# Patient Record
Sex: MALE | Race: WHITE | NOT HISPANIC OR LATINO | Employment: OTHER | URBAN - METROPOLITAN AREA
[De-identification: names, ages, dates, MRNs, and addresses within clinical notes are randomized per-mention and may not be internally consistent; named-entity substitution may affect disease eponyms.]

---

## 2018-04-18 ENCOUNTER — OFFICE VISIT CONVERTED (OUTPATIENT)
Dept: CARDIOLOGY | Facility: CLINIC | Age: 58
End: 2018-04-18
Attending: INTERNAL MEDICINE

## 2019-01-03 ENCOUNTER — HOSPITAL ENCOUNTER (OUTPATIENT)
Dept: URGENT CARE | Facility: CLINIC | Age: 59
Discharge: HOME | End: 2019-01-03
Attending: FAMILY MEDICINE

## 2019-01-11 ENCOUNTER — OFFICE VISIT CONVERTED (OUTPATIENT)
Dept: ORTHOPEDIC SURGERY | Facility: CLINIC | Age: 59
End: 2019-01-11
Attending: ORTHOPAEDIC SURGERY

## 2019-01-31 ENCOUNTER — CONVERSION ENCOUNTER (OUTPATIENT)
Dept: GASTROENTEROLOGY | Facility: CLINIC | Age: 59
End: 2019-01-31

## 2019-01-31 ENCOUNTER — OFFICE VISIT CONVERTED (OUTPATIENT)
Dept: GASTROENTEROLOGY | Facility: CLINIC | Age: 59
End: 2019-01-31
Attending: NURSE PRACTITIONER

## 2019-04-28 ENCOUNTER — HOSPITAL ENCOUNTER (OUTPATIENT)
Dept: URGENT CARE | Facility: CLINIC | Age: 59
Discharge: HOME | End: 2019-04-28

## 2019-04-28 LAB
ALBUMIN SERPL-MCNC: 3.8 G/DL (ref 3.5–5)
ALBUMIN/GLOB SERPL: 1.2 {RATIO} (ref 1.4–2.6)
ALP SERPL-CCNC: 66 U/L (ref 56–119)
ALT SERPL-CCNC: 19 U/L (ref 10–40)
AMYLASE SERPL-CCNC: 54 U/L (ref 30–110)
ANION GAP SERPL CALC-SCNC: 14 MMOL/L (ref 8–19)
AST SERPL-CCNC: 17 U/L (ref 15–50)
BILIRUB SERPL-MCNC: 0.87 MG/DL (ref 0.2–1.3)
BUN SERPL-MCNC: 9 MG/DL (ref 5–25)
BUN/CREAT SERPL: 7 {RATIO} (ref 6–20)
CALCIUM SERPL-MCNC: 9.3 MG/DL (ref 8.7–10.4)
CHLORIDE SERPL-SCNC: 100 MMOL/L (ref 99–111)
CONV CO2: 29 MMOL/L (ref 22–32)
CONV TOTAL PROTEIN: 7.1 G/DL (ref 6.3–8.2)
CREAT UR-MCNC: 1.3 MG/DL (ref 0.7–1.2)
GFR SERPLBLD BASED ON 1.73 SQ M-ARVRAT: 60 ML/MIN/{1.73_M2}
GLOBULIN UR ELPH-MCNC: 3.3 G/DL (ref 2–3.5)
GLUCOSE SERPL-MCNC: 98 MG/DL (ref 70–99)
LIPASE SERPL-CCNC: 22 U/L (ref 5–51)
OSMOLALITY SERPL CALC.SUM OF ELEC: 287 MOSM/KG (ref 273–304)
POTASSIUM SERPL-SCNC: 4.4 MMOL/L (ref 3.5–5.3)
SODIUM SERPL-SCNC: 139 MMOL/L (ref 135–147)

## 2019-04-29 LAB
ALBUMIN SERPL-MCNC: 3.8 G/DL (ref 3.5–5)
ALBUMIN/GLOB SERPL: 1.1 {RATIO} (ref 1.4–2.6)
ALP SERPL-CCNC: 151 U/L (ref 56–119)
ALT SERPL-CCNC: 154 U/L (ref 10–40)
ANION GAP SERPL CALC-SCNC: 14 MMOL/L (ref 8–19)
APPEARANCE UR: CLEAR
AST SERPL-CCNC: 220 U/L (ref 15–50)
BASOPHILS # BLD AUTO: 0.04 10*3/UL (ref 0–0.2)
BASOPHILS NFR BLD AUTO: 0.3 % (ref 0–3)
BILIRUB SERPL-MCNC: 2.86 MG/DL (ref 0.2–1.3)
BILIRUB UR QL: ABNORMAL
BUN SERPL-MCNC: 13 MG/DL (ref 5–25)
BUN/CREAT SERPL: 10 {RATIO} (ref 6–20)
CALCIUM SERPL-MCNC: 9.3 MG/DL (ref 8.7–10.4)
CHLORIDE SERPL-SCNC: 102 MMOL/L (ref 99–111)
COLOR UR: ABNORMAL
CONV ABS IMM GRAN: 0.04 10*3/UL (ref 0–0.2)
CONV BACTERIA: NEGATIVE
CONV CO2: 26 MMOL/L (ref 22–32)
CONV COLLECTION SOURCE (UA): ABNORMAL
CONV IMMATURE GRAN: 0.3 % (ref 0–1.8)
CONV TOTAL PROTEIN: 7.2 G/DL (ref 6.3–8.2)
CONV UROBILINOGEN IN URINE BY AUTOMATED TEST STRIP: >=8 {EHRLICHU}/DL (ref 0.1–1)
CREAT UR-MCNC: 1.25 MG/DL (ref 0.7–1.2)
DEPRECATED RDW RBC AUTO: 41.1 FL (ref 35.1–43.9)
EOSINOPHIL # BLD AUTO: 0.04 10*3/UL (ref 0–0.7)
EOSINOPHIL # BLD AUTO: 0.3 % (ref 0–7)
ERYTHROCYTE [DISTWIDTH] IN BLOOD BY AUTOMATED COUNT: 11.9 % (ref 11.6–14.4)
GFR SERPLBLD BASED ON 1.73 SQ M-ARVRAT: >60 ML/MIN/{1.73_M2}
GLOBULIN UR ELPH-MCNC: 3.4 G/DL (ref 2–3.5)
GLUCOSE SERPL-MCNC: 126 MG/DL (ref 70–99)
GLUCOSE UR QL: NEGATIVE MG/DL
HBA1C MFR BLD: 16.6 G/DL (ref 14–18)
HCT VFR BLD AUTO: 48.9 % (ref 42–52)
HGB UR QL STRIP: NEGATIVE
KETONES UR QL STRIP: ABNORMAL MG/DL
LEUKOCYTE ESTERASE UR QL STRIP: ABNORMAL
LIPASE SERPL-CCNC: 32 U/L (ref 5–51)
LYMPHOCYTES # BLD AUTO: 0.64 10*3/UL (ref 1–5)
MCH RBC QN AUTO: 32 PG (ref 27–31)
MCHC RBC AUTO-ENTMCNC: 33.9 G/DL (ref 33–37)
MCV RBC AUTO: 94.2 FL (ref 80–96)
MONOCYTES # BLD AUTO: 1.05 10*3/UL (ref 0.2–1.2)
MONOCYTES NFR BLD AUTO: 7.6 % (ref 3–10)
NEUTROPHILS # BLD AUTO: 12.04 10*3/UL (ref 2–8)
NEUTROPHILS NFR BLD AUTO: 86.9 % (ref 30–85)
NITRITE UR QL STRIP: NEGATIVE
NRBC CBCN: 0 % (ref 0–0.7)
OSMOLALITY SERPL CALC.SUM OF ELEC: 288 MOSM/KG (ref 273–304)
PH UR STRIP.AUTO: 6 [PH] (ref 5–8)
PLATELET # BLD AUTO: 176 10*3/UL (ref 130–400)
PMV BLD AUTO: 11.7 FL (ref 9.4–12.4)
POTASSIUM SERPL-SCNC: 3.8 MMOL/L (ref 3.5–5.3)
PROT UR QL: 30 MG/DL
RBC # BLD AUTO: 5.19 10*6/UL (ref 4.7–6.1)
RBC #/AREA URNS HPF: ABNORMAL /[HPF]
SODIUM SERPL-SCNC: 138 MMOL/L (ref 135–147)
SP GR UR: 1.02 (ref 1–1.03)
VARIANT LYMPHS NFR BLD MANUAL: 4.6 % (ref 20–45)
WBC # BLD AUTO: 13.85 10*3/UL (ref 4.8–10.8)
WBC #/AREA URNS HPF: ABNORMAL /[HPF]

## 2019-04-30 ENCOUNTER — HOSPITAL ENCOUNTER (OUTPATIENT)
Dept: PERIOP | Facility: HOSPITAL | Age: 59
Setting detail: HOSPITAL OUTPATIENT SURGERY
Discharge: HOME | End: 2019-04-30
Attending: SURGERY

## 2019-04-30 LAB — BACTERIA UR CULT: NORMAL

## 2019-05-02 ENCOUNTER — HOSPITAL ENCOUNTER (OUTPATIENT)
Dept: SURGERY | Facility: CLINIC | Age: 59
Discharge: HOME | End: 2019-05-02
Attending: PHYSICIAN ASSISTANT

## 2019-05-02 ENCOUNTER — OFFICE VISIT CONVERTED (OUTPATIENT)
Dept: SURGERY | Facility: CLINIC | Age: 59
End: 2019-05-02
Attending: PHYSICIAN ASSISTANT

## 2019-05-04 LAB — BACTERIA UR CULT: NORMAL

## 2019-05-14 ENCOUNTER — OFFICE VISIT CONVERTED (OUTPATIENT)
Dept: SURGERY | Facility: CLINIC | Age: 59
End: 2019-05-14
Attending: SURGERY

## 2019-05-14 ENCOUNTER — CONVERSION ENCOUNTER (OUTPATIENT)
Dept: SURGERY | Facility: CLINIC | Age: 59
End: 2019-05-14

## 2019-05-14 ENCOUNTER — HOSPITAL ENCOUNTER (OUTPATIENT)
Dept: LAB | Facility: HOSPITAL | Age: 59
Discharge: HOME | End: 2019-05-14
Attending: SURGERY

## 2019-05-14 LAB
BASOPHILS # BLD AUTO: 0.09 10*3/UL (ref 0–0.2)
BASOPHILS NFR BLD AUTO: 0.5 % (ref 0–3)
CONV ABS IMM GRAN: 0.29 10*3/UL (ref 0–0.2)
CONV IMMATURE GRAN: 1.5 % (ref 0–1.8)
DEPRECATED RDW RBC AUTO: 48.5 FL (ref 35.1–43.9)
EOSINOPHIL # BLD AUTO: 0.13 10*3/UL (ref 0–0.7)
EOSINOPHIL # BLD AUTO: 0.7 % (ref 0–7)
ERYTHROCYTE [DISTWIDTH] IN BLOOD BY AUTOMATED COUNT: 13 % (ref 11.6–14.4)
HBA1C MFR BLD: 14.2 G/DL (ref 14–18)
HCT VFR BLD AUTO: 44.5 % (ref 42–52)
LYMPHOCYTES # BLD AUTO: 1.04 10*3/UL (ref 1–5)
MCH RBC QN AUTO: 32.2 PG (ref 27–31)
MCHC RBC AUTO-ENTMCNC: 31.9 G/DL (ref 33–37)
MCV RBC AUTO: 100.9 FL (ref 80–96)
MONOCYTES # BLD AUTO: 1.77 10*3/UL (ref 0.2–1.2)
MONOCYTES NFR BLD AUTO: 9.2 % (ref 3–10)
NEUTROPHILS # BLD AUTO: 15.87 10*3/UL (ref 2–8)
NEUTROPHILS NFR BLD AUTO: 82.7 % (ref 30–85)
NRBC CBCN: 0 % (ref 0–0.7)
PLATELET # BLD AUTO: 681 10*3/UL (ref 130–400)
PMV BLD AUTO: 10 FL (ref 9.4–12.4)
RBC # BLD AUTO: 4.41 10*6/UL (ref 4.7–6.1)
VARIANT LYMPHS NFR BLD MANUAL: 5.4 % (ref 20–45)
WBC # BLD AUTO: 19.19 10*3/UL (ref 4.8–10.8)

## 2019-05-17 ENCOUNTER — HOSPITAL ENCOUNTER (OUTPATIENT)
Dept: CT IMAGING | Facility: HOSPITAL | Age: 59
Discharge: HOME | End: 2019-05-17
Attending: SURGERY

## 2019-05-19 LAB — CONV ABSCESS CULTURE: NORMAL

## 2019-05-20 ENCOUNTER — CONVERSION ENCOUNTER (OUTPATIENT)
Dept: SURGERY | Facility: CLINIC | Age: 59
End: 2019-05-20

## 2019-05-20 ENCOUNTER — OFFICE VISIT CONVERTED (OUTPATIENT)
Dept: SURGERY | Facility: CLINIC | Age: 59
End: 2019-05-20
Attending: SURGERY

## 2019-05-21 ENCOUNTER — HOSPITAL ENCOUNTER (OUTPATIENT)
Dept: LAB | Facility: HOSPITAL | Age: 59
Discharge: HOME | End: 2019-05-21
Attending: SURGERY

## 2019-05-21 LAB
BASOPHILS # BLD AUTO: 0.08 10*3/UL (ref 0–0.2)
BASOPHILS NFR BLD AUTO: 0.6 % (ref 0–3)
CONV ABS IMM GRAN: 0.16 10*3/UL (ref 0–0.2)
CONV IMMATURE GRAN: 1.3 % (ref 0–1.8)
DEPRECATED RDW RBC AUTO: 44.6 FL (ref 35.1–43.9)
EOSINOPHIL # BLD AUTO: 0.37 10*3/UL (ref 0–0.7)
EOSINOPHIL # BLD AUTO: 3 % (ref 0–7)
ERYTHROCYTE [DISTWIDTH] IN BLOOD BY AUTOMATED COUNT: 12.4 % (ref 11.6–14.4)
HBA1C MFR BLD: 13.5 G/DL (ref 14–18)
HCT VFR BLD AUTO: 41.9 % (ref 42–52)
LYMPHOCYTES # BLD AUTO: 1.45 10*3/UL (ref 1–5)
MCH RBC QN AUTO: 31.4 PG (ref 27–31)
MCHC RBC AUTO-ENTMCNC: 32.2 G/DL (ref 33–37)
MCV RBC AUTO: 97.4 FL (ref 80–96)
MONOCYTES # BLD AUTO: 0.61 10*3/UL (ref 0.2–1.2)
MONOCYTES NFR BLD AUTO: 4.9 % (ref 3–10)
NEUTROPHILS # BLD AUTO: 9.81 10*3/UL (ref 2–8)
NEUTROPHILS NFR BLD AUTO: 78.6 % (ref 30–85)
NRBC CBCN: 0 % (ref 0–0.7)
PLATELET # BLD AUTO: 399 10*3/UL (ref 130–400)
PMV BLD AUTO: 10.2 FL (ref 9.4–12.4)
RBC # BLD AUTO: 4.3 10*6/UL (ref 4.7–6.1)
VARIANT LYMPHS NFR BLD MANUAL: 11.6 % (ref 20–45)
WBC # BLD AUTO: 12.48 10*3/UL (ref 4.8–10.8)

## 2019-05-28 ENCOUNTER — CONVERSION ENCOUNTER (OUTPATIENT)
Dept: SURGERY | Facility: CLINIC | Age: 59
End: 2019-05-28

## 2019-05-28 ENCOUNTER — OFFICE VISIT CONVERTED (OUTPATIENT)
Dept: SURGERY | Facility: CLINIC | Age: 59
End: 2019-05-28
Attending: SURGERY

## 2019-05-31 ENCOUNTER — HOSPITAL ENCOUNTER (OUTPATIENT)
Dept: INFUSION THERAPY | Facility: HOSPITAL | Age: 59
Discharge: HOME | End: 2019-05-31
Attending: INTERNAL MEDICINE

## 2019-05-31 ENCOUNTER — OFFICE VISIT CONVERTED (OUTPATIENT)
Dept: PULMONOLOGY | Facility: CLINIC | Age: 59
End: 2019-05-31
Attending: INTERNAL MEDICINE

## 2019-05-31 LAB
AMYLASE FLD-CCNC: 44 U/L (ref 30–150)
APPEARANCE FLD: ABNORMAL
COLOR AMN: ABNORMAL
CRYSTALS FLD MICRO: ABNORMAL
GLUCOSE FLD-MCNC: 107 MG/DL
LDH FLD-CCNC: 216 U/L
LYMPHOCYTES NFR FLD MANUAL: 84 %
MACROPHAGE FLUID: 12 /100{WBCS}
MONOCYTES NFR FLD: 6 %
NEUTROPHILS NFR FLD MANUAL: 10 %
PH FLD: 7.5 [PH]
PROT FLD-MCNC: 5.2 G/DL (ref 6.3–8.2)
RBC # FLD AUTO: ABNORMAL /UL
SPECIMEN SOURCE: ABNORMAL
WBC # FLD AUTO: 1750 /UL

## 2019-06-03 LAB — BACTERIA FLD CULT: NORMAL

## 2019-06-13 ENCOUNTER — HOSPITAL ENCOUNTER (OUTPATIENT)
Dept: GENERAL RADIOLOGY | Facility: HOSPITAL | Age: 59
Discharge: HOME | End: 2019-06-13
Attending: INTERNAL MEDICINE

## 2019-06-21 ENCOUNTER — HOSPITAL ENCOUNTER (OUTPATIENT)
Dept: GASTROENTEROLOGY | Facility: HOSPITAL | Age: 59
Setting detail: HOSPITAL OUTPATIENT SURGERY
Discharge: HOME | End: 2019-06-21
Attending: INTERNAL MEDICINE

## 2019-08-02 ENCOUNTER — HOSPITAL ENCOUNTER (OUTPATIENT)
Dept: OTHER | Facility: HOSPITAL | Age: 59
Discharge: HOME | End: 2019-08-02
Attending: INTERNAL MEDICINE

## 2019-08-02 ENCOUNTER — HOSPITAL ENCOUNTER (OUTPATIENT)
Dept: OTHER | Facility: HOSPITAL | Age: 59
Discharge: HOME | End: 2019-08-02

## 2019-08-02 LAB
ALBUMIN SERPL-MCNC: 4 G/DL (ref 3.5–5)
ALBUMIN/GLOB SERPL: 1.1 {RATIO} (ref 1.4–2.6)
ALP SERPL-CCNC: 68 U/L (ref 56–119)
ALT SERPL-CCNC: 33 U/L (ref 10–40)
ANION GAP SERPL CALC-SCNC: 13 MMOL/L (ref 8–19)
AST SERPL-CCNC: 22 U/L (ref 15–50)
BASOPHILS # BLD AUTO: 0.06 10*3/UL (ref 0–0.2)
BASOPHILS NFR BLD AUTO: 0.8 % (ref 0–3)
BILIRUB SERPL-MCNC: 0.64 MG/DL (ref 0.2–1.3)
BUN SERPL-MCNC: 18 MG/DL (ref 5–25)
BUN/CREAT SERPL: 15 {RATIO} (ref 6–20)
CALCIUM SERPL-MCNC: 9.4 MG/DL (ref 8.7–10.4)
CHLORIDE SERPL-SCNC: 106 MMOL/L (ref 99–111)
CHOLEST SERPL-MCNC: 125 MG/DL (ref 107–200)
CHOLEST/HDLC SERPL: 3.9 {RATIO} (ref 3–6)
CONV ABS IMM GRAN: 0.04 10*3/UL (ref 0–0.2)
CONV CO2: 26 MMOL/L (ref 22–32)
CONV IMMATURE GRAN: 0.5 % (ref 0–1.8)
CONV TOTAL PROTEIN: 7.6 G/DL (ref 6.3–8.2)
CREAT UR-MCNC: 1.2 MG/DL (ref 0.7–1.2)
DEPRECATED RDW RBC AUTO: 44.6 FL (ref 35.1–43.9)
EOSINOPHIL # BLD AUTO: 0.45 10*3/UL (ref 0–0.7)
EOSINOPHIL # BLD AUTO: 6 % (ref 0–7)
ERYTHROCYTE [DISTWIDTH] IN BLOOD BY AUTOMATED COUNT: 13.2 % (ref 11.6–14.4)
GFR SERPLBLD BASED ON 1.73 SQ M-ARVRAT: >60 ML/MIN/{1.73_M2}
GLOBULIN UR ELPH-MCNC: 3.6 G/DL (ref 2–3.5)
GLUCOSE SERPL-MCNC: 98 MG/DL (ref 70–99)
HBA1C MFR BLD: 16.5 G/DL (ref 14–18)
HCT VFR BLD AUTO: 48.4 % (ref 42–52)
HDLC SERPL-MCNC: 32 MG/DL (ref 40–60)
LDLC SERPL CALC-MCNC: 74 MG/DL (ref 70–100)
LYMPHOCYTES # BLD AUTO: 1.79 10*3/UL (ref 1–5)
MCH RBC QN AUTO: 31.3 PG (ref 27–31)
MCHC RBC AUTO-ENTMCNC: 34.1 G/DL (ref 33–37)
MCV RBC AUTO: 91.7 FL (ref 80–96)
MONOCYTES # BLD AUTO: 0.58 10*3/UL (ref 0.2–1.2)
MONOCYTES NFR BLD AUTO: 7.7 % (ref 3–10)
NEUTROPHILS # BLD AUTO: 4.62 10*3/UL (ref 2–8)
NEUTROPHILS NFR BLD AUTO: 61.3 % (ref 30–85)
NRBC CBCN: 0 % (ref 0–0.7)
OSMOLALITY SERPL CALC.SUM OF ELEC: 294 MOSM/KG (ref 273–304)
PLATELET # BLD AUTO: 210 10*3/UL (ref 130–400)
PMV BLD AUTO: 10.8 FL (ref 9.4–12.4)
POTASSIUM SERPL-SCNC: 4.4 MMOL/L (ref 3.5–5.3)
PSA SERPL-MCNC: 0.85 NG/ML (ref 0–4)
RBC # BLD AUTO: 5.28 10*6/UL (ref 4.7–6.1)
SODIUM SERPL-SCNC: 141 MMOL/L (ref 135–147)
TRIGL SERPL-MCNC: 93 MG/DL (ref 40–150)
TSH SERPL-ACNC: 2.07 M[IU]/L (ref 0.27–4.2)
VARIANT LYMPHS NFR BLD MANUAL: 23.7 % (ref 20–45)
VLDLC SERPL-MCNC: 19 MG/DL (ref 5–37)
WBC # BLD AUTO: 7.54 10*3/UL (ref 4.8–10.8)

## 2019-08-06 LAB — CONV NASH FIBROSURE: NORMAL

## 2019-08-14 ENCOUNTER — OFFICE VISIT CONVERTED (OUTPATIENT)
Dept: CARDIOLOGY | Facility: CLINIC | Age: 59
End: 2019-08-14
Attending: INTERNAL MEDICINE

## 2019-09-23 ENCOUNTER — CONVERSION ENCOUNTER (OUTPATIENT)
Dept: CARDIOLOGY | Facility: CLINIC | Age: 59
End: 2019-09-23
Attending: INTERNAL MEDICINE

## 2020-02-07 ENCOUNTER — HOSPITAL ENCOUNTER (OUTPATIENT)
Dept: LAB | Facility: HOSPITAL | Age: 60
Discharge: HOME | End: 2020-02-07
Attending: INTERNAL MEDICINE

## 2020-02-07 LAB
ALBUMIN SERPL-MCNC: 3.8 G/DL (ref 3.5–5)
ALBUMIN/GLOB SERPL: 1.2 {RATIO} (ref 1.4–2.6)
ALP SERPL-CCNC: 59 U/L (ref 56–119)
ALT SERPL-CCNC: 28 U/L (ref 10–40)
ANION GAP SERPL CALC-SCNC: 19 MMOL/L (ref 8–19)
AST SERPL-CCNC: 22 U/L (ref 15–50)
BILIRUB SERPL-MCNC: 0.46 MG/DL (ref 0.2–1.3)
BUN SERPL-MCNC: 13 MG/DL (ref 5–25)
BUN/CREAT SERPL: 10 {RATIO} (ref 6–20)
CALCIUM SERPL-MCNC: 9.5 MG/DL (ref 8.7–10.4)
CHLORIDE SERPL-SCNC: 105 MMOL/L (ref 99–111)
CHOLEST SERPL-MCNC: 132 MG/DL (ref 107–200)
CHOLEST/HDLC SERPL: 4.6 {RATIO} (ref 3–6)
CONV CO2: 24 MMOL/L (ref 22–32)
CONV TOTAL PROTEIN: 7.1 G/DL (ref 6.3–8.2)
CREAT UR-MCNC: 1.28 MG/DL (ref 0.7–1.2)
GFR SERPLBLD BASED ON 1.73 SQ M-ARVRAT: >60 ML/MIN/{1.73_M2}
GLOBULIN UR ELPH-MCNC: 3.3 G/DL (ref 2–3.5)
GLUCOSE SERPL-MCNC: 97 MG/DL (ref 70–99)
HDLC SERPL-MCNC: 29 MG/DL (ref 40–60)
LDLC SERPL CALC-MCNC: 86 MG/DL (ref 70–100)
OSMOLALITY SERPL CALC.SUM OF ELEC: 296 MOSM/KG (ref 273–304)
POTASSIUM SERPL-SCNC: 4.5 MMOL/L (ref 3.5–5.3)
SODIUM SERPL-SCNC: 143 MMOL/L (ref 135–147)
TRIGL SERPL-MCNC: 84 MG/DL (ref 40–150)
VLDLC SERPL-MCNC: 17 MG/DL (ref 5–37)

## 2020-02-19 ENCOUNTER — OFFICE VISIT CONVERTED (OUTPATIENT)
Dept: CARDIOLOGY | Facility: CLINIC | Age: 60
End: 2020-02-19
Attending: INTERNAL MEDICINE

## 2020-02-19 ENCOUNTER — CONVERSION ENCOUNTER (OUTPATIENT)
Dept: CARDIOLOGY | Facility: CLINIC | Age: 60
End: 2020-02-19

## 2020-04-17 ENCOUNTER — TELEMEDICINE CONVERTED (OUTPATIENT)
Dept: UROLOGY | Facility: CLINIC | Age: 60
End: 2020-04-17
Attending: UROLOGY

## 2020-04-20 ENCOUNTER — HOSPITAL ENCOUNTER (OUTPATIENT)
Dept: LAB | Facility: HOSPITAL | Age: 60
Discharge: HOME | End: 2020-04-20
Attending: UROLOGY

## 2020-04-20 LAB
APPEARANCE UR: CLEAR
BILIRUB UR QL: NEGATIVE
COLOR UR: YELLOW
CONV COLLECTION SOURCE (UA): NORMAL
CONV UROBILINOGEN IN URINE BY AUTOMATED TEST STRIP: 0.2 {EHRLICHU}/DL (ref 0.1–1)
GLUCOSE UR QL: NEGATIVE MG/DL
HGB UR QL STRIP: NEGATIVE
KETONES UR QL STRIP: NEGATIVE MG/DL
LEUKOCYTE ESTERASE UR QL STRIP: NEGATIVE
NITRITE UR QL STRIP: NEGATIVE
PH UR STRIP.AUTO: 5 [PH] (ref 5–8)
PROT UR QL: NEGATIVE MG/DL
PSA SERPL-MCNC: 1.34 NG/ML (ref 0–4)
SP GR UR: 1.02 (ref 1–1.03)

## 2020-04-22 LAB — BACTERIA UR CULT: NORMAL

## 2020-05-21 ENCOUNTER — OFFICE VISIT CONVERTED (OUTPATIENT)
Dept: UROLOGY | Facility: CLINIC | Age: 60
End: 2020-05-21
Attending: UROLOGY

## 2020-08-20 ENCOUNTER — HOSPITAL ENCOUNTER (OUTPATIENT)
Dept: LAB | Facility: HOSPITAL | Age: 60
Discharge: HOME | End: 2020-08-20
Attending: NURSE PRACTITIONER

## 2020-08-20 LAB
ALBUMIN SERPL-MCNC: 4 G/DL (ref 3.5–5)
ALBUMIN/GLOB SERPL: 1.4 {RATIO} (ref 1.4–2.6)
ALP SERPL-CCNC: 63 U/L (ref 56–119)
ALT SERPL-CCNC: 31 U/L (ref 10–40)
ANION GAP SERPL CALC-SCNC: 19 MMOL/L (ref 8–19)
AST SERPL-CCNC: 27 U/L (ref 15–50)
BILIRUB SERPL-MCNC: 0.65 MG/DL (ref 0.2–1.3)
BUN SERPL-MCNC: 16 MG/DL (ref 5–25)
BUN/CREAT SERPL: 11 {RATIO} (ref 6–20)
CALCIUM SERPL-MCNC: 9.7 MG/DL (ref 8.7–10.4)
CHLORIDE SERPL-SCNC: 105 MMOL/L (ref 99–111)
CHOLEST SERPL-MCNC: 146 MG/DL (ref 107–200)
CHOLEST/HDLC SERPL: 4.6 {RATIO} (ref 3–6)
CONV CO2: 22 MMOL/L (ref 22–32)
CONV TOTAL PROTEIN: 6.9 G/DL (ref 6.3–8.2)
CREAT UR-MCNC: 1.46 MG/DL (ref 0.7–1.2)
GFR SERPLBLD BASED ON 1.73 SQ M-ARVRAT: 52 ML/MIN/{1.73_M2}
GLOBULIN UR ELPH-MCNC: 2.9 G/DL (ref 2–3.5)
GLUCOSE SERPL-MCNC: 97 MG/DL (ref 70–99)
HDLC SERPL-MCNC: 32 MG/DL (ref 40–60)
LDLC SERPL CALC-MCNC: 95 MG/DL (ref 70–100)
OSMOLALITY SERPL CALC.SUM OF ELEC: 293 MOSM/KG (ref 273–304)
POTASSIUM SERPL-SCNC: 4.6 MMOL/L (ref 3.5–5.3)
SODIUM SERPL-SCNC: 141 MMOL/L (ref 135–147)
TRIGL SERPL-MCNC: 96 MG/DL (ref 40–150)
VLDLC SERPL-MCNC: 19 MG/DL (ref 5–37)

## 2020-09-02 ENCOUNTER — OFFICE VISIT CONVERTED (OUTPATIENT)
Dept: CARDIOLOGY | Facility: CLINIC | Age: 60
End: 2020-09-02
Attending: INTERNAL MEDICINE

## 2020-09-02 ENCOUNTER — CONVERSION ENCOUNTER (OUTPATIENT)
Dept: CARDIOLOGY | Facility: CLINIC | Age: 60
End: 2020-09-02

## 2020-09-18 ENCOUNTER — HOSPITAL ENCOUNTER (OUTPATIENT)
Dept: INFUSION THERAPY | Facility: HOSPITAL | Age: 60
Discharge: HOME | End: 2020-09-18
Attending: INTERNAL MEDICINE

## 2020-09-18 LAB
HCT VFR BLD AUTO: 47.5 % (ref 42–52)
HGB BLD-MCNC: 16.3 G/DL (ref 14–18)

## 2020-11-20 ENCOUNTER — HOSPITAL ENCOUNTER (OUTPATIENT)
Dept: INFUSION THERAPY | Facility: HOSPITAL | Age: 60
Discharge: HOME | End: 2020-11-20
Attending: INTERNAL MEDICINE

## 2020-11-20 LAB
HCT VFR BLD AUTO: 46.4 % (ref 42–52)
HGB BLD-MCNC: 16.3 G/DL (ref 14–18)

## 2021-01-04 ENCOUNTER — HOSPITAL ENCOUNTER (OUTPATIENT)
Dept: OTHER | Facility: HOSPITAL | Age: 61
Discharge: HOME | End: 2021-01-04
Attending: INTERNAL MEDICINE

## 2021-01-15 ENCOUNTER — HOSPITAL ENCOUNTER (OUTPATIENT)
Dept: INFUSION THERAPY | Facility: HOSPITAL | Age: 61
Discharge: HOME | End: 2021-01-15
Attending: INTERNAL MEDICINE

## 2021-01-15 LAB
HCT VFR BLD AUTO: 49.8 % (ref 42–52)
HGB BLD-MCNC: 16.9 G/DL (ref 14–18)

## 2021-01-28 ENCOUNTER — HOSPITAL ENCOUNTER (OUTPATIENT)
Dept: OTHER | Facility: HOSPITAL | Age: 61
Discharge: HOME | End: 2021-01-28
Attending: INTERNAL MEDICINE

## 2021-02-10 ENCOUNTER — HOSPITAL ENCOUNTER (OUTPATIENT)
Dept: GENERAL RADIOLOGY | Facility: HOSPITAL | Age: 61
Discharge: HOME | End: 2021-02-10

## 2021-03-12 ENCOUNTER — HOSPITAL ENCOUNTER (OUTPATIENT)
Dept: LAB | Facility: HOSPITAL | Age: 61
Discharge: HOME | End: 2021-03-12
Attending: INTERNAL MEDICINE

## 2021-03-12 LAB
ALBUMIN SERPL-MCNC: 4.2 G/DL (ref 3.5–5)
ALBUMIN/GLOB SERPL: 1.2 {RATIO} (ref 1.4–2.6)
ALP SERPL-CCNC: 69 U/L (ref 56–119)
ALT SERPL-CCNC: 29 U/L (ref 10–40)
ANION GAP SERPL CALC-SCNC: 16 MMOL/L (ref 8–19)
AST SERPL-CCNC: 22 U/L (ref 15–50)
BASOPHILS # BLD AUTO: 0.05 10*3/UL (ref 0–0.2)
BASOPHILS NFR BLD AUTO: 0.8 % (ref 0–3)
BILIRUB SERPL-MCNC: 0.53 MG/DL (ref 0.2–1.3)
BUN SERPL-MCNC: 19 MG/DL (ref 5–25)
BUN/CREAT SERPL: 16 {RATIO} (ref 6–20)
CALCIUM SERPL-MCNC: 9.2 MG/DL (ref 8.7–10.4)
CHLORIDE SERPL-SCNC: 103 MMOL/L (ref 99–111)
CHOLEST SERPL-MCNC: 122 MG/DL (ref 107–200)
CHOLEST/HDLC SERPL: 3.4 {RATIO} (ref 3–6)
CONV ABS IMM GRAN: 0.02 10*3/UL (ref 0–0.2)
CONV CO2: 23 MMOL/L (ref 22–32)
CONV IMMATURE GRAN: 0.3 % (ref 0–1.8)
CONV TOTAL PROTEIN: 7.6 G/DL (ref 6.3–8.2)
CREAT UR-MCNC: 1.19 MG/DL (ref 0.7–1.2)
DEPRECATED RDW RBC AUTO: 42.9 FL (ref 35.1–43.9)
EOSINOPHIL # BLD AUTO: 0.27 10*3/UL (ref 0–0.7)
EOSINOPHIL # BLD AUTO: 4.1 % (ref 0–7)
ERYTHROCYTE [DISTWIDTH] IN BLOOD BY AUTOMATED COUNT: 12.6 % (ref 11.6–14.4)
GFR SERPLBLD BASED ON 1.73 SQ M-ARVRAT: >60 ML/MIN/{1.73_M2}
GLOBULIN UR ELPH-MCNC: 3.4 G/DL (ref 2–3.5)
GLUCOSE SERPL-MCNC: 92 MG/DL (ref 70–99)
HCT VFR BLD AUTO: 52.1 % (ref 42–52)
HDLC SERPL-MCNC: 36 MG/DL (ref 40–60)
HGB BLD-MCNC: 17.3 G/DL (ref 14–18)
LDLC SERPL CALC-MCNC: 72 MG/DL (ref 70–100)
LYMPHOCYTES # BLD AUTO: 1.36 10*3/UL (ref 1–5)
LYMPHOCYTES NFR BLD AUTO: 20.6 % (ref 20–45)
MCH RBC QN AUTO: 30.8 PG (ref 27–31)
MCHC RBC AUTO-ENTMCNC: 33.2 G/DL (ref 33–37)
MCV RBC AUTO: 92.9 FL (ref 80–96)
MONOCYTES # BLD AUTO: 0.55 10*3/UL (ref 0.2–1.2)
MONOCYTES NFR BLD AUTO: 8.3 % (ref 3–10)
NEUTROPHILS # BLD AUTO: 4.36 10*3/UL (ref 2–8)
NEUTROPHILS NFR BLD AUTO: 65.9 % (ref 30–85)
NRBC CBCN: 0 % (ref 0–0.7)
OSMOLALITY SERPL CALC.SUM OF ELEC: 288 MOSM/KG (ref 273–304)
PLATELET # BLD AUTO: 196 10*3/UL (ref 130–400)
PMV BLD AUTO: 11.1 FL (ref 9.4–12.4)
POTASSIUM SERPL-SCNC: 4.3 MMOL/L (ref 3.5–5.3)
RBC # BLD AUTO: 5.61 10*6/UL (ref 4.7–6.1)
SODIUM SERPL-SCNC: 138 MMOL/L (ref 135–147)
TRIGL SERPL-MCNC: 70 MG/DL (ref 40–150)
VLDLC SERPL-MCNC: 14 MG/DL (ref 5–37)
WBC # BLD AUTO: 6.61 10*3/UL (ref 4.8–10.8)

## 2021-04-07 ENCOUNTER — OFFICE VISIT CONVERTED (OUTPATIENT)
Dept: CARDIOLOGY | Facility: CLINIC | Age: 61
End: 2021-04-07
Attending: NURSE PRACTITIONER

## 2021-04-09 ENCOUNTER — HOSPITAL ENCOUNTER (OUTPATIENT)
Dept: CARDIOLOGY | Facility: HOSPITAL | Age: 61
Discharge: HOME | End: 2021-04-09
Attending: INTERNAL MEDICINE

## 2021-05-06 ENCOUNTER — HOSPITAL ENCOUNTER (OUTPATIENT)
Dept: INFUSION THERAPY | Facility: HOSPITAL | Age: 61
Discharge: HOME | End: 2021-05-06
Attending: INTERNAL MEDICINE

## 2021-05-06 LAB
HCT VFR BLD AUTO: 51.1 % (ref 42–52)
HGB BLD-MCNC: 17.7 G/DL (ref 14–18)

## 2021-05-12 NOTE — PROGRESS NOTES
Progress Note      Patient Name: Paolo Goldstein   Patient ID: [id number]   Sex: Male   YOB: 1960    Primary Care Provider: Stanford Howe MD   Referring Provider: Stanford Howe MD    Visit Date: April 17, 2020    Provider: Elsy Broderick MD   Location: [facility]   Location Address: [address] [place] [address]  [id number]   Location Phone: [phone]          History Of Present Illness  Video Conferencing Visit  Paolo Goldstein is a 59 year old /White male who is presenting for evaluation via video conferencing. Verbal consent obtained before beginning visit.   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  It is present most times he checks for it.      He denies hematuria, frequency, urgency, testicular or bladder pain, prostate pain, and pain with ejaculation.      He has no recent infections that he is aware of.      PSA has not been done recently.     Urine culture has not been done.     Erections are not a problem.       Past Medical History  Afib; Arthritis; Colon Polyps; Essential hypertension; Family history of colon cancer; Fatty liver; GERD (gastroesophageal reflux disease); Heart Disease; High blood pressure; Hip pain; Hyperlipemia; MGUS (monoclonal gammopathy of unknown significance); Pes cavus of left foot; Primary osteoarthritis of foot, left; Reflux; RUQ pain; Sciatic nerve pain         Past Surgical History  Ankle surgery; Artificial Joints/Limbs; Cardioversion; Cholecystectomies, laparoscopic; Cholecystectomy; Colonoscopy; EGD; Hip relacement, left; Hip Replacement; Joint Surgery         Medication List  Aspir-81 81 mg oral tablet,delayed release (DR/EC); Atacand 16 mg oral tablet; Crestor 20 mg oral tablet; hydrochlorothiazide 12.5 mg oral capsule; Toprol XL 50 mg oral tablet extended release 24 hr; turmeric 400  mg oral capsule         Allergy List  NO KNOWN DRUG ALLERGIES         Family Medical History  Heart Disease; Prostate cancer; Family history of colon cancer; *No Known Family History; Family history of certain chronic disabling diseases; arthritis; Family history of heart disease         Social History  Alcohol Use (Current some day); lives with spouse; .; Recreational Drug Use (Never); Tobacco (Never); Working         Review of Systems  · Constitutional  o Denies  o : fatigue, fever  · Cardiovascular  o Denies  o : chest pain, heart disease, heart attack  · Respiratory  o Denies  o : lung disease, shortness of breath, asthma  · Gastrointestinal  o Denies  o : stomach or bowel disease, blood in stools, ulcers  · Genitourinary  o Denies  o : frequent urination , kidney or bladder infections, urgency or urination, difficulty voiding, interrupted stream, urgency incontinence, urinary leakage, voiding at night, painful intercourse, painful urination, straining to urinate, sexual dysfunction, blood in urine, slow stream, kidney stone  · Neurologic  o Denies  o : neurologic disease  · Musculoskeletal  o Denies  o : swelling or pain in your lower extremities      Physical Examination  · Constitutional  o Appearance  o : well-nourished, well developed, alert, in no acute distress  · Head and Face  o Head  o :   § Inspection  § : atraumatic, normocephalic  o Face  o :   § Inspection  § : no facial lesions  · Ears, Nose, Mouth and Throat  o Ears  o :   § External Ears  § : appearance within normal limits, no lesions present  o Nose  o :   § External Nose  § : appearance normal  · Neck  o Inspection/Palpation  o : normal appearance, trachea midline  · Respiratory  o Respiratory Effort  o : breathing unlabored  · Neurologic  o Mental Status Examination  o :   § Orientation  § : grossly oriented to person, place and time  § Speech/Language  § : communication ability within normal limits  o Gait and Station  o : normal  gait, able to stand without difficulty  · Psychiatric  o Judgement and Insight  o : judgment and insight intact, judgement for everyday activities and social situations within normal limits, insight intact  o Mood and Affect  o : mood normal, affect appropriate              Assessment  · Hematospermia     608.82/R36.1  · Cystitis     595.9/N30.90    Problems Reconciled  Plan  · Orders  o PSA ultrasensitive DIAGNOSTIC [facility] (00203) - 608.82/R36.1 - 04/17/2020  o Urinalysis dipstick auto w micro (98512) - 608.82/R36.1, 595.9/N30.90 - 04/17/2020  o Urine Culture (Clean Catch) [facility] ([id number]) - 608.82/R36.1, 595.9/N30.90 - 04/17/2020  · Medications  o Medications have been Reconciled  o Transition of Care or Provider Policy  · Instructions  o He will have a urinalysis and urine culture as well as a PSA. I will call him with those results.             Electronically Signed by: Elsy Broderick MD -Author on May 5, 2020 08:39:35 AM

## 2021-05-13 NOTE — PROGRESS NOTES
Progress Note      Patient Name: Paolo Goldstein   Patient ID: [id number]   Sex: Male   YOB: 1960    Primary Care Provider: Stanford Howe MD   Referring Provider: Stanford Howe MD    Visit Date: May 21, 2020    Provider: Elsy Broderick MD   Location: [facility]   Location Address: [address] [place] [address]  [id number]   Location Phone: [phone]          Chief Complaint  · blood in semen      History Of Present Illness  Paolo Goldstein is a 59 year old /White male who is presenting for evaluation for hematospermia.      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  It is present most times he checks for it.  It has gotten more frequent and more bloody the past few days.     He denies hematuria, frequency, urgency, testicular or bladder pain, prostate pain, and pain with ejaculation.      He has no recent infections that he is aware of.      PSA recently was  1.34.      Urine culture and urinalysis were negative.     Erections are not a problem.       Past Medical History  Afib; Arthritis; Colon Polyps; Essential hypertension; Family history of colon cancer; Fatty liver; GERD (gastroesophageal reflux disease); Heart Disease; High blood pressure; Hip pain; Hyperlipemia; MGUS (monoclonal gammopathy of unknown significance); Pes cavus of left foot; Primary osteoarthritis of foot, left; Reflux; RUQ pain; Sciatic nerve pain         Past Surgical History  Ankle surgery; Artificial Joints/Limbs; Cardioversion; Cholecystectomies, laparoscopic; Cholecystectomy; Colonoscopy; EGD; Hip relacement, left; Hip Replacement; Joint Surgery         Medication List  Aspir-81 81 mg oral tablet,delayed release (/EC); Atacand 16 mg oral tablet; Crestor 20 mg oral tablet; hydrochlorothiazide 12.5 mg oral capsule; Toprol XL 50 mg oral tablet extended release 24 hr; turmeric 400 mg oral capsule         Allergy List  NO KNOWN DRUG ALLERGIES  "      Allergies Reconciled  Family Medical History  Heart Disease; Prostate cancer; Family history of colon cancer; *No Known Family History; Family history of certain chronic disabling diseases; arthritis; Family history of heart disease         Social History  Alcohol Use (Current some day); lives with spouse; .; Recreational Drug Use (Never); Tobacco (Never); Working         Review of Systems  · Constitutional  o Denies  o : chills, fever  · Gastrointestinal  o Denies  o : nausea, vomiting      Vitals  Date Time BP Position Site L\R Cuff Size HR RR TEMP (F) WT  HT  BMI kg/m2 BSA m2 O2 Sat HC       05/21/2020 12:44 /55 Sitting       238lbs 0oz 5'  7\" 37.28 2.26           Physical Examination  · Constitutional  o Appearance  o : well-nourished, well developed, alert, in no acute distress  · Head and Face  o Head  o :   § Inspection  § : atraumatic, normocephalic  o Face  o :   § Inspection  § : no facial lesions  · Ears, Nose, Mouth and Throat  o Ears  o :   § External Ears  § : appearance within normal limits, no lesions present  o Nose  o :   § External Nose  § : appearance normal  · Neck  o Inspection/Palpation  o : normal appearance, trachea midline  · Respiratory  o Respiratory Effort  o : breathing unlabored              Assessment  · Hematospermia     608.82/R36.1  · Cystitis     595.9/N30.90      Plan  · Orders  o CT Abdomen and Pelvis without and with Contrast UROLOGY PROTOCOL (includes delayed imaging) [facility] ([id number]) - 608.82/R36.1, 595.9/N30.90 - 05/21/2020  · Medications  o Medications have been Reconciled  o Transition of Care or Provider Policy  · Instructions  o I will get a CT scan and call him with those results.   o Electronically Identified Patient Education Materials Provided Electronically            Electronically Signed by: Elsy Broderick MD -Author on May 21, 2020 01:38:27 PM  "

## 2021-05-13 NOTE — PROGRESS NOTES
"   Progress Note      Patient Name: Paolo Goldstein   Patient ID: [id number]   Sex: Male   YOB: 1960    Primary Care Provider: Stanford Howe MD   Referring Provider: Stanford Howe MD    Visit Date: September 2, 2020    Provider: Jameson La MD   Location: [facility]   Location Address: [address] [place] [address], [facility]   [place], [place]  [id number]   Location Phone: [phone]          Chief Complaint  · Hypertension   · Hyperlipidemia       History Of Present Illness  REFERRING PROVIDER: Stanford Howe MD   Paolo Goldstein is a 60-year-old gentleman with hypertension, hyperlipidemia, obesity, who comes for a follow-up today. He is doing well in terms of his cardiovascular status. He denies chest pain, shortness of breath, PND, orthopnea, palpitations, dizziness or syncope. He has gained another 6 pounds.   PAST MEDICAL HISTORY: Chronic kidney disease, Stage 3; hyperlipidemia; hypertension; obesity.   FAMILY HISTORY: Negative for diabetes, hypertension and heart disease.   PSYCHOSOCIAL HISTORY: No history of mood changes or depression. He rarely drinks alcohol and never used tobacco.   CURRENT MEDICATIONS: include Atacand 16 mg daily; Toprol 50 mg alternating with 75 mg every other day; ASA 81 mg daily; turmeric 1000 mg daily; HCTZ 12.5 mg every other day; Crestor 20 mg every other day; Lansoprazole 15 mg p.r.n. The dosage and frequency of the medications were reviewed with the patient.       Review of Systems  · Cardiovascular  o Denies  o : palpitations (fast, fluttering, or skipping beats), swelling (feet, ankles, hands), shortness of breath while walking or lying flat, chest pain or angina pectoris   · Respiratory  o Denies  o : chronic or frequent cough, asthma or wheezing      Vitals  Date Time BP Position Site L\R Cuff Size HR RR TEMP (F) WT  HT  BMI kg/m2 BSA m2 O2 Sat HC       09/02/2020 02:19 /72 Sitting    68 - R   239lbs 0oz 5'  7\" 37.43 2.26   "         Physical Examination  · Constitutional  o Appearance  o : Awake, alert, in no acute distress.  · Eyes  o Conjunctivae  o : Conjunctivae normal.  · Ears, Nose, Mouth and Throat  o Oral Cavity  o :   § Oral Mucosa  § : Normal.  · Neck  o Inspection/Palpation/Auscultation  o : No lymphadenopathy. No JVD. No bruit. Good carotid upstroke.  · Respiratory  o Respiratory  o : Clear to percussion and auscultation. Good respiratory effort.  · Cardiovascular  o Heart  o : PMI is normal. S1, S2 normal. No S3. S4+. Negative systolic/diastolic murmur.   o Peripheral Vascular System  o :   § Extremities  § : Good femoral and pedal pulses. No pedal edema.  · Gastrointestinal  o Abdominal Examination  o : Soft. No masses or tenderness felt. No hepatosplenomegaly. Abdominal aorta is not palpable.     Chemistry panel:  BUN 16, creatinine 1.46, GFR 52.  Normal electrolytes.  HDL 32, LDL 95, TRG 96.  The LDL has gone up further.           Assessment     1.  Hypertension controlled.  2.  Paroxysmal atrial fibrillation, postoperative, resolved.  No recurrence.  Negative 30-day monitor for atrial       fibrillation in 2019.  3.  Hyperlipidemia, LDL has gone up further, needs tighter control.  4.  Morbid obesity.       Plan     1.  Increase Crestor to 20 mg 6 days a week.  2.  Obtain risk/CMP in 3 months and office visit in 6 months.  3.  Strongly urged him to lose weight.  He states that he has been exercising regularly.    Jameson La M.D., [facility]  pmm/dmd           This note was transcribed by Jaqueline Jacome.  dmd/pmm  The above service was transcribed by Jaqueline Jacome, and I attest to the accuracy of the note.  PMM               Electronically Signed by: Jaqueline Jacome-, -Author on September 8, 2020 05:39:01 AM  Electronically Co-signed by: Jameson La MD -Reviewer on September 13, 2020 06:34:03 PM

## 2021-05-14 VITALS
WEIGHT: 226 LBS | BODY MASS INDEX: 35.47 KG/M2 | HEART RATE: 62 BPM | HEIGHT: 67 IN | SYSTOLIC BLOOD PRESSURE: 113 MMHG | DIASTOLIC BLOOD PRESSURE: 70 MMHG

## 2021-05-14 VITALS
WEIGHT: 239 LBS | HEART RATE: 68 BPM | HEIGHT: 67 IN | DIASTOLIC BLOOD PRESSURE: 72 MMHG | BODY MASS INDEX: 37.51 KG/M2 | SYSTOLIC BLOOD PRESSURE: 118 MMHG

## 2021-05-14 NOTE — PROGRESS NOTES
Progress Note      Patient Name: Paolo Goldstein   Patient ID: [id number]   Sex: Male   YOB: 1960    Primary Care Provider: Stanford Howe MD   Referring Provider: Stanford Howe MD    Visit Date: April 7, 2021    Provider: LON Gramajo   Location: [facility]   Location Address: [address] [place] [address], [facility] [place]   [place], [place]  [id number]   Location Phone: [phone]          Chief Complaint     Evaluate hyperlipidemia and hypertension.       History Of Present Illness  REFERRING PROVIDER: Stanford Howe MD   Paolo Goldstein is a 60 year old /White male with ho has been on the keto diet so has lost 13 pounds since his last visit. He comes in and denies any chest pain or pressure, no palpitations, shortness of breath, swelling, dizziness, syncope, PND, or orthopnea. Cardiac wise he is feeling very well. Apparently, he has been taking his Crestor 20 mg every day rather than 25 a day as was previously directed but he also admits that because of his diet his labs are better. He has monoclonal gammopathy (MGUS)of unknown significance. He is followed with body scans. They noted some plaque build-up in the carotids so he is having a carotid Doppler study ordered by his primary care provider and will have it forwarded to us.   PAST MEDICAL HISTORY: Chronic kidney disease, Stage 3; hyperlipidemia; hypertension; obesity.   PSYCHOSOCIAL HISTORY: Moderate use of alcohol. Denies tobacco use.   CURRENT MEDICATIONS: Aspirin 81 mg daily; Crestor 10 mg every day; Toprol 75 mg one day alternating with 50 mg the next; HCTZ 12.5 mg every other day; Atacand 16 mg daily.      ALLERGIES: No known drug allergies.       Review of Systems  · Cardiovascular  o Denies  o : palpitations (fast, fluttering, or skipping beats), swelling (feet, ankles, hands), shortness of breath while walking or lying flat, chest pain or angina pectoris   · Respiratory  o Denies  o : chronic or  "frequent cough      Vitals  Date Time BP Position Site L\R Cuff Size HR RR TEMP (F) WT  HT  BMI kg/m2 BSA m2 O2 Sat FR L/min FiO2 HC       04/07/2021 02:17 /70 Sitting    62 - R   226lbs 0oz 5'  7\" 35.4 2.2             Physical Examination  · Constitutional  o Appearance  o : Awake, alert, in no acute distress.  · Eyes  o Conjunctivae  o : Conjunctivae normal.  · Ears, Nose, Mouth and Throat  o Oral Cavity  o :   § Oral Mucosa  § : Normal.  · Neck  o Jugular Veins  o : No JVD or lymphadenopathy. Good carotid upstroke. No bruits noted.  · Respiratory  o Respiratory  o : Good respiratory effort. Clear to percussion and auscultation.  · Cardiovascular  o Heart  o : PMI is normal. S1, S2 normal. No S3. No S4. Negative systolic/diastolic murmur.  o Peripheral Vascular System  o :   § Extremities  § : Good femoral and pedal pulses. No pedal edema.  · Gastrointestinal  o Abdominal Examination  o : Soft. No tenderness or masses felt. No hepatosplenomegaly. Abdominal aorta is not palpable.  · Labs  o Labs  o : Taken recently glucose 92, total cholesterol is 122, triglycerides 70, HDL 36, LDL 72 and much better for him.           Assessment     1.  Hyperlipidemia. Improving with weight loss.  2.  Hypertension, controlled.  3.  Paroxysmal atrial fibrillation, currently in sinus, no recent episodes.   4.  Obesity. Improving with weight loss.          Plan     1.  Continue his Crestor in view of his hyperlipidemia.   2.  Continue his Toprol hydrochlorothiazide and Atacand in view of his hypertension.  3.  Continue the aspirin for risk reduction.     FOLLOW-UP: 8-9 months with labs or earlier if needed.        LON Valdes  JF:vh                Electronically Signed by: Melody Juarez-, OT -Author on April 17, 2021 05:14:29 AM  Electronically Co-signed by: LON Gramajo -Reviewer on April 19, 2021 07:22:30 AM  "

## 2021-05-15 VITALS
SYSTOLIC BLOOD PRESSURE: 114 MMHG | HEIGHT: 67 IN | DIASTOLIC BLOOD PRESSURE: 80 MMHG | HEART RATE: 70 BPM | BODY MASS INDEX: 36.57 KG/M2 | WEIGHT: 233 LBS

## 2021-05-15 VITALS — BODY MASS INDEX: 37.67 KG/M2 | HEIGHT: 67 IN | WEIGHT: 240 LBS | RESPIRATION RATE: 14 BRPM

## 2021-05-15 VITALS — BODY MASS INDEX: 37.76 KG/M2 | HEIGHT: 67 IN | RESPIRATION RATE: 16 BRPM | WEIGHT: 240.56 LBS

## 2021-05-15 VITALS — RESPIRATION RATE: 14 BRPM | WEIGHT: 238.12 LBS | TEMPERATURE: 98.1 F | HEIGHT: 67 IN | BODY MASS INDEX: 37.37 KG/M2

## 2021-05-15 VITALS
BODY MASS INDEX: 37.35 KG/M2 | DIASTOLIC BLOOD PRESSURE: 55 MMHG | WEIGHT: 238 LBS | SYSTOLIC BLOOD PRESSURE: 116 MMHG | HEIGHT: 67 IN

## 2021-05-15 VITALS
DIASTOLIC BLOOD PRESSURE: 76 MMHG | SYSTOLIC BLOOD PRESSURE: 122 MMHG | HEART RATE: 66 BPM | BODY MASS INDEX: 35.31 KG/M2 | HEIGHT: 67 IN | WEIGHT: 225 LBS

## 2021-05-15 VITALS — WEIGHT: 240 LBS | BODY MASS INDEX: 37.67 KG/M2 | RESPIRATION RATE: 18 BRPM | HEIGHT: 67 IN

## 2021-05-16 VITALS
DIASTOLIC BLOOD PRESSURE: 68 MMHG | BODY MASS INDEX: 34.25 KG/M2 | WEIGHT: 226 LBS | HEART RATE: 68 BPM | SYSTOLIC BLOOD PRESSURE: 108 MMHG | HEIGHT: 68 IN

## 2021-05-16 VITALS — HEIGHT: 68 IN | BODY MASS INDEX: 34.25 KG/M2 | WEIGHT: 226 LBS

## 2021-05-16 VITALS
HEIGHT: 68 IN | BODY MASS INDEX: 36.07 KG/M2 | SYSTOLIC BLOOD PRESSURE: 119 MMHG | DIASTOLIC BLOOD PRESSURE: 73 MMHG | HEART RATE: 83 BPM | WEIGHT: 238 LBS

## 2021-05-28 VITALS
HEIGHT: 67 IN | HEART RATE: 74 BPM | RESPIRATION RATE: 14 BRPM | BODY MASS INDEX: 35.34 KG/M2 | SYSTOLIC BLOOD PRESSURE: 146 MMHG | WEIGHT: 225.19 LBS | DIASTOLIC BLOOD PRESSURE: 87 MMHG | TEMPERATURE: 98.3 F | OXYGEN SATURATION: 94 %

## 2021-05-28 NOTE — PROGRESS NOTES
Patient: ZARA NERI     Acct: [id number]     Report: #[id number]  UNIT #: [id number]     : 1960    Encounter Date:2019  PRIMARY CARE: Mark Howe  ***Signed***  --------------------------------------------------------------------------------------------------------------------  Chief Complaint      Encounter Date      May 31, 2019            Primary Care Provider      Mark Howe            Referring Provider      Chang Lowe            Patient Complaint      Patient is complaining of      New pt here for [facility] f/u            VITALS      Height 5 ft 7 in / 170.18 cm      Weight 225 lbs 3 oz / 102.440945 kg      BSA 2.13 m2      BMI 35.3 kg/m2      Temperature 98.3 F / 36.83 C - Oral      Pulse 74      Respirations 14      Blood Pressure 146/87 Sitting, Right Arm      Pulse Oximetry 94%, Room air            HPI      The patient is a very pleasant 58 year old male here today for follow up.             The patient had a secondary parapneumonic effusion from intraabdominal abscess     that was drained by Dr. Montana in the hospital. The patient was sent home but     prior to being discharged home, he had a small effusion. He was scheduled to     have a drain placed for some abdominal fluid. At the time the drain was placed     and has subsequently been removed. The patient had a chest x-ray on 19     that showed mild to moderate left sided effusion still present. The patient     denies having any fever or chills at this time. He denies having any shortness     of breath now. He previously had some shortness of breath prior to being     discharged but it is improved and he is back to work. 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 He will occasionally     have some cough. He has no other associated factors at this time. He does have     some mild left sided lateral pain, sharp in nature and hard to ascertain if it     is from his drain site  that he had previous or if it is from the pleural space.     The patient denies having any pleuritic pain or pain when he takes a deep breath    .            ROS      Constitutional:  Denies: Fatigue, Fever, Weight gain, Weight loss, Chills,     Insomnia, Other      Respiratory/Breathing:  Complains of: Cough; Denies: Shortness of air, Wheezing,    Hemoptysis, Pleuritic pain, Other      Endocrine:  Denies: Polydipsia, Polyuria, Heat/cold intolerance, Diabetes, Other      Eyes:  Denies: Blurred vision, Vision Changes, Other      Ears, nose, mouth, throat:  Denies: Mouth lesions, Thrush, Throat pain,     Hoarseness, Allergies/Hay Fever, Post Nasal Drip, Headaches, Recent Head Injury,    Nose Bleeding, Neck Stiffness, Thyroid Mass, Hearing Loss, Ear Fullness, Dry     Mouth, Nasal or Sinus Pain, Dry Lips, Nasal discharge, Nasal congestion, Other      Cardiovascular:  Denies: Palpitations, Syncope, Claudication, Chest Pain, Wake     up Gasping for air, Leg Swelling, Irregular Heart Rate, Cyanosis, Dyspnea on     Exertion, Other      Gastrointestinal:  Denies: Nausea, Constipation, Diarrhea, Abdominal pain,     Vomiting, Difficulty Swallowing, Reflux/Heartburn, Dysphagia, Jaundice,     Bloating, Melena, Bloody stools, Other      Genitourinary:  Denies: Urinary frequency, Incontinence, Hematuria, Urgency,     Nocturia, Dysuria, Testicular problems, Other      Musculoskeletal:  Denies: Joint Pain, Joint Stiffness, Joint Swelling, Myalgias,    Other      Hematologic/lymphatic:  DENIES: Lymphadenopathy, Bruising, Bleeding tendencies,     Other      Neurological:  Denies: Headache, Numbness, Weakness, Seizures, Other      Psychiatric:  Denies: Anxiety, Appropriate Effect, Depression, Other      Sleep:  No: Excessive daytime sleep, Morning Headache?, Snoring, Insomnia?, Stop    breathing at sleep?, Other      Integumentary:  Denies: Rash, Dry skin, Skin Warm to Touch, Other      Immunologic/Allergic:  Denies: Latex allergy,  Seasonal allergies, Asthma,     Urticaria, Eczema, Other      Immunization status:  No: Up to date            FAMILY/SOCIAL/MEDICAL HX      Surgical History:  Yes: Bowel Surgery (COLONOSCOPY), Cholecystectomy (2019),     Orthopedic Surgery (RIGHT ANKLE CLOSED REDUCTION 1977/ LEFT HIP REPLACEMENT),     Other Surgeries (MARIAN drains); No: Abdominal Surgery, Appendectomy, CABG, Head     Surgery, Oral Surgery, Vascular Surgery      Stroke - Family Hx:  Mother      Cancer/Type - Family Hx:  Father      Other Family Medical History:  Father      Is Father Still Living?:  No      Is Mother Still Living?:  Yes      Smoking status:  Never smoker      Anticoagulation Therapy:  Yes      Antibiotic Prophylaxis:  No      Medical History:  Yes: Arthritis (OSTEOARTHRITIS LEFT HIP), High Blood Pressure     (CONTROLLED BY MED); No: Blood Disease, Chemotherapy/Cancer, Congestive Heart     Failu, Deafness or Ringing Ears, Diabetes, Seizures, Heart Attack,     Hemorrhoids/Rectal Prob, Shortness Of Breath, Miscellaneous Medical/oth            PREVENTION      Hx Influenza Vaccination:  Yes      Date Influenza Vaccine Given:  Nov 1, 2018      Influenza Vaccine Declined:  No      2 or More Falls Past Year?:  No      Fall Past Year with Injury?:  No      Hx Pneumococcal Vaccination:  No      Encouraged to follow-up with:  PCP regarding preventative exams.      Chart initiated by      Yani Vasquez Ma            ALLERGIES/MEDICATIONS      Allergies:        Coded Allergies:             NO KNOWN ALLERGIES (Unverified , 5/31/19)      Medications    Last Reconciled on 5/31/19 08:11 by AUSTIN BRISCOE MD      Apixaban (Eliquis) 2.5 Mg Tablet      2.5 MG PO BID for 30 Days, #60 TAB         Reported         5/31/19       Metoprolol Succinate (Metoprolol Succinate*) 50 Mg Tab.er.24h      50 MG PO BID, #60 TAB.ER 3 Refills         Prov: MARIO KIRKLAND         5/9/19       Aspirin Chew (Aspirin Baby) 81 Mg Tab.chew      81 MG PO Q2D, #30 TAB.CHEW 0  Refills         Prov: MARIO KIRKLAND         5/9/19       Cholecalciferol (Vitamin D3*) 1,000 Unit Tab      1000 UNITS PO QDAY, #30 TAB 0 Refills         Reported         4/29/19       Hydrochlorothiazide (Hydrochlorothiazide*) 12.5 Mg Capsule      12.5 MG PO Q2D, #30 CAP 0 Refills         Reported         4/19/17       Rosuvastatin Calcium (Crestor*) 20 Mg Tablet      20 MG PO Q2D, TAB         Reported         8/9/13      Current Medications      Current Medications Reviewed 5/31/19            EXAM      Vital Signs Reviewed      Gen: WDWN, Alert, NAD.        HEENT:  PERRL, EOMI.  OP, nares clear, no sinus tenderness.      Neck:  Supple, no JVD, no thyromegaly.      Lymph: No axillary, cervical, supraclavicular lymphadenopathy noted bilaterally.      Chest:  Auscultation reveals slightly diminished breath sounds on the right     side, there are faint left crackles present on inhalation, no wheezes present.     Chest is mildly dull to percussion on the left side, normal to percussion on the    right side. The patient is speaking full sentences without any difficulty.      CV:  RRR, no MGR, pulses 2+, equal.      Abd:  Soft, NT, ND, + BS, no HSM.      EXT:  No clubbing, no cyanosis, no edema, no joint tenderness.       Neuro:  A  Skin: No rashes or lesions.      Vtials      Vitals:             Height 5 ft 7 in / 170.18 cm           Weight 225 lbs 3 oz / 102.695948 kg           BSA 2.13 m2           BMI 35.3 kg/m2           Temperature 98.3 F / 36.83 C - Oral           Pulse 74           Respirations 14           Blood Pressure 146/87 Sitting, Right Arm           Pulse Oximetry 94%, Room air            REVIEW      Results Reviewed      PCCS Results Reviewed?:  Yes Prev Lab Results, Yes Prev Radiology Results, Yes     Previous [facility] Records            Assessment      Notes      New Medications      * Apixaban (Eliquis) 2.5 MG TABLET: 2.5 MG PO BID 30 Days #60      New Diagnostics      * Chest 2 View, 2 Week         Dx:  Cough - R05      ASSESSMENT:      1. Left sided pleural effusion.             PLAN:      1. We will send the patient to the hospital today to have an ultrasound by Dr. Montana and if there is any fluid present we will plan on draining the fluid.       2. If there is indeed fluid we will plan on repeating chest x-ray in 2 weeks.       3. Continue current antibiotics he was on for his intraabdominal infection.       4. The patient has been off eliquis for more than 4 days and he should be okay     perform thoracentesis.       5.  I have personally reviewed laboratory data, imaging and previous medical     records.            Patient Education      Education resources provided:  Yes (pleural effusion)                 Disclaimer: Converted document may not contain table formatting or lab diagrams. Please see [employer] [facility] for the authenticated document.

## 2021-07-27 ENCOUNTER — TELEPHONE (OUTPATIENT)
Dept: ORTHOPEDIC SURGERY | Facility: CLINIC | Age: 61
End: 2021-07-27

## 2021-07-28 PROCEDURE — 84550 ASSAY OF BLOOD/URIC ACID: CPT | Performed by: NURSE PRACTITIONER

## 2021-07-29 ENCOUNTER — TELEPHONE (OUTPATIENT)
Dept: URGENT CARE | Facility: CLINIC | Age: 61
End: 2021-07-29

## 2021-07-29 NOTE — TELEPHONE ENCOUNTER
----- Message from LON Ybarra sent at 7/28/2021  9:17 PM EDT -----  Please call the patient regarding his negative result.

## 2021-08-06 ENCOUNTER — OFFICE VISIT (OUTPATIENT)
Dept: ORTHOPEDIC SURGERY | Facility: CLINIC | Age: 61
End: 2021-08-06

## 2021-08-06 VITALS — OXYGEN SATURATION: 96 % | BODY MASS INDEX: 34.06 KG/M2 | HEART RATE: 82 BPM | HEIGHT: 67 IN | WEIGHT: 217 LBS

## 2021-08-06 DIAGNOSIS — Z96.642 HISTORY OF TOTAL LEFT HIP REPLACEMENT: Primary | ICD-10-CM

## 2021-08-06 DIAGNOSIS — T56.0X1D: ICD-10-CM

## 2021-08-06 DIAGNOSIS — M10.171: ICD-10-CM

## 2021-08-06 PROCEDURE — 99203 OFFICE O/P NEW LOW 30 MIN: CPT | Performed by: ORTHOPAEDIC SURGERY

## 2021-08-06 RX ORDER — METHYLPREDNISOLONE 4 MG/1
1 TABLET ORAL DAILY
Qty: 21 TABLET | Refills: 0 | Status: SHIPPED | OUTPATIENT
Start: 2021-08-06 | End: 2021-08-12

## 2021-08-06 NOTE — PROGRESS NOTES
"Chief Complaint  Initial Evaluation and Pain of the Left Hip and Initial Evaluation and Pain of the Right Ankle     Subjective      Paolo Goldstein presents to [facility] for an evaluation of left hip and right ankle. Patient states he has a bad case of gout. He was given a steroid injection and was placed on some medication that has given relief of his gout symptoms. He states the gout caused increasing left hip pain. He is using a cane for ambulation assistance and patient reports still having a significant limp.     No Known Allergies     Social History     Socioeconomic History   • Marital status:      Spouse name: Not on file   • Number of children: Not on file   • Years of education: Not on file   • Highest education level: Not on file   Tobacco Use   • Smoking status: Never Smoker   • Smokeless tobacco: Never Used   Substance and Sexual Activity   • Alcohol use: Yes     Comment: occasionally   • Drug use: Never        Review of Systems     Objective   Vital Signs:   Pulse 82   Ht 170.2 cm (67\")   Wt 98.4 kg (217 lb)   SpO2 96%   BMI 33.99 kg/m²       Physical Exam  Constitutional:       Appearance: Normal appearance. He is well-developed and normal weight.   HENT:      Head: Normocephalic.      Right Ear: Hearing and external ear normal.      Left Ear: Hearing and external ear normal.      Nose: Nose normal.   Eyes:      Conjunctiva/sclera: Conjunctivae normal.   Cardiovascular:      Rate and Rhythm: Normal rate.   Pulmonary:      Effort: Pulmonary effort is normal.      Breath sounds: No wheezing or rales.   Abdominal:      Palpations: Abdomen is soft.      Tenderness: There is no abdominal tenderness.   Musculoskeletal:      Cervical back: Normal range of motion.   Skin:     Findings: No rash.   Neurological:      Mental Status: He is alert and oriented to person, place, and time.   Psychiatric:         Mood and Affect: Mood and affect normal.         Judgment: " Judgment normal.       Ortho Exam      LEFT HIP: Good tone of hip flexors, hip extensors, hip adductor, hip abductors. Good strength to hamstrings, quadriceps, dorsiflexors and plantar flexors. Dorsal Pedal Pulse 2+, posterior tibialis pulse 2+. Calf supple, non-tender. Full passive hip range of motion. Scars well healed. Non-tender hip and pelvic muscles. Full leg raise. Skin intact.     RIGHT ANKLE: Achilles intact. Dorsal Pedal Pulse 2+, posterior tibialis pulse 2+. Calf supple, non-tender. Limping gait. Good strength to hamstrings, quadriceps, dorsiflexors and plantar flexors. Sensation grossly intact. Neurovascular intact. Redness about the ankle. Swelling. Gout is evident. Weight bearing. Ambulation with crutches.       Procedures      Imaging Results (Most Recent)     Procedure Component Value Units Date/Time    XR Hip With or Without Pelvis 2 - 3 View Left [[id number]] Resulted: 08/06/21 0825     Updated: 08/06/21 0827           Result Review :       XR Ankle 3+ View Right    Result Date: 7/26/2021  Narrative: PROCEDURE: XR ANKLE 3+ VW RIGHT  COMPARISON: [facility], , BONE SURVEY, 2/10/2021, 13:33.  INDICATIONS: right ankle pain  FINDINGS:  There are small osseous densities distal to the medial malleolus on the first image.  No additional findings concerning for fracture are identified.  Tail ting of the talus appears stable.  There is arthritic change of the dorsal foot, as well as a small plantar calcaneal spur.  There is a stable 1-2 mm radiodensity in the lateral soft tissues of the lower leg lateral to the distal fibula.  CONCLUSION:  1. Small osseous densities distal to the medial malleolus, which may be due to age-indeterminate fracture fragments.  Recommend correlation with physical exam. 2. Arthritic changes of the dorsal foot.  Small plantar calcaneal spur. 3. Stable possible 1-2 mm radiopaque foreign body lateral to the distal fibula.      FLASH RODRIGUEZ MD        Electronically Signed and Approved By: FLASH RODRIGUEZ MD on 7/26/2021 at 17:57                   X-Ray Report:  Left hip(s) X-Ray  Indication: Evaluation of left hip  AP and Lateral view(s)  Findings: Intact left total hip arthroplasty. No signs of hardware complications.   Prior studies available for comparison: no     Assessment and Plan     DX: History of left total hip arthroplasty   Gout, right ankle    Discussed treatment plans and diagnosis with the patient. Patient was placed onto a Medrol dose pack. He will follow-up with his PCP on his night sweats.     Call or return if worsening symptoms.    Follow Up     PRN.      Patient was given instructions and counseling regarding his condition or for health maintenance advice. Please see specific information pulled into the AVS if appropriate.     Scribed for Cassius Reilly MD by Elaine June.  08/06/21   08:14 EDT    I have personally performed the services described in this document as scribed by the above individual and it is both accurate and complete. Cassius Reilly MD 08/06/21

## 2021-08-12 ENCOUNTER — LAB (OUTPATIENT)
Dept: LAB | Facility: HOSPITAL | Age: 61
End: 2021-08-12

## 2021-08-12 ENCOUNTER — TRANSCRIBE ORDERS (OUTPATIENT)
Dept: LAB | Facility: HOSPITAL | Age: 61
End: 2021-08-12

## 2021-08-12 DIAGNOSIS — M10.9 GOUT, UNSPECIFIED CAUSE, UNSPECIFIED CHRONICITY, UNSPECIFIED SITE: Primary | ICD-10-CM

## 2021-08-12 DIAGNOSIS — M10.9 GOUT, UNSPECIFIED CAUSE, UNSPECIFIED CHRONICITY, UNSPECIFIED SITE: ICD-10-CM

## 2021-08-12 LAB — URATE SERPL-MCNC: 7.3 MG/DL (ref 3.4–7)

## 2021-08-12 PROCEDURE — 36415 COLL VENOUS BLD VENIPUNCTURE: CPT

## 2021-08-12 PROCEDURE — 84550 ASSAY OF BLOOD/URIC ACID: CPT

## 2021-08-13 ENCOUNTER — TELEPHONE (OUTPATIENT)
Dept: INTERNAL MEDICINE | Facility: CLINIC | Age: 61
End: 2021-08-13

## 2021-08-13 NOTE — TELEPHONE ENCOUNTER
Pt states that for 5 weeks, Dr. Aguilera has been treating him for Gout, with Medrol. Colchicine, along with injections.   They repeated uric acid and it is now a 7. Dr. Aguilera wanted him to call and see if you had any other ideas. He hasn't been able to walk for 4 weeks now.

## 2021-08-14 PROBLEM — K59.1 FUNCTIONAL DIARRHEA: Status: ACTIVE | Noted: 2021-08-14

## 2021-08-14 PROBLEM — N18.31 STAGE 3A CHRONIC KIDNEY DISEASE (HCC): Status: ACTIVE | Noted: 2021-08-14

## 2021-08-14 PROBLEM — D47.2 MONOCLONAL GAMMOPATHY OF UNDETERMINED SIGNIFICANCE: Status: ACTIVE | Noted: 2021-08-14

## 2021-08-14 PROBLEM — D75.1 POLYCYTHEMIA: Status: ACTIVE | Noted: 2021-08-14

## 2021-08-14 PROBLEM — I50.9 CONGESTIVE HEART FAILURE: Status: ACTIVE | Noted: 2021-08-14

## 2021-08-14 PROBLEM — R74.8 ELEVATED LIVER ENZYMES: Status: ACTIVE | Noted: 2021-08-14

## 2021-08-14 PROBLEM — E78.2 MIXED HYPERLIPIDEMIA: Status: ACTIVE | Noted: 2021-08-14

## 2021-08-14 PROBLEM — I10 HYPERTENSION, ESSENTIAL: Status: ACTIVE | Noted: 2021-08-14

## 2021-08-14 PROBLEM — I48.0 PAROXYSMAL ATRIAL FIBRILLATION: Status: ACTIVE | Noted: 2021-08-14

## 2021-08-14 NOTE — PROGRESS NOTES
Chief Complaint  Gout,  R ANKLE PAIN, BEEN TO CARE FIRST, TOOK MEDROL DOSE PACK , AND NSAIDS BEEN TO ACUTE CARE TWICE, THINKS IT IS GOUT? DID LABS ---URIC ACID ---6.7 ---COLCHICINE THEN SWELLED ALL OVER , THEN SAW PODIATRY, AND DR RENAE IBARRA --MEDROL DOSE PACK AND THEN JOHNNY INJECTED OTHER SIDE OF FOOT , URIC ACID GOING UP , BEEN DRINKING AND DOING CHERRY JUICE --  SWELLING WORSE--    Subjective          Paolo Goldstein presents to [facility]      Objective   Vital Signs  Vitals:    08/18/21 1616   BP: 113/78   BP Location: Right arm   Patient Position: Sitting   Pulse: 109      Review of Systems   Physical Exam patient with swelling warmth tenderness to both medial and lateral aspects of the ankles just anterior and below the medial malleolus lateral malleolus, patient has swelling of the entire ankle and distal lower PreTAB region, there is a 2+ DP PT pulse, there is some tenderness and swelling of the dorsum of the foot and the plantar aspect of the foot and ankle area, there is no open areas no rash, patient has limited range of motion with the right ankle,  Result Review :   No results found for: PROBNP, BNP  CMP    CMP 3/12/21   Glucose 92   BUN 19   Creatinine 1.19   Sodium 138   Potassium 4.3   Chloride 103   Calcium 9.2   Albumin 4.2   Total Bilirubin 0.53   Alkaline Phosphatase 69   AST (SGOT) 22   ALT (SGPT) 29           CBC w/diff    CBC w/Diff 1/15/21 3/12/21 5/6/21   WBC  6.61    RBC  5.61    Hemoglobin 16.9 17.3 17.7   Hematocrit 49.8 52.1 (A) 51.1   MCV  92.9    MCH  30.8    MCHC  33.2    RDW  12.6    Platelets  196    Neutrophil Rel %  65.9    Lymphocyte Rel %  20.6    Monocyte Rel %  8.3    Eosinophil Rel %  4.1    Basophil Rel %  0.8    (A) Abnormal value             Lipid Panel    Lipid Panel 3/12/21   Total Cholesterol 122   Triglycerides 70   HDL Cholesterol 36 (A)   VLDL Cholesterol 14   LDL Cholesterol  72   (A) Abnormal value       Comments are available for  some flowsheets but are not being displayed.            Lab Results   Component Value Date    TSH 2.070 08/02/2019    TSH 0.788 05/04/2019      Lab Results   Component Value Date    FREET4 0.9 05/04/2019                          Assessment and Plan    Right ankle pain swelling ongoing for 5 weeks, issue is not resolving with several different rounds of medications including 2 rounds of steroids anti-inflammatories,  Rule out complex regional pain syndrome, versus gout versus pseudogout versus inflammatory arthritides of unknown etiology at this point, will start with colchicine 0.6 mg twice daily to 3 times daily, side effects of diarrhea, discussed, will also start with allopurinol 300 mg daily, and MRI of the right ankle, patient is to try to avoid a lot of walking or strenuous activity on it, August 18, 2021,    LAP CHOLEY, AND POST OP BILE LEAK/ CBD OBSTRUCTION, READMITTED,AND HAD ERCP WITH DR PICKARD--- DEVELOPED AFIB WITH RVR, SEPTIC , ELEVATED WBC, APRIL 2019, CARDIOVERTED, WAS RX WITH ABX, HAD MARIAN DRAIN PLACED BY RADIOLOGY-- AND THEN ANOTHER AMRIAN DRAIN PLACED, --THORACENTISIS ALSO DONE TIMES 2, --HAD STENT REMOVED JUNE 2019, HAD STRESS TEST WITH KIRKLAND,, May 2019, no evidence of ischemia and normal myocardial SPECT perfusion study,-------CARDIAC EVENT MONITOR 8/2019, --NO AFIB ------- ON ELIQUIS -- AUG 2019, , Stopped eliquis October 2019    VASC CALCIFICATIONS SEEN ON CAROTIDS ---ON PLAIN XRAY PER ISAAC FOR BONE SURVERY ---WILL DO CAROTID U/S SOON , MARCH 2021    --DUMPING SYNDROME , RX COLESTID ---SEPT 2020, ---resolved with keto diet and weight loss, currently not taking Colestid, March 2021,    POLYCYTHEMIA, ----- recommend phlebotomy 500 cc of blood due to hematocrit , Discussed March 2021, patient will consider and we can set up for phlebotomy hematocrit 52, March 13, 2021,    Elevated creatinine, baseline 1.2 previous 3 years, currently 1.3 April 2018, Slightly elevated again 1.4 patient will drink more  fluids, September 2020---marked improvement with weight loss, keto diet, creatinine 1. 1 March 2021,    HTN--ATACAND 16 MG QD, METOPROLOL 50 QOD AND 75 QOD , HCTZ 12.5 MG QOD    ELEVATED CHOL.--CRESTOR 10 QHS--Marked improvement with diet and weight loss March 2021    ELEVATED LFTS, KEENAN--NORMAL NOW DEC 2016, , April 2018, ultrasounds liver reviewed, October 2017 and March 2017, shows improvement in liver size,---Has had ultrasounds and fibrosis scan October 2019, slightly elevated score of 0.39 stage F1 to F2, moderate steatosis, has been followed by GI service, current liver enzymes are normal, September 2020---current ultrasound March 5, 2021 shows normal echogenicity of the liver texture, no liver lesions, no biliary ductal dilation, otherwise unremarkable    MONOCLONAL PARAPROTEINURIA, DX IN 2015-16, NOW GOING TO [place] FOR F/U Total proteins have been stable over the last several years through [facility], in follow-up, no treatment at this time,===    OA, OF L HIP AND NICOLAS 12/13, KAREN  OVERWGT--GOOD  LVH ON EKG AND ECHO 2016  LAST CSCOPE 9/13 AND EGD , AND AUG 2017, NEERAJ   PSA =1.1 , APRIL 2018,, PSA 1.34 September 2020      Follow Up {Instructions Charge Capture  Follow-up Communications :23}  No follow-ups on file.  Patient was given instructions and counseling regarding his condition or for health maintenance advice. Please see specific information pulled into the AVS if appropriate.

## 2021-08-18 ENCOUNTER — OFFICE VISIT (OUTPATIENT)
Dept: INTERNAL MEDICINE | Facility: CLINIC | Age: 61
End: 2021-08-18

## 2021-08-18 VITALS — SYSTOLIC BLOOD PRESSURE: 113 MMHG | DIASTOLIC BLOOD PRESSURE: 78 MMHG | HEART RATE: 109 BPM

## 2021-08-18 DIAGNOSIS — D47.2 MONOCLONAL GAMMOPATHY OF UNDETERMINED SIGNIFICANCE: Primary | ICD-10-CM

## 2021-08-18 DIAGNOSIS — N18.31 STAGE 3A CHRONIC KIDNEY DISEASE (HCC): ICD-10-CM

## 2021-08-18 DIAGNOSIS — I48.0 PAROXYSMAL ATRIAL FIBRILLATION (HCC): ICD-10-CM

## 2021-08-18 DIAGNOSIS — Z12.5 SCREENING PSA (PROSTATE SPECIFIC ANTIGEN): ICD-10-CM

## 2021-08-18 DIAGNOSIS — R74.8 ELEVATED LIVER ENZYMES: ICD-10-CM

## 2021-08-18 DIAGNOSIS — I50.9 CONGESTIVE HEART FAILURE, UNSPECIFIED HF CHRONICITY, UNSPECIFIED HEART FAILURE TYPE (HCC): ICD-10-CM

## 2021-08-18 DIAGNOSIS — E78.2 MIXED HYPERLIPIDEMIA: ICD-10-CM

## 2021-08-18 DIAGNOSIS — D75.1 POLYCYTHEMIA: ICD-10-CM

## 2021-08-18 DIAGNOSIS — I10 HYPERTENSION, ESSENTIAL: ICD-10-CM

## 2021-08-18 DIAGNOSIS — K59.1 FUNCTIONAL DIARRHEA: ICD-10-CM

## 2021-08-18 PROCEDURE — 99213 OFFICE O/P EST LOW 20 MIN: CPT | Performed by: INTERNAL MEDICINE

## 2021-08-18 RX ORDER — GABAPENTIN 100 MG/1
100 CAPSULE ORAL 3 TIMES DAILY
Qty: 90 CAPSULE | Refills: 3 | Status: SHIPPED | OUTPATIENT
Start: 2021-08-18 | End: 2021-12-13

## 2021-08-18 RX ORDER — COLCHICINE 0.6 MG/1
0.6 TABLET ORAL 2 TIMES DAILY
Qty: 60 TABLET | Refills: 5 | Status: SHIPPED | OUTPATIENT
Start: 2021-08-18 | End: 2021-12-13

## 2021-08-18 RX ORDER — INDOMETHACIN 50 MG/1
CAPSULE ORAL
COMMUNITY
Start: 2021-08-17 | End: 2021-12-13

## 2021-08-18 RX ORDER — COLCHICINE 0.6 MG/1
TABLET ORAL
COMMUNITY
Start: 2021-07-29 | End: 2021-12-13 | Stop reason: SDUPTHER

## 2021-08-18 RX ORDER — PANTOPRAZOLE SODIUM 40 MG/1
40 TABLET, DELAYED RELEASE ORAL DAILY
Qty: 30 TABLET | Refills: 5 | Status: SHIPPED | OUTPATIENT
Start: 2021-08-18 | End: 2022-05-03

## 2021-08-18 RX ORDER — METOPROLOL SUCCINATE 25 MG/1
75 TABLET, EXTENDED RELEASE ORAL EVERY OTHER DAY
COMMUNITY
Start: 2021-08-06 | End: 2021-12-13

## 2021-08-18 RX ORDER — ALLOPURINOL 300 MG/1
300 TABLET ORAL DAILY
Qty: 30 TABLET | Refills: 5 | Status: SHIPPED | OUTPATIENT
Start: 2021-08-18 | End: 2022-04-12

## 2021-08-19 ENCOUNTER — TELEPHONE (OUTPATIENT)
Dept: CARDIOLOGY | Facility: CLINIC | Age: 61
End: 2021-08-19

## 2021-08-19 NOTE — TELEPHONE ENCOUNTER
Received VM from patient stating that PCP started on colchicine for gout and he is currently on crestor and is concerned with interactions.    Please advise.

## 2021-08-19 NOTE — TELEPHONE ENCOUNTER
There is a warning between colchicine and rosuvastatin for possible muscle aches and myopathy.  How long is he going to be taking colchicine?  If taking short-term we can either monitor closely or stop the Crestor while he is on the colchicine.

## 2021-08-20 ENCOUNTER — TELEPHONE (OUTPATIENT)
Dept: INTERNAL MEDICINE | Facility: CLINIC | Age: 61
End: 2021-08-20

## 2021-08-20 NOTE — TELEPHONE ENCOUNTER
Okay to stop for 1 month while on colchicine.  Make sure he is back on rosuvastatin for at least 2 months before he gets labs done for office visit.

## 2021-08-20 NOTE — TELEPHONE ENCOUNTER
Patient says the scan showed he may have an infection and if he needs antibotics he need them to the pharmacy he is going out of town.  [facility] on [address] [facility] [address] or any other [facility] that [facility] later

## 2021-08-20 NOTE — TELEPHONE ENCOUNTER
S/W patient regarding Ana Maria's recommendations. Patient stated that he will be on colchicine for 1 month and would prefer to stope rosuvastatin while on colchicine. Advised patient I would make Ana Maria aware and for him to restart rosuvastatin once he finishes colchicine.

## 2021-08-23 DIAGNOSIS — M25.571 ACUTE RIGHT ANKLE PAIN: Primary | ICD-10-CM

## 2021-08-26 ENCOUNTER — TELEPHONE (OUTPATIENT)
Dept: INTERNAL MEDICINE | Facility: CLINIC | Age: 61
End: 2021-08-26

## 2021-08-26 DIAGNOSIS — M25.571 ACUTE RIGHT ANKLE PAIN: Primary | ICD-10-CM

## 2021-08-26 NOTE — TELEPHONE ENCOUNTER
Okay to refer to Enma  , that is not a problem at all, he can call or we can call if he wants us to try to do that let me know

## 2021-08-26 NOTE — TELEPHONE ENCOUNTER
Patient called and stated he could not get a hold of the foot & ankle specialist who you had referred him to and wanted to see if it was okay to be seen with Benjamin Harris? Thanks

## 2021-09-07 ENCOUNTER — LAB REQUISITION (OUTPATIENT)
Dept: LAB | Facility: HOSPITAL | Age: 61
End: 2021-09-07

## 2021-09-07 DIAGNOSIS — Z79.899 OTHER LONG TERM (CURRENT) DRUG THERAPY: ICD-10-CM

## 2021-09-07 DIAGNOSIS — M86.171 OTHER ACUTE OSTEOMYELITIS, RIGHT ANKLE AND FOOT (HCC): ICD-10-CM

## 2021-09-07 LAB
ALBUMIN SERPL-MCNC: 3.5 G/DL (ref 3.5–5.2)
ALBUMIN/GLOB SERPL: 0.9 G/DL
ALP SERPL-CCNC: 69 U/L (ref 39–117)
ALT SERPL W P-5'-P-CCNC: 21 U/L (ref 1–41)
ANION GAP SERPL CALCULATED.3IONS-SCNC: 10.4 MMOL/L (ref 5–15)
AST SERPL-CCNC: 21 U/L (ref 1–40)
BASOPHILS # BLD AUTO: 0.07 10*3/MM3 (ref 0–0.2)
BASOPHILS NFR BLD AUTO: 0.8 % (ref 0–1.5)
BILIRUB SERPL-MCNC: 0.3 MG/DL (ref 0–1.2)
BUN SERPL-MCNC: 15 MG/DL (ref 8–23)
BUN/CREAT SERPL: 14 (ref 7–25)
CALCIUM SPEC-SCNC: 10.1 MG/DL (ref 8.6–10.5)
CHLORIDE SERPL-SCNC: 102 MMOL/L (ref 98–107)
CO2 SERPL-SCNC: 24.6 MMOL/L (ref 22–29)
CREAT SERPL-MCNC: 1.07 MG/DL (ref 0.76–1.27)
CRP SERPL-MCNC: 2.28 MG/DL (ref 0–0.5)
DEPRECATED RDW RBC AUTO: 40.1 FL (ref 37–54)
EOSINOPHIL # BLD AUTO: 0.37 10*3/MM3 (ref 0–0.4)
EOSINOPHIL NFR BLD AUTO: 4.5 % (ref 0.3–6.2)
ERYTHROCYTE [DISTWIDTH] IN BLOOD BY AUTOMATED COUNT: 12.1 % (ref 12.3–15.4)
ERYTHROCYTE [SEDIMENTATION RATE] IN BLOOD: 73 MM/HR (ref 0–20)
GFR SERPL CREATININE-BSD FRML MDRD: 70 ML/MIN/1.73
GLOBULIN UR ELPH-MCNC: 3.7 GM/DL
GLUCOSE SERPL-MCNC: 100 MG/DL (ref 65–99)
HCT VFR BLD AUTO: 44 % (ref 37.5–51)
HGB BLD-MCNC: 14.8 G/DL (ref 13–17.7)
IMM GRANULOCYTES # BLD AUTO: 0.09 10*3/MM3 (ref 0–0.05)
IMM GRANULOCYTES NFR BLD AUTO: 1.1 % (ref 0–0.5)
LYMPHOCYTES # BLD AUTO: 1.42 10*3/MM3 (ref 0.7–3.1)
LYMPHOCYTES NFR BLD AUTO: 17.2 % (ref 19.6–45.3)
MCH RBC QN AUTO: 30.7 PG (ref 26.6–33)
MCHC RBC AUTO-ENTMCNC: 33.6 G/DL (ref 31.5–35.7)
MCV RBC AUTO: 91.3 FL (ref 79–97)
MONOCYTES # BLD AUTO: 0.71 10*3/MM3 (ref 0.1–0.9)
MONOCYTES NFR BLD AUTO: 8.6 % (ref 5–12)
NEUTROPHILS NFR BLD AUTO: 5.59 10*3/MM3 (ref 1.7–7)
NEUTROPHILS NFR BLD AUTO: 67.8 % (ref 42.7–76)
NRBC BLD AUTO-RTO: 0 /100 WBC (ref 0–0.2)
PLATELET # BLD AUTO: 312 10*3/MM3 (ref 140–450)
PMV BLD AUTO: 10.6 FL (ref 6–12)
POTASSIUM SERPL-SCNC: 4.2 MMOL/L (ref 3.5–5.2)
PROT SERPL-MCNC: 7.2 G/DL (ref 6–8.5)
RBC # BLD AUTO: 4.82 10*6/MM3 (ref 4.14–5.8)
SODIUM SERPL-SCNC: 137 MMOL/L (ref 136–145)
VANCOMYCIN TROUGH SERPL-MCNC: 18.8 MCG/ML (ref 5–20)
WBC # BLD AUTO: 8.25 10*3/MM3 (ref 3.4–10.8)

## 2021-09-07 PROCEDURE — 80053 COMPREHEN METABOLIC PANEL: CPT | Performed by: INTERNAL MEDICINE

## 2021-09-07 PROCEDURE — 85025 COMPLETE CBC W/AUTO DIFF WBC: CPT | Performed by: INTERNAL MEDICINE

## 2021-09-07 PROCEDURE — 86140 C-REACTIVE PROTEIN: CPT | Performed by: INTERNAL MEDICINE

## 2021-09-07 PROCEDURE — 85652 RBC SED RATE AUTOMATED: CPT | Performed by: INTERNAL MEDICINE

## 2021-09-07 PROCEDURE — 80202 ASSAY OF VANCOMYCIN: CPT | Performed by: INTERNAL MEDICINE

## 2021-09-13 ENCOUNTER — LAB REQUISITION (OUTPATIENT)
Dept: LAB | Facility: HOSPITAL | Age: 61
End: 2021-09-13

## 2021-09-13 DIAGNOSIS — R94.5 ABNORMAL RESULTS OF LIVER FUNCTION STUDIES: ICD-10-CM

## 2021-09-13 DIAGNOSIS — R73.01 IMPAIRED FASTING GLUCOSE: ICD-10-CM

## 2021-09-13 DIAGNOSIS — I10 ESSENTIAL (PRIMARY) HYPERTENSION: ICD-10-CM

## 2021-09-13 DIAGNOSIS — R78.5 FINDING OF OTHER PSYCHOTROPIC DRUG IN BLOOD: ICD-10-CM

## 2021-09-13 DIAGNOSIS — E78.5 HYPERLIPIDEMIA, UNSPECIFIED: ICD-10-CM

## 2021-09-13 LAB
25(OH)D3 SERPL-MCNC: 33 NG/ML
ALBUMIN SERPL-MCNC: 3.5 G/DL (ref 3.5–5.2)
ALBUMIN/GLOB SERPL: 1 G/DL
ALP SERPL-CCNC: 64 U/L (ref 39–117)
ALT SERPL W P-5'-P-CCNC: 43 U/L (ref 1–41)
ANION GAP SERPL CALCULATED.3IONS-SCNC: 10.8 MMOL/L (ref 5–15)
AST SERPL-CCNC: 27 U/L (ref 1–40)
BASOPHILS # BLD AUTO: 0.07 10*3/MM3 (ref 0–0.2)
BASOPHILS NFR BLD AUTO: 0.9 % (ref 0–1.5)
BILIRUB SERPL-MCNC: 0.3 MG/DL (ref 0–1.2)
BUN SERPL-MCNC: 14 MG/DL (ref 8–23)
BUN/CREAT SERPL: 13 (ref 7–25)
CALCIUM SPEC-SCNC: 9.5 MG/DL (ref 8.6–10.5)
CHLORIDE SERPL-SCNC: 105 MMOL/L (ref 98–107)
CHOLEST SERPL-MCNC: 156 MG/DL (ref 0–200)
CO2 SERPL-SCNC: 24.2 MMOL/L (ref 22–29)
CREAT SERPL-MCNC: 1.08 MG/DL (ref 0.76–1.27)
CRP SERPL-MCNC: 0.52 MG/DL (ref 0–0.5)
DEPRECATED RDW RBC AUTO: 41.8 FL (ref 37–54)
EOSINOPHIL # BLD AUTO: 0.45 10*3/MM3 (ref 0–0.4)
EOSINOPHIL NFR BLD AUTO: 5.7 % (ref 0.3–6.2)
ERYTHROCYTE [DISTWIDTH] IN BLOOD BY AUTOMATED COUNT: 12.5 % (ref 12.3–15.4)
ERYTHROCYTE [SEDIMENTATION RATE] IN BLOOD: 43 MM/HR (ref 0–20)
FOLATE SERPL-MCNC: 4.71 NG/ML (ref 4.78–24.2)
GFR SERPL CREATININE-BSD FRML MDRD: 70 ML/MIN/1.73
GLOBULIN UR ELPH-MCNC: 3.5 GM/DL
GLUCOSE SERPL-MCNC: 94 MG/DL (ref 65–99)
HCT VFR BLD AUTO: 44.7 % (ref 37.5–51)
HDLC SERPL-MCNC: 33 MG/DL (ref 40–60)
HGB BLD-MCNC: 14.9 G/DL (ref 13–17.7)
IMM GRANULOCYTES # BLD AUTO: 0.09 10*3/MM3 (ref 0–0.05)
IMM GRANULOCYTES NFR BLD AUTO: 1.1 % (ref 0–0.5)
LDLC SERPL CALC-MCNC: 107 MG/DL (ref 0–100)
LDLC/HDLC SERPL: 3.24 {RATIO}
LYMPHOCYTES # BLD AUTO: 1.57 10*3/MM3 (ref 0.7–3.1)
LYMPHOCYTES NFR BLD AUTO: 20 % (ref 19.6–45.3)
MCH RBC QN AUTO: 30.8 PG (ref 26.6–33)
MCHC RBC AUTO-ENTMCNC: 33.3 G/DL (ref 31.5–35.7)
MCV RBC AUTO: 92.5 FL (ref 79–97)
MONOCYTES # BLD AUTO: 0.58 10*3/MM3 (ref 0.1–0.9)
MONOCYTES NFR BLD AUTO: 7.4 % (ref 5–12)
NEUTROPHILS NFR BLD AUTO: 5.1 10*3/MM3 (ref 1.7–7)
NEUTROPHILS NFR BLD AUTO: 64.9 % (ref 42.7–76)
NRBC BLD AUTO-RTO: 0 /100 WBC (ref 0–0.2)
PLATELET # BLD AUTO: 281 10*3/MM3 (ref 140–450)
PMV BLD AUTO: 10.7 FL (ref 6–12)
POTASSIUM SERPL-SCNC: 4 MMOL/L (ref 3.5–5.2)
PROT SERPL-MCNC: 7 G/DL (ref 6–8.5)
PSA SERPL-MCNC: 1.3 NG/ML (ref 0–4)
RBC # BLD AUTO: 4.83 10*6/MM3 (ref 4.14–5.8)
SODIUM SERPL-SCNC: 140 MMOL/L (ref 136–145)
TRIGL SERPL-MCNC: 81 MG/DL (ref 0–150)
TSH SERPL DL<=0.05 MIU/L-ACNC: 2.78 UIU/ML (ref 0.27–4.2)
VANCOMYCIN TROUGH SERPL-MCNC: 18.3 MCG/ML (ref 5–20)
VIT B12 BLD-MCNC: 387 PG/ML (ref 211–946)
VLDLC SERPL-MCNC: 16 MG/DL (ref 5–40)
WBC # BLD AUTO: 7.86 10*3/MM3 (ref 3.4–10.8)

## 2021-09-13 PROCEDURE — 82607 VITAMIN B-12: CPT | Performed by: INTERNAL MEDICINE

## 2021-09-13 PROCEDURE — 84443 ASSAY THYROID STIM HORMONE: CPT | Performed by: INTERNAL MEDICINE

## 2021-09-13 PROCEDURE — 85025 COMPLETE CBC W/AUTO DIFF WBC: CPT | Performed by: INTERNAL MEDICINE

## 2021-09-13 PROCEDURE — 80061 LIPID PANEL: CPT | Performed by: INTERNAL MEDICINE

## 2021-09-13 PROCEDURE — 82746 ASSAY OF FOLIC ACID SERUM: CPT | Performed by: INTERNAL MEDICINE

## 2021-09-13 PROCEDURE — 82306 VITAMIN D 25 HYDROXY: CPT | Performed by: INTERNAL MEDICINE

## 2021-09-13 PROCEDURE — 86140 C-REACTIVE PROTEIN: CPT | Performed by: INTERNAL MEDICINE

## 2021-09-13 PROCEDURE — 80053 COMPREHEN METABOLIC PANEL: CPT | Performed by: INTERNAL MEDICINE

## 2021-09-13 PROCEDURE — 85652 RBC SED RATE AUTOMATED: CPT | Performed by: INTERNAL MEDICINE

## 2021-09-13 PROCEDURE — G0103 PSA SCREENING: HCPCS | Performed by: INTERNAL MEDICINE

## 2021-09-13 PROCEDURE — 80202 ASSAY OF VANCOMYCIN: CPT | Performed by: INTERNAL MEDICINE

## 2021-09-16 ENCOUNTER — TELEPHONE (OUTPATIENT)
Dept: INTERNAL MEDICINE | Facility: CLINIC | Age: 61
End: 2021-09-16

## 2021-09-16 NOTE — TELEPHONE ENCOUNTER
FYI:  Patient just wanted you to know that is just had surgery on his leg and is on 24 hour Vancomycin.  Doing well

## 2021-09-19 NOTE — PROGRESS NOTES
Chief Complaint  No chief complaint on file.,  R ANKLE PAIN, BEEN TO CARE FIRST, TOOK MEDROL DOSE PACK , AND NSAIDS BEEN TO ACUTE CARE TWICE, THINKS IT IS GOUT? DID LABS ---URIC ACID ---6.7 ---COLCHICINE THEN SWELLED ALL OVER , THEN SAW PODIATRY, AND DR RENAE IBARRA --MEDROL DOSE PACK AND THEN JOHNNY INJECTED OTHER SIDE OF FOOT , URIC ACID GOING UP , BEEN DRINKING AND DOING CHERRY JUICE --  SWELLING WORSE--    Subjective  --Updated in follow, September 23, 2021---- patient had surgery for I&D and bone biopsy sept 1, 2021--- showing MRSA and gout into the right ankle and foot on September 1, 2021 by Dr. Turner at [facility] [address] --- through [facility], had a PICC line placed, was put on IV antibiotics by infectious disease doctor and is seen, has been on vancomycin every 12 hours at home, for total of 6 weeks----        Paolo Goldstein presents to [facility]      Objective   Vital Signs  There were no vitals filed for this visit.   Review of Systems     Physical Exam  Result Review :    Exam, patient's incision sites are healing well on the right ankle, medially and laterally, there is some swelling of the right foot and ankle 1-2+ with a 2+ PT pulse, cardiac exam shows regular rhythm patient's alert pleasant, lungs are clear anteriorly, PICC line sites intact, right medial upper arm, no redness    No results found for: PROBNP, BNP  CMP    CMP 9/1/21 9/7/21 9/13/21   Glucose  100 (A) 94   BUN  15 14   Creatinine  1.07 1.08   eGFR Non African Am  70 70   Sodium  137 140   Potassium 3.9 4.2 4.0   Chloride  102 105   Calcium  10.1 9.5   Albumin  3.50 3.50   Total Bilirubin  0.3 0.3   Alkaline Phosphatase  69 64   AST (SGOT)  21 27   ALT (SGPT)  21 43 (A)   (A) Abnormal value            CBC w/diff    CBC w/Diff 1/15/21 3/12/21 5/6/21   WBC  6.61    RBC  5.61    Hemoglobin 16.9 17.3 17.7   Hematocrit 49.8 52.1 (A) 51.1   MCV  92.9    MCH  30.8    MCHC  33.2    RDW  12.6    Platelets   196    Neutrophil Rel %  65.9    Lymphocyte Rel %  20.6    Monocyte Rel %  8.3    Eosinophil Rel %  4.1    Basophil Rel %  0.8    (A) Abnormal value             Lipid Panel    Lipid Panel 3/12/21 9/13/21   Total Cholesterol  156   Total Cholesterol 122    Triglycerides 70 81   HDL Cholesterol 36 (A) 33 (A)   VLDL Cholesterol 14 16   LDL Cholesterol  72 107 (A)   LDL/HDL Ratio  3.24   (A) Abnormal value       Comments are available for some flowsheets but are not being displayed.            Lab Results   Component Value Date    TSH 2.780 09/13/2021    TSH 2.070 08/02/2019    TSH 0.788 05/04/2019      Lab Results   Component Value Date    FREET4 0.9 05/04/2019         PSA    PSA 9/13/21   PSA 1.300                            Assessment and Plan    Right ankle pain swelling ongoing for 5 weeks, issue is not resolving with several different rounds of medications including 2 rounds of steroids anti-inflammatories, as of August 2021, patient was and made appointment with orthopedic in [place] as above, had surgery September 1 biopsy is growing out MRSA and osteomyelitis now has PICC line with IV vancomycin for 6 weeks, discussed potential treatment options depending on progress over the next several weeks to include oral doxycycline if necessary for continued treatment, discussed with patient September 23, 2021    Rule out complex regional pain syndrome, versus gout versus pseudogout versus inflammatory arthritides of unknown etiology at this point, will start with colchicine 0.6 mg twice daily to 3 times daily, ---also start with allopurinol 300 mg daily, and MRI of the right ankle, August 18, 2021        LAP CHOLEY, AND POST OP BILE LEAK/ CBD OBSTRUCTION, READMITTED,AND HAD ERCP WITH DR PICKARD--- DEVELOPED AFIB WITH RVR, SEPTIC , ELEVATED WBC, APRIL 2019, CARDIOVERTED, WAS RX WITH ABX, HAD MARIAN DRAIN PLACED BY RADIOLOGY-- AND THEN ANOTHER MARIAN DRAIN PLACED, --THORACENTISIS ALSO DONE TIMES 2, --HAD STENT REMOVED JUNE  2019, HAD STRESS TEST WITH KIRKLAND,, May 2019, no evidence of ischemia and normal myocardial SPECT perfusion study,-------CARDIAC EVENT MONITOR 8/2019, --NO AFIB ------- ON ELIQUIS -- AUG 2019, , Stopped eliquis October 2019    VASC CALCIFICATIONS SEEN ON CAROTIDS ---ON PLAIN XRAY PER ISAAC FOR BONE SURVERY ---WILL DO CAROTID U/S SOON , MARCH 2021    --DUMPING SYNDROME , RX COLESTID ---SEPT 2020, ---resolved with keto diet and weight loss, currently not taking Colestid, March 2021,    POLYCYTHEMIA, ----- recommend phlebotomy 500 cc of blood due to hematocrit , Discussed March 2021, patient will consider and we can set up for phlebotomy hematocrit 52, March 13, 2021,    Elevated creatinine, baseline 1.2 previous 3 years, currently 1.3 April 2018, Slightly elevated again 1.4 patient will drink more fluids, September 2020---marked improvement with weight loss, keto diet, creatinine 1. 1 March 2021,    HTN--ATACAND 16 MG QD, METOPROLOL 50 QOD AND 75 QOD , HCTZ 12.5 MG QOD    ELEVATED CHOL.--CRESTOR 10 QHS--Marked improvement with diet and weight loss March 2021    ELEVATED LFTS, KEENAN--NORMAL NOW DEC 2016, , April 2018, ultrasounds liver reviewed, October 2017 and March 2017, shows improvement in liver size,---Has had ultrasounds and fibrosis scan October 2019, slightly elevated score of 0.39 stage F1 to F2, moderate steatosis, has been followed by GI service, current liver enzymes are normal, September 2020---current ultrasound March 5, 2021 shows normal echogenicity of the liver texture, no liver lesions, no biliary ductal dilation, otherwise unremarkable    MONOCLONAL PARAPROTEINURIA, DX IN 2015-16, NOW GOING TO [place] FOR F/U Total proteins have been stable over the last several years through [facility], in follow-up, no treatment at this time,===    OA, OF L HIP AND NICOLAS 12/13, KAREN  OVERWGT--GOOD  LVH ON EKG AND ECHO 2016  LAST CSCOPE 9/13 AND EGD , AND AUG 2017, NEERAJ   PSA =1.1 , APRIL 2018,, PSA 1.34  September 2020      Follow Up   No follow-ups on file.  Patient was given instructions and counseling regarding his condition or for health maintenance advice. Please see specific information pulled into the AVS if appropriate.

## 2021-09-20 ENCOUNTER — LAB REQUISITION (OUTPATIENT)
Dept: LAB | Facility: HOSPITAL | Age: 61
End: 2021-09-20

## 2021-09-20 DIAGNOSIS — M86.671 OTHER CHRONIC OSTEOMYELITIS, RIGHT ANKLE AND FOOT (HCC): ICD-10-CM

## 2021-09-20 DIAGNOSIS — Z79.899 OTHER LONG TERM (CURRENT) DRUG THERAPY: ICD-10-CM

## 2021-09-20 LAB
ALBUMIN SERPL-MCNC: 3.7 G/DL (ref 3.5–5.2)
ALBUMIN/GLOB SERPL: 1.1 G/DL
ALP SERPL-CCNC: 65 U/L (ref 39–117)
ALT SERPL W P-5'-P-CCNC: 42 U/L (ref 1–41)
ANION GAP SERPL CALCULATED.3IONS-SCNC: 11.4 MMOL/L (ref 5–15)
AST SERPL-CCNC: 24 U/L (ref 1–40)
BASOPHILS # BLD AUTO: 0.08 10*3/MM3 (ref 0–0.2)
BASOPHILS NFR BLD AUTO: 1 % (ref 0–1.5)
BILIRUB SERPL-MCNC: 0.5 MG/DL (ref 0–1.2)
BUN SERPL-MCNC: 17 MG/DL (ref 8–23)
BUN/CREAT SERPL: 16.3 (ref 7–25)
CALCIUM SPEC-SCNC: 9.6 MG/DL (ref 8.6–10.5)
CHLORIDE SERPL-SCNC: 105 MMOL/L (ref 98–107)
CO2 SERPL-SCNC: 23.6 MMOL/L (ref 22–29)
CREAT SERPL-MCNC: 1.04 MG/DL (ref 0.76–1.27)
CRP SERPL-MCNC: 0.46 MG/DL (ref 0–0.5)
DEPRECATED RDW RBC AUTO: 43.7 FL (ref 37–54)
EOSINOPHIL # BLD AUTO: 0.53 10*3/MM3 (ref 0–0.4)
EOSINOPHIL NFR BLD AUTO: 6.9 % (ref 0.3–6.2)
ERYTHROCYTE [DISTWIDTH] IN BLOOD BY AUTOMATED COUNT: 12.8 % (ref 12.3–15.4)
ERYTHROCYTE [SEDIMENTATION RATE] IN BLOOD: 42 MM/HR (ref 0–20)
GFR SERPL CREATININE-BSD FRML MDRD: 73 ML/MIN/1.73
GLOBULIN UR ELPH-MCNC: 3.5 GM/DL
GLUCOSE SERPL-MCNC: 125 MG/DL (ref 65–99)
HCT VFR BLD AUTO: 46.1 % (ref 37.5–51)
HGB BLD-MCNC: 15.4 G/DL (ref 13–17.7)
IMM GRANULOCYTES # BLD AUTO: 0.05 10*3/MM3 (ref 0–0.05)
IMM GRANULOCYTES NFR BLD AUTO: 0.7 % (ref 0–0.5)
LYMPHOCYTES # BLD AUTO: 1.62 10*3/MM3 (ref 0.7–3.1)
LYMPHOCYTES NFR BLD AUTO: 21.1 % (ref 19.6–45.3)
MCH RBC QN AUTO: 31 PG (ref 26.6–33)
MCHC RBC AUTO-ENTMCNC: 33.4 G/DL (ref 31.5–35.7)
MCV RBC AUTO: 92.9 FL (ref 79–97)
MONOCYTES # BLD AUTO: 0.52 10*3/MM3 (ref 0.1–0.9)
MONOCYTES NFR BLD AUTO: 6.8 % (ref 5–12)
NEUTROPHILS NFR BLD AUTO: 4.88 10*3/MM3 (ref 1.7–7)
NEUTROPHILS NFR BLD AUTO: 63.5 % (ref 42.7–76)
NRBC BLD AUTO-RTO: 0 /100 WBC (ref 0–0.2)
PLATELET # BLD AUTO: 272 10*3/MM3 (ref 140–450)
PMV BLD AUTO: 11 FL (ref 6–12)
POTASSIUM SERPL-SCNC: 4.1 MMOL/L (ref 3.5–5.2)
PROT SERPL-MCNC: 7.2 G/DL (ref 6–8.5)
RBC # BLD AUTO: 4.96 10*6/MM3 (ref 4.14–5.8)
SODIUM SERPL-SCNC: 140 MMOL/L (ref 136–145)
VANCOMYCIN TROUGH SERPL-MCNC: 19.1 MCG/ML (ref 5–20)
WBC # BLD AUTO: 7.68 10*3/MM3 (ref 3.4–10.8)

## 2021-09-20 PROCEDURE — 80202 ASSAY OF VANCOMYCIN: CPT | Performed by: INTERNAL MEDICINE

## 2021-09-20 PROCEDURE — 85652 RBC SED RATE AUTOMATED: CPT | Performed by: INTERNAL MEDICINE

## 2021-09-20 PROCEDURE — 85025 COMPLETE CBC W/AUTO DIFF WBC: CPT | Performed by: INTERNAL MEDICINE

## 2021-09-20 PROCEDURE — 80053 COMPREHEN METABOLIC PANEL: CPT | Performed by: INTERNAL MEDICINE

## 2021-09-20 PROCEDURE — 86140 C-REACTIVE PROTEIN: CPT | Performed by: INTERNAL MEDICINE

## 2021-09-23 ENCOUNTER — OFFICE VISIT (OUTPATIENT)
Dept: INTERNAL MEDICINE | Facility: CLINIC | Age: 61
End: 2021-09-23

## 2021-09-23 VITALS
DIASTOLIC BLOOD PRESSURE: 77 MMHG | RESPIRATION RATE: 18 BRPM | SYSTOLIC BLOOD PRESSURE: 124 MMHG | HEART RATE: 76 BPM | WEIGHT: 215 LBS | TEMPERATURE: 97.5 F | OXYGEN SATURATION: 95 % | HEIGHT: 67 IN | BODY MASS INDEX: 33.74 KG/M2

## 2021-09-23 DIAGNOSIS — I10 HYPERTENSION, ESSENTIAL: ICD-10-CM

## 2021-09-23 DIAGNOSIS — M86.071 ACUTE HEMATOGENOUS OSTEOMYELITIS OF RIGHT FOOT (HCC): Primary | ICD-10-CM

## 2021-09-23 DIAGNOSIS — N18.31 STAGE 3A CHRONIC KIDNEY DISEASE (HCC): ICD-10-CM

## 2021-09-23 PROCEDURE — 99213 OFFICE O/P EST LOW 20 MIN: CPT | Performed by: INTERNAL MEDICINE

## 2021-09-23 RX ORDER — ASPIRIN 81 MG/1
81 TABLET ORAL DAILY
COMMUNITY
Start: 2021-09-03 | End: 2021-10-03

## 2021-09-23 RX ORDER — HYDROCODONE BITARTRATE AND ACETAMINOPHEN 10; 325 MG/1; MG/1
1 TABLET ORAL
COMMUNITY
Start: 2021-09-03 | End: 2021-12-13 | Stop reason: SDUPTHER

## 2021-09-23 RX ORDER — HYDROCODONE BITARTRATE AND ACETAMINOPHEN 10; 325 MG/1; MG/1
TABLET ORAL
COMMUNITY
Start: 2021-09-03 | End: 2021-12-13

## 2021-09-27 ENCOUNTER — LAB REQUISITION (OUTPATIENT)
Dept: LAB | Facility: HOSPITAL | Age: 61
End: 2021-09-27

## 2021-09-27 DIAGNOSIS — Z79.899 OTHER LONG TERM (CURRENT) DRUG THERAPY: ICD-10-CM

## 2021-09-27 DIAGNOSIS — M86.671 OTHER CHRONIC OSTEOMYELITIS, RIGHT ANKLE AND FOOT (HCC): ICD-10-CM

## 2021-09-27 LAB
ALBUMIN SERPL-MCNC: 4.1 G/DL (ref 3.5–5.2)
ALBUMIN/GLOB SERPL: 1.1 G/DL
ALP SERPL-CCNC: 64 U/L (ref 39–117)
ALT SERPL W P-5'-P-CCNC: 43 U/L (ref 1–41)
ANION GAP SERPL CALCULATED.3IONS-SCNC: 13.7 MMOL/L (ref 5–15)
AST SERPL-CCNC: 25 U/L (ref 1–40)
BASOPHILS # BLD AUTO: 0.09 10*3/MM3 (ref 0–0.2)
BASOPHILS NFR BLD AUTO: 1.1 % (ref 0–1.5)
BILIRUB SERPL-MCNC: 0.3 MG/DL (ref 0–1.2)
BUN SERPL-MCNC: 26 MG/DL (ref 8–23)
BUN/CREAT SERPL: 21.3 (ref 7–25)
CALCIUM SPEC-SCNC: 10.2 MG/DL (ref 8.6–10.5)
CHLORIDE SERPL-SCNC: 104 MMOL/L (ref 98–107)
CO2 SERPL-SCNC: 21.3 MMOL/L (ref 22–29)
CREAT SERPL-MCNC: 1.22 MG/DL (ref 0.76–1.27)
CRP SERPL-MCNC: <0.3 MG/DL (ref 0–0.5)
DEPRECATED RDW RBC AUTO: 45.7 FL (ref 37–54)
EOSINOPHIL # BLD AUTO: 0.59 10*3/MM3 (ref 0–0.4)
EOSINOPHIL NFR BLD AUTO: 7.4 % (ref 0.3–6.2)
ERYTHROCYTE [DISTWIDTH] IN BLOOD BY AUTOMATED COUNT: 13.5 % (ref 12.3–15.4)
ERYTHROCYTE [SEDIMENTATION RATE] IN BLOOD: 36 MM/HR (ref 0–20)
GFR SERPL CREATININE-BSD FRML MDRD: 60 ML/MIN/1.73
GLOBULIN UR ELPH-MCNC: 3.7 GM/DL
GLUCOSE SERPL-MCNC: 111 MG/DL (ref 65–99)
HCT VFR BLD AUTO: 50.4 % (ref 37.5–51)
HGB BLD-MCNC: 16.7 G/DL (ref 13–17.7)
IMM GRANULOCYTES # BLD AUTO: 0.05 10*3/MM3 (ref 0–0.05)
IMM GRANULOCYTES NFR BLD AUTO: 0.6 % (ref 0–0.5)
LYMPHOCYTES # BLD AUTO: 1.87 10*3/MM3 (ref 0.7–3.1)
LYMPHOCYTES NFR BLD AUTO: 23.6 % (ref 19.6–45.3)
MCH RBC QN AUTO: 31.2 PG (ref 26.6–33)
MCHC RBC AUTO-ENTMCNC: 33.1 G/DL (ref 31.5–35.7)
MCV RBC AUTO: 94 FL (ref 79–97)
MONOCYTES # BLD AUTO: 0.63 10*3/MM3 (ref 0.1–0.9)
MONOCYTES NFR BLD AUTO: 7.9 % (ref 5–12)
NEUTROPHILS NFR BLD AUTO: 4.71 10*3/MM3 (ref 1.7–7)
NEUTROPHILS NFR BLD AUTO: 59.4 % (ref 42.7–76)
NRBC BLD AUTO-RTO: 0 /100 WBC (ref 0–0.2)
PLATELET # BLD AUTO: 240 10*3/MM3 (ref 140–450)
PMV BLD AUTO: 11.3 FL (ref 6–12)
POTASSIUM SERPL-SCNC: 4.2 MMOL/L (ref 3.5–5.2)
PROT SERPL-MCNC: 7.8 G/DL (ref 6–8.5)
RBC # BLD AUTO: 5.36 10*6/MM3 (ref 4.14–5.8)
SODIUM SERPL-SCNC: 139 MMOL/L (ref 136–145)
URATE SERPL-MCNC: 4.6 MG/DL (ref 3.4–7)
VANCOMYCIN TROUGH SERPL-MCNC: 17.1 MCG/ML (ref 5–20)
WBC # BLD AUTO: 7.94 10*3/MM3 (ref 3.4–10.8)

## 2021-09-27 PROCEDURE — 85652 RBC SED RATE AUTOMATED: CPT | Performed by: INTERNAL MEDICINE

## 2021-09-27 PROCEDURE — 86140 C-REACTIVE PROTEIN: CPT | Performed by: INTERNAL MEDICINE

## 2021-09-27 PROCEDURE — 80202 ASSAY OF VANCOMYCIN: CPT | Performed by: INTERNAL MEDICINE

## 2021-09-27 PROCEDURE — 85025 COMPLETE CBC W/AUTO DIFF WBC: CPT | Performed by: INTERNAL MEDICINE

## 2021-09-27 PROCEDURE — 80053 COMPREHEN METABOLIC PANEL: CPT | Performed by: INTERNAL MEDICINE

## 2021-09-27 PROCEDURE — 84550 ASSAY OF BLOOD/URIC ACID: CPT | Performed by: INTERNAL MEDICINE

## 2021-10-04 ENCOUNTER — LAB REQUISITION (OUTPATIENT)
Dept: LAB | Facility: HOSPITAL | Age: 61
End: 2021-10-04

## 2021-10-04 DIAGNOSIS — M86.671 OTHER CHRONIC OSTEOMYELITIS, RIGHT ANKLE AND FOOT (HCC): ICD-10-CM

## 2021-10-04 DIAGNOSIS — Z79.899 OTHER LONG TERM (CURRENT) DRUG THERAPY: ICD-10-CM

## 2021-10-04 LAB
ALBUMIN SERPL-MCNC: 3.8 G/DL (ref 3.5–5.2)
ALBUMIN/GLOB SERPL: 1.4 G/DL
ALP SERPL-CCNC: 54 U/L (ref 39–117)
ALT SERPL W P-5'-P-CCNC: 33 U/L (ref 1–41)
ANION GAP SERPL CALCULATED.3IONS-SCNC: 9.6 MMOL/L (ref 5–15)
AST SERPL-CCNC: 24 U/L (ref 1–40)
BASOPHILS # BLD AUTO: 0.07 10*3/MM3 (ref 0–0.2)
BASOPHILS NFR BLD AUTO: 1.1 % (ref 0–1.5)
BILIRUB SERPL-MCNC: 0.4 MG/DL (ref 0–1.2)
BUN SERPL-MCNC: 17 MG/DL (ref 8–23)
BUN/CREAT SERPL: 18.3 (ref 7–25)
CALCIUM SPEC-SCNC: 9.5 MG/DL (ref 8.6–10.5)
CHLORIDE SERPL-SCNC: 106 MMOL/L (ref 98–107)
CO2 SERPL-SCNC: 24.4 MMOL/L (ref 22–29)
CREAT SERPL-MCNC: 0.93 MG/DL (ref 0.76–1.27)
CRP SERPL-MCNC: 0.35 MG/DL (ref 0–0.5)
DEPRECATED RDW RBC AUTO: 48.1 FL (ref 37–54)
EOSINOPHIL # BLD AUTO: 0.47 10*3/MM3 (ref 0–0.4)
EOSINOPHIL NFR BLD AUTO: 7.1 % (ref 0.3–6.2)
ERYTHROCYTE [DISTWIDTH] IN BLOOD BY AUTOMATED COUNT: 13.8 % (ref 12.3–15.4)
ERYTHROCYTE [SEDIMENTATION RATE] IN BLOOD: 23 MM/HR (ref 0–20)
GFR SERPL CREATININE-BSD FRML MDRD: 83 ML/MIN/1.73
GLOBULIN UR ELPH-MCNC: 2.8 GM/DL
GLUCOSE SERPL-MCNC: 102 MG/DL (ref 65–99)
HCT VFR BLD AUTO: 45.2 % (ref 37.5–51)
HGB BLD-MCNC: 15.1 G/DL (ref 13–17.7)
IMM GRANULOCYTES # BLD AUTO: 0.07 10*3/MM3 (ref 0–0.05)
IMM GRANULOCYTES NFR BLD AUTO: 1.1 % (ref 0–0.5)
LYMPHOCYTES # BLD AUTO: 1.36 10*3/MM3 (ref 0.7–3.1)
LYMPHOCYTES NFR BLD AUTO: 20.6 % (ref 19.6–45.3)
MCH RBC QN AUTO: 31.5 PG (ref 26.6–33)
MCHC RBC AUTO-ENTMCNC: 33.4 G/DL (ref 31.5–35.7)
MCV RBC AUTO: 94.2 FL (ref 79–97)
MONOCYTES # BLD AUTO: 0.6 10*3/MM3 (ref 0.1–0.9)
MONOCYTES NFR BLD AUTO: 9.1 % (ref 5–12)
NEUTROPHILS NFR BLD AUTO: 4.03 10*3/MM3 (ref 1.7–7)
NEUTROPHILS NFR BLD AUTO: 61 % (ref 42.7–76)
NRBC BLD AUTO-RTO: 0 /100 WBC (ref 0–0.2)
PLATELET # BLD AUTO: 187 10*3/MM3 (ref 140–450)
PMV BLD AUTO: 11.7 FL (ref 6–12)
POTASSIUM SERPL-SCNC: 4.2 MMOL/L (ref 3.5–5.2)
PROT SERPL-MCNC: 6.6 G/DL (ref 6–8.5)
RBC # BLD AUTO: 4.8 10*6/MM3 (ref 4.14–5.8)
SODIUM SERPL-SCNC: 140 MMOL/L (ref 136–145)
VANCOMYCIN TROUGH SERPL-MCNC: 17.9 MCG/ML (ref 5–20)
WBC # BLD AUTO: 6.6 10*3/MM3 (ref 3.4–10.8)

## 2021-10-04 PROCEDURE — 80053 COMPREHEN METABOLIC PANEL: CPT | Performed by: INTERNAL MEDICINE

## 2021-10-04 PROCEDURE — 85652 RBC SED RATE AUTOMATED: CPT | Performed by: INTERNAL MEDICINE

## 2021-10-04 PROCEDURE — 80202 ASSAY OF VANCOMYCIN: CPT | Performed by: INTERNAL MEDICINE

## 2021-10-04 PROCEDURE — 86140 C-REACTIVE PROTEIN: CPT | Performed by: INTERNAL MEDICINE

## 2021-10-04 PROCEDURE — 85025 COMPLETE CBC W/AUTO DIFF WBC: CPT | Performed by: INTERNAL MEDICINE

## 2021-10-05 ENCOUNTER — TELEPHONE (OUTPATIENT)
Dept: ORTHOPEDIC SURGERY | Facility: CLINIC | Age: 61
End: 2021-10-05

## 2021-10-05 NOTE — TELEPHONE ENCOUNTER
Patient called and inquired when Dr. Reilly would want to do a follow up of the total left hip replacement.  Patient was seen in office in  August and was xray.  Since he has had an issue with osteomyelitis of the right foot which he has had to have I&D, PICC line and antibiotics.  Dr. Reilly advises that patient make an appointment after the first of the year to be seen.  Patient voices understanding and to call sooner if problems arise.

## 2021-10-11 ENCOUNTER — LAB REQUISITION (OUTPATIENT)
Dept: LAB | Facility: HOSPITAL | Age: 61
End: 2021-10-11

## 2021-10-11 DIAGNOSIS — Z79.899 OTHER LONG TERM (CURRENT) DRUG THERAPY: ICD-10-CM

## 2021-10-11 DIAGNOSIS — M86.671 OTHER CHRONIC OSTEOMYELITIS, RIGHT ANKLE AND FOOT (HCC): ICD-10-CM

## 2021-10-11 LAB
ALBUMIN SERPL-MCNC: 4.2 G/DL (ref 3.5–5.2)
ALBUMIN/GLOB SERPL: 1.4 G/DL
ALP SERPL-CCNC: 57 U/L (ref 39–117)
ALT SERPL W P-5'-P-CCNC: 29 U/L (ref 1–41)
ANION GAP SERPL CALCULATED.3IONS-SCNC: 9.4 MMOL/L (ref 5–15)
AST SERPL-CCNC: 22 U/L (ref 1–40)
BASOPHILS # BLD AUTO: 0.04 10*3/MM3 (ref 0–0.2)
BASOPHILS NFR BLD AUTO: 0.5 % (ref 0–1.5)
BILIRUB SERPL-MCNC: 0.4 MG/DL (ref 0–1.2)
BUN SERPL-MCNC: 16 MG/DL (ref 8–23)
BUN/CREAT SERPL: 15 (ref 7–25)
CALCIUM SPEC-SCNC: 9.2 MG/DL (ref 8.6–10.5)
CHLORIDE SERPL-SCNC: 106 MMOL/L (ref 98–107)
CO2 SERPL-SCNC: 24.6 MMOL/L (ref 22–29)
CREAT SERPL-MCNC: 1.07 MG/DL (ref 0.76–1.27)
CRP SERPL-MCNC: 0.58 MG/DL (ref 0–0.5)
DEPRECATED RDW RBC AUTO: 49 FL (ref 37–54)
EOSINOPHIL # BLD AUTO: 0.45 10*3/MM3 (ref 0–0.4)
EOSINOPHIL NFR BLD AUTO: 6.2 % (ref 0.3–6.2)
ERYTHROCYTE [DISTWIDTH] IN BLOOD BY AUTOMATED COUNT: 13.9 % (ref 12.3–15.4)
GFR SERPL CREATININE-BSD FRML MDRD: 70 ML/MIN/1.73
GLOBULIN UR ELPH-MCNC: 2.9 GM/DL
GLUCOSE SERPL-MCNC: 115 MG/DL (ref 65–99)
HCT VFR BLD AUTO: 45.9 % (ref 37.5–51)
HGB BLD-MCNC: 15 G/DL (ref 13–17.7)
IMM GRANULOCYTES # BLD AUTO: 0.03 10*3/MM3 (ref 0–0.05)
IMM GRANULOCYTES NFR BLD AUTO: 0.4 % (ref 0–0.5)
LYMPHOCYTES # BLD AUTO: 1.34 10*3/MM3 (ref 0.7–3.1)
LYMPHOCYTES NFR BLD AUTO: 18.3 % (ref 19.6–45.3)
MCH RBC QN AUTO: 31.4 PG (ref 26.6–33)
MCHC RBC AUTO-ENTMCNC: 32.7 G/DL (ref 31.5–35.7)
MCV RBC AUTO: 96 FL (ref 79–97)
MONOCYTES # BLD AUTO: 0.59 10*3/MM3 (ref 0.1–0.9)
MONOCYTES NFR BLD AUTO: 8.1 % (ref 5–12)
NEUTROPHILS NFR BLD AUTO: 4.86 10*3/MM3 (ref 1.7–7)
NEUTROPHILS NFR BLD AUTO: 66.5 % (ref 42.7–76)
NRBC BLD AUTO-RTO: 0 /100 WBC (ref 0–0.2)
PLATELET # BLD AUTO: 181 10*3/MM3 (ref 140–450)
PMV BLD AUTO: 11.2 FL (ref 6–12)
POTASSIUM SERPL-SCNC: 3.9 MMOL/L (ref 3.5–5.2)
PROT SERPL-MCNC: 7.1 G/DL (ref 6–8.5)
RBC # BLD AUTO: 4.78 10*6/MM3 (ref 4.14–5.8)
SODIUM SERPL-SCNC: 140 MMOL/L (ref 136–145)
VANCOMYCIN TROUGH SERPL-MCNC: 18 MCG/ML (ref 5–20)
WBC # BLD AUTO: 7.31 10*3/MM3 (ref 3.4–10.8)

## 2021-10-11 PROCEDURE — 86140 C-REACTIVE PROTEIN: CPT | Performed by: INTERNAL MEDICINE

## 2021-10-11 PROCEDURE — 80053 COMPREHEN METABOLIC PANEL: CPT | Performed by: INTERNAL MEDICINE

## 2021-10-11 PROCEDURE — 80202 ASSAY OF VANCOMYCIN: CPT | Performed by: INTERNAL MEDICINE

## 2021-10-11 PROCEDURE — 85025 COMPLETE CBC W/AUTO DIFF WBC: CPT | Performed by: INTERNAL MEDICINE

## 2021-10-12 ENCOUNTER — LAB (OUTPATIENT)
Dept: LAB | Facility: HOSPITAL | Age: 61
End: 2021-10-12

## 2021-10-12 DIAGNOSIS — E78.2 MIXED HYPERLIPIDEMIA: ICD-10-CM

## 2021-10-12 DIAGNOSIS — R74.8 ELEVATED LIVER ENZYMES: ICD-10-CM

## 2021-10-12 DIAGNOSIS — Z12.5 SCREENING PSA (PROSTATE SPECIFIC ANTIGEN): ICD-10-CM

## 2021-10-12 DIAGNOSIS — N18.31 STAGE 3A CHRONIC KIDNEY DISEASE (HCC): ICD-10-CM

## 2021-10-12 DIAGNOSIS — I48.0 PAROXYSMAL ATRIAL FIBRILLATION (HCC): ICD-10-CM

## 2021-10-12 DIAGNOSIS — D47.2 MONOCLONAL GAMMOPATHY OF UNDETERMINED SIGNIFICANCE: ICD-10-CM

## 2021-10-12 DIAGNOSIS — K59.1 FUNCTIONAL DIARRHEA: ICD-10-CM

## 2021-10-12 DIAGNOSIS — I50.9 CONGESTIVE HEART FAILURE, UNSPECIFIED HF CHRONICITY, UNSPECIFIED HEART FAILURE TYPE (HCC): ICD-10-CM

## 2021-10-12 DIAGNOSIS — I10 HYPERTENSION, ESSENTIAL: ICD-10-CM

## 2021-10-12 DIAGNOSIS — D75.1 POLYCYTHEMIA: ICD-10-CM

## 2021-10-12 LAB — ERYTHROCYTE [SEDIMENTATION RATE] IN BLOOD: 24 MM/HR (ref 0–20)

## 2021-10-12 PROCEDURE — 85652 RBC SED RATE AUTOMATED: CPT

## 2021-10-12 PROCEDURE — 36415 COLL VENOUS BLD VENIPUNCTURE: CPT

## 2021-11-05 ENCOUNTER — LAB (OUTPATIENT)
Dept: LAB | Facility: HOSPITAL | Age: 61
End: 2021-11-05

## 2021-11-05 DIAGNOSIS — D47.2 MONOCLONAL GAMMOPATHY OF UNDETERMINED SIGNIFICANCE: ICD-10-CM

## 2021-11-05 DIAGNOSIS — M86.071 ACUTE HEMATOGENOUS OSTEOMYELITIS OF RIGHT FOOT (HCC): ICD-10-CM

## 2021-11-05 DIAGNOSIS — I50.9 CONGESTIVE HEART FAILURE, UNSPECIFIED HF CHRONICITY, UNSPECIFIED HEART FAILURE TYPE (HCC): ICD-10-CM

## 2021-11-05 DIAGNOSIS — K59.1 FUNCTIONAL DIARRHEA: ICD-10-CM

## 2021-11-05 DIAGNOSIS — Z12.5 SCREENING PSA (PROSTATE SPECIFIC ANTIGEN): ICD-10-CM

## 2021-11-05 DIAGNOSIS — N18.31 STAGE 3A CHRONIC KIDNEY DISEASE (HCC): ICD-10-CM

## 2021-11-05 DIAGNOSIS — I48.0 PAROXYSMAL ATRIAL FIBRILLATION (HCC): ICD-10-CM

## 2021-11-05 DIAGNOSIS — I10 HYPERTENSION, ESSENTIAL: ICD-10-CM

## 2021-11-05 DIAGNOSIS — D75.1 POLYCYTHEMIA: ICD-10-CM

## 2021-11-05 DIAGNOSIS — R74.8 ELEVATED LIVER ENZYMES: ICD-10-CM

## 2021-11-05 DIAGNOSIS — E78.2 MIXED HYPERLIPIDEMIA: ICD-10-CM

## 2021-11-05 LAB
25(OH)D3 SERPL-MCNC: 33.4 NG/ML
ALBUMIN SERPL-MCNC: 4.1 G/DL (ref 3.5–5.2)
ALBUMIN/GLOB SERPL: 1.2 G/DL
ALP SERPL-CCNC: 69 U/L (ref 39–117)
ALT SERPL W P-5'-P-CCNC: 31 U/L (ref 1–41)
ANION GAP SERPL CALCULATED.3IONS-SCNC: 7.7 MMOL/L (ref 5–15)
AST SERPL-CCNC: 48 U/L (ref 1–40)
BASOPHILS # BLD AUTO: 0.06 10*3/MM3 (ref 0–0.2)
BASOPHILS NFR BLD AUTO: 0.9 % (ref 0–1.5)
BILIRUB SERPL-MCNC: 0.5 MG/DL (ref 0–1.2)
BUN SERPL-MCNC: 21 MG/DL (ref 8–23)
BUN/CREAT SERPL: 15.7 (ref 7–25)
CALCIUM SPEC-SCNC: 9.9 MG/DL (ref 8.6–10.5)
CHLORIDE SERPL-SCNC: 105 MMOL/L (ref 98–107)
CO2 SERPL-SCNC: 26.3 MMOL/L (ref 22–29)
CREAT SERPL-MCNC: 1.34 MG/DL (ref 0.76–1.27)
CRP SERPL-MCNC: <0.3 MG/DL (ref 0–0.5)
DEPRECATED RDW RBC AUTO: 44.6 FL (ref 37–54)
EOSINOPHIL # BLD AUTO: 0.39 10*3/MM3 (ref 0–0.4)
EOSINOPHIL NFR BLD AUTO: 5.5 % (ref 0.3–6.2)
ERYTHROCYTE [DISTWIDTH] IN BLOOD BY AUTOMATED COUNT: 13.4 % (ref 12.3–15.4)
ERYTHROCYTE [SEDIMENTATION RATE] IN BLOOD: 8 MM/HR (ref 0–20)
GFR SERPL CREATININE-BSD FRML MDRD: 54 ML/MIN/1.73
GLOBULIN UR ELPH-MCNC: 3.4 GM/DL
GLUCOSE SERPL-MCNC: 103 MG/DL (ref 65–99)
HCT VFR BLD AUTO: 49.4 % (ref 37.5–51)
HGB BLD-MCNC: 16.9 G/DL (ref 13–17.7)
IMM GRANULOCYTES # BLD AUTO: 0.03 10*3/MM3 (ref 0–0.05)
IMM GRANULOCYTES NFR BLD AUTO: 0.4 % (ref 0–0.5)
LYMPHOCYTES # BLD AUTO: 1.79 10*3/MM3 (ref 0.7–3.1)
LYMPHOCYTES NFR BLD AUTO: 25.4 % (ref 19.6–45.3)
MCH RBC QN AUTO: 31.8 PG (ref 26.6–33)
MCHC RBC AUTO-ENTMCNC: 34.2 G/DL (ref 31.5–35.7)
MCV RBC AUTO: 93 FL (ref 79–97)
MONOCYTES # BLD AUTO: 0.58 10*3/MM3 (ref 0.1–0.9)
MONOCYTES NFR BLD AUTO: 8.2 % (ref 5–12)
NEUTROPHILS NFR BLD AUTO: 4.19 10*3/MM3 (ref 1.7–7)
NEUTROPHILS NFR BLD AUTO: 59.6 % (ref 42.7–76)
NRBC BLD AUTO-RTO: 0 /100 WBC (ref 0–0.2)
PLATELET # BLD AUTO: 192 10*3/MM3 (ref 140–450)
PMV BLD AUTO: 11.4 FL (ref 6–12)
POTASSIUM SERPL-SCNC: 4.5 MMOL/L (ref 3.5–5.2)
PROT SERPL-MCNC: 7.5 G/DL (ref 6–8.5)
RBC # BLD AUTO: 5.31 10*6/MM3 (ref 4.14–5.8)
SODIUM SERPL-SCNC: 139 MMOL/L (ref 136–145)
URATE SERPL-MCNC: 4.7 MG/DL (ref 3.4–7)
WBC # BLD AUTO: 7.04 10*3/MM3 (ref 3.4–10.8)

## 2021-11-05 PROCEDURE — 84550 ASSAY OF BLOOD/URIC ACID: CPT

## 2021-11-05 PROCEDURE — 82306 VITAMIN D 25 HYDROXY: CPT

## 2021-11-05 PROCEDURE — 36415 COLL VENOUS BLD VENIPUNCTURE: CPT

## 2021-11-05 PROCEDURE — 86140 C-REACTIVE PROTEIN: CPT

## 2021-11-05 PROCEDURE — 85025 COMPLETE CBC W/AUTO DIFF WBC: CPT

## 2021-11-05 PROCEDURE — 80053 COMPREHEN METABOLIC PANEL: CPT

## 2021-11-05 PROCEDURE — 85652 RBC SED RATE AUTOMATED: CPT

## 2021-12-07 ENCOUNTER — LAB (OUTPATIENT)
Dept: LAB | Facility: HOSPITAL | Age: 61
End: 2021-12-07

## 2021-12-07 DIAGNOSIS — E78.2 MIXED HYPERLIPIDEMIA: ICD-10-CM

## 2021-12-07 DIAGNOSIS — N18.31 STAGE 3A CHRONIC KIDNEY DISEASE (HCC): ICD-10-CM

## 2021-12-07 DIAGNOSIS — R74.8 ELEVATED LIVER ENZYMES: ICD-10-CM

## 2021-12-07 DIAGNOSIS — I50.9 CONGESTIVE HEART FAILURE, UNSPECIFIED HF CHRONICITY, UNSPECIFIED HEART FAILURE TYPE (HCC): ICD-10-CM

## 2021-12-07 DIAGNOSIS — I10 HYPERTENSION, ESSENTIAL: ICD-10-CM

## 2021-12-07 DIAGNOSIS — Z12.5 SCREENING PSA (PROSTATE SPECIFIC ANTIGEN): ICD-10-CM

## 2021-12-07 DIAGNOSIS — D47.2 MONOCLONAL GAMMOPATHY OF UNDETERMINED SIGNIFICANCE: ICD-10-CM

## 2021-12-07 DIAGNOSIS — K59.1 FUNCTIONAL DIARRHEA: ICD-10-CM

## 2021-12-07 DIAGNOSIS — M86.071 ACUTE HEMATOGENOUS OSTEOMYELITIS OF RIGHT FOOT (HCC): ICD-10-CM

## 2021-12-07 DIAGNOSIS — I48.0 PAROXYSMAL ATRIAL FIBRILLATION (HCC): ICD-10-CM

## 2021-12-07 DIAGNOSIS — D75.1 POLYCYTHEMIA: ICD-10-CM

## 2021-12-07 LAB
ALBUMIN SERPL-MCNC: 3.9 G/DL (ref 3.5–5.2)
ALBUMIN/GLOB SERPL: 1.3 G/DL
ALP SERPL-CCNC: 70 U/L (ref 39–117)
ALT SERPL W P-5'-P-CCNC: 21 U/L (ref 1–41)
ANION GAP SERPL CALCULATED.3IONS-SCNC: 10.7 MMOL/L (ref 5–15)
AST SERPL-CCNC: 20 U/L (ref 1–40)
BASOPHILS # BLD AUTO: 0.06 10*3/MM3 (ref 0–0.2)
BASOPHILS NFR BLD AUTO: 1 % (ref 0–1.5)
BILIRUB SERPL-MCNC: 0.3 MG/DL (ref 0–1.2)
BUN SERPL-MCNC: 21 MG/DL (ref 8–23)
BUN/CREAT SERPL: 21 (ref 7–25)
CALCIUM SPEC-SCNC: 9.2 MG/DL (ref 8.6–10.5)
CHLORIDE SERPL-SCNC: 108 MMOL/L (ref 98–107)
CHOLEST SERPL-MCNC: 120 MG/DL (ref 0–200)
CO2 SERPL-SCNC: 21.3 MMOL/L (ref 22–29)
CREAT SERPL-MCNC: 1 MG/DL (ref 0.76–1.27)
CRP SERPL-MCNC: <0.3 MG/DL (ref 0–0.5)
DEPRECATED RDW RBC AUTO: 43.6 FL (ref 37–54)
EOSINOPHIL # BLD AUTO: 0.36 10*3/MM3 (ref 0–0.4)
EOSINOPHIL NFR BLD AUTO: 5.9 % (ref 0.3–6.2)
ERYTHROCYTE [DISTWIDTH] IN BLOOD BY AUTOMATED COUNT: 13 % (ref 12.3–15.4)
ERYTHROCYTE [SEDIMENTATION RATE] IN BLOOD: 16 MM/HR (ref 0–20)
FOLATE SERPL-MCNC: 5.2 NG/ML (ref 4.78–24.2)
GFR SERPL CREATININE-BSD FRML MDRD: 76 ML/MIN/1.73
GLOBULIN UR ELPH-MCNC: 3.1 GM/DL
GLUCOSE SERPL-MCNC: 100 MG/DL (ref 65–99)
HCT VFR BLD AUTO: 48.9 % (ref 37.5–51)
HDLC SERPL-MCNC: 32 MG/DL (ref 40–60)
HGB BLD-MCNC: 16.9 G/DL (ref 13–17.7)
IMM GRANULOCYTES # BLD AUTO: 0.03 10*3/MM3 (ref 0–0.05)
IMM GRANULOCYTES NFR BLD AUTO: 0.5 % (ref 0–0.5)
LDLC SERPL CALC-MCNC: 61 MG/DL (ref 0–100)
LDLC/HDLC SERPL: 1.79 {RATIO}
LYMPHOCYTES # BLD AUTO: 1.42 10*3/MM3 (ref 0.7–3.1)
LYMPHOCYTES NFR BLD AUTO: 23.4 % (ref 19.6–45.3)
MCH RBC QN AUTO: 31.9 PG (ref 26.6–33)
MCHC RBC AUTO-ENTMCNC: 34.6 G/DL (ref 31.5–35.7)
MCV RBC AUTO: 92.4 FL (ref 79–97)
MONOCYTES # BLD AUTO: 0.52 10*3/MM3 (ref 0.1–0.9)
MONOCYTES NFR BLD AUTO: 8.6 % (ref 5–12)
NEUTROPHILS NFR BLD AUTO: 3.69 10*3/MM3 (ref 1.7–7)
NEUTROPHILS NFR BLD AUTO: 60.6 % (ref 42.7–76)
NRBC BLD AUTO-RTO: 0 /100 WBC (ref 0–0.2)
PLATELET # BLD AUTO: 162 10*3/MM3 (ref 140–450)
PMV BLD AUTO: 11.5 FL (ref 6–12)
POTASSIUM SERPL-SCNC: 4.2 MMOL/L (ref 3.5–5.2)
PROT SERPL-MCNC: 7 G/DL (ref 6–8.5)
PSA SERPL-MCNC: 1.09 NG/ML (ref 0–4)
RBC # BLD AUTO: 5.29 10*6/MM3 (ref 4.14–5.8)
SODIUM SERPL-SCNC: 140 MMOL/L (ref 136–145)
T4 FREE SERPL-MCNC: 1.09 NG/DL (ref 0.93–1.7)
TRIGL SERPL-MCNC: 154 MG/DL (ref 0–150)
TSH SERPL DL<=0.05 MIU/L-ACNC: 1.75 UIU/ML (ref 0.27–4.2)
URATE SERPL-MCNC: 3.9 MG/DL (ref 3.4–7)
VIT B12 BLD-MCNC: 359 PG/ML (ref 211–946)
VLDLC SERPL-MCNC: 27 MG/DL (ref 5–40)
WBC NRBC COR # BLD: 6.08 10*3/MM3 (ref 3.4–10.8)

## 2021-12-07 PROCEDURE — 84439 ASSAY OF FREE THYROXINE: CPT

## 2021-12-07 PROCEDURE — 82607 VITAMIN B-12: CPT

## 2021-12-07 PROCEDURE — G0103 PSA SCREENING: HCPCS

## 2021-12-07 PROCEDURE — 82746 ASSAY OF FOLIC ACID SERUM: CPT

## 2021-12-07 PROCEDURE — 85025 COMPLETE CBC W/AUTO DIFF WBC: CPT

## 2021-12-07 PROCEDURE — 86140 C-REACTIVE PROTEIN: CPT

## 2021-12-07 PROCEDURE — 36415 COLL VENOUS BLD VENIPUNCTURE: CPT

## 2021-12-07 PROCEDURE — 80053 COMPREHEN METABOLIC PANEL: CPT

## 2021-12-07 PROCEDURE — 84443 ASSAY THYROID STIM HORMONE: CPT

## 2021-12-07 PROCEDURE — 84550 ASSAY OF BLOOD/URIC ACID: CPT

## 2021-12-07 PROCEDURE — 85652 RBC SED RATE AUTOMATED: CPT

## 2021-12-07 PROCEDURE — 80061 LIPID PANEL: CPT

## 2021-12-08 NOTE — PROGRESS NOTES
"Answers for HPI/ROS submitted by the patient on 12/7/2021  What is the primary reason for your visit?: Other  Please describe your symptoms.: follow up  Have you had these symptoms before?: No  How long have you been having these symptoms?: Greater than 2 weeks  Please list any medications you are currently taking for this condition.: none  Please describe any probable cause for these symptoms. : follow up    Chief Complaint/ HPI:   Follow-up with right foot infection previously treated with IV antibiotics vancomycin for 6 weeks for cellulitis osteomyelitis, improved, does remember possibly injuring the area with a nail at golf [employer], currently doing well, blood pressures have been doing well, no chest pains,        Objective   Vital Signs  Vitals:    12/09/21 1450   BP: 126/76   Pulse: 82   Temp: 98.1 °F (36.7 °C)   SpO2: 95%   Weight: 106 kg (232 lb 9.6 oz)   Height: 170.2 cm (67.01\")      Body mass index is 36.42 kg/m².  Review of Systems   Constitutional: Negative.    HENT: Negative.    Eyes: Negative.    Respiratory: Negative.    Cardiovascular: Negative.    Gastrointestinal: Negative.    Endocrine: Negative.    Genitourinary: Negative.    Musculoskeletal: Negative.    Allergic/Immunologic: Negative.    Neurological: Negative.    Hematological: Negative.    Psychiatric/Behavioral: Negative.       Physical Exam  Constitutional:       General: He is not in acute distress.     Appearance: Normal appearance.   HENT:      Head: Normocephalic.      Mouth/Throat:      Mouth: Mucous membranes are moist.   Eyes:      Conjunctiva/sclera: Conjunctivae normal.      Pupils: Pupils are equal, round, and reactive to light.   Cardiovascular:      Rate and Rhythm: Normal rate and regular rhythm.      Pulses: Normal pulses.      Heart sounds: Normal heart sounds.   Pulmonary:      Effort: Pulmonary effort is normal.      Breath sounds: Normal breath sounds.   Abdominal:      General: Abdomen is flat. Bowel sounds are normal.      " Palpations: Abdomen is soft.   Musculoskeletal:         General: No swelling. Normal range of motion.      Cervical back: Neck supple.   Skin:     General: Skin is warm and dry.      Coloration: Skin is not jaundiced.   Neurological:      General: No focal deficit present.      Mental Status: He is alert and oriented to person, place, and time. Mental status is at baseline.   Psychiatric:         Mood and Affect: Mood normal.         Behavior: Behavior normal.         Thought Content: Thought content normal.         Judgment: Judgment normal.     2+ PT pulses bilateral patient still has some swelling 1+ around the ankle and lower PreTAB region of the right leg, there is still some warmth noted to the medial and lateral ankle on the right, patient is ambulatory,  Result Review :   No results found for: PROBNP, BNP  CMP    CMP 10/11/21 11/5/21 12/7/21   Glucose 115 (A) 103 (A) 100 (A)   BUN 16 21 21   Creatinine 1.07 1.34 (A) 1.00   eGFR Non  Am 70 54 (A) 76   Sodium 140 139 140   Potassium 3.9 4.5 4.2   Chloride 106 105 108 (A)   Calcium 9.2 9.9 9.2   Albumin 4.20 4.10 3.90   Total Bilirubin 0.4 0.5 0.3   Alkaline Phosphatase 57 69 70   AST (SGOT) 22 48 (A) 20   ALT (SGPT) 29 31 21   (A) Abnormal value            CBC w/diff    CBC w/Diff 10/11/21 11/5/21 12/7/21   WBC 7.31 7.04 6.08   RBC 4.78 5.31 5.29   Hemoglobin 15.0 16.9 16.9   Hematocrit 45.9 49.4 48.9   MCV 96.0 93.0 92.4   MCH 31.4 31.8 31.9   MCHC 32.7 34.2 34.6   RDW 13.9 13.4 13.0   Platelets 181 192 162   Neutrophil Rel % 66.5 59.6 60.6   Immature Granulocyte Rel % 0.4 0.4 0.5   Lymphocyte Rel % 18.3 (A) 25.4 23.4   Monocyte Rel % 8.1 8.2 8.6   Eosinophil Rel % 6.2 5.5 5.9   Basophil Rel % 0.5 0.9 1.0   (A) Abnormal value             Lipid Panel    Lipid Panel 3/12/21 9/13/21 12/7/21   Total Cholesterol  156 120   Total Cholesterol 122     Triglycerides 70 81 154 (A)   HDL Cholesterol 36 (A) 33 (A) 32 (A)   VLDL Cholesterol 14 16 27   LDL  Cholesterol  72 107 (A) 61   LDL/HDL Ratio  3.24 1.79   (A) Abnormal value       Comments are available for some flowsheets but are not being displayed.            Lab Results   Component Value Date    TSH 1.750 12/07/2021    TSH 2.780 09/13/2021    TSH 2.070 08/02/2019      Lab Results   Component Value Date    FREET4 1.09 12/07/2021    FREET4 0.9 05/04/2019         PSA    PSA 9/13/21 12/7/21   PSA 1.300 1.090                          Visit Diagnoses:    ICD-10-CM ICD-9-CM   1. Stenosis of carotid artery, unspecified laterality  I65.29 433.10   2. Paroxysmal atrial fibrillation (HCC)  I48.0 427.31   3. Hypertension, essential  I10 401.9   4. Mixed hyperlipidemia  E78.2 272.2   5. Elevated liver enzymes  R74.8 790.5   6. Monoclonal gammopathy of undetermined significance  D47.2 273.1   7. Polycythemia  D75.1 238.4   8. Stage 3a chronic kidney disease (HCC)  N18.31 585.3   9. Acute right ankle pain  M25.571 719.47     338.19       Assessment and Plan   Diagnoses and all orders for this visit:    1. Stenosis of carotid artery, unspecified laterality (Primary)    2. Paroxysmal atrial fibrillation (HCC)    3. Hypertension, essential    4. Mixed hyperlipidemia    5. Elevated liver enzymes    6. Monoclonal gammopathy of undetermined significance    7. Polycythemia    8. Stage 3a chronic kidney disease (HCC)    9. Acute right ankle pain        Osteomyelitis, right ankle, has finished up course of antibiotics treatment options including continue doxycycline for 6 months versus following sed rates retreating if sed rates increase, discussed with patient December 9, 2021 --------------------patient had surgery for I&D and bone biopsy sept 1, 2021--- showing MRSA and gout into the right ankle and foot on September 1, 2021 by Dr. Turner at [facility] --- through [facility], had a PICC line placed, was put on IV antibiotics by infectious disease doctor and is seen, has been on vancomycin every 12 hours at home, for  total of 6 weeks----    Right ankle pain swelling ongoing for 5 weeks, issue is not resolving with several different rounds of medications including 2 rounds of steroids anti-inflammatories, as of August 2021, patient was and made appointment with orthopedic in [place] as above, had surgery September 1 biopsy is growing out MRSA and osteomyelitis now has PICC line with IV vancomycin for 6 weeks, discussed potential treatment options depending on progress over the next several weeks to include oral doxycycline if necessary for continued treatment, discussed with patient September 23, 2021    LAP CHOLEY, AND POST OP BILE LEAK/ CBD OBSTRUCTION, READMITTED,AND HAD ERCP WITH DR PICKARD--- DEVELOPED AFIB WITH RVR, SEPTIC , ELEVATED WBC, APRIL 2019, CARDIOVERTED, WAS RX WITH ABX, HAD MARIAN DRAIN PLACED BY RADIOLOGY-- AND THEN ANOTHER MARIAN DRAIN PLACED, --THORACENTISIS ALSO DONE TIMES 2, --HAD STENT REMOVED JUNE 2019, HAD STRESS TEST WITH KIRKLAND,, May 2019, no evidence of ischemia and normal myocardial SPECT perfusion study,-------CARDIAC EVENT MONITOR 8/2019, --NO AFIB ------- ON ELIQUIS -- AUG 2019, , Stopped eliquis October 2019    VASC CALCIFICATIONS SEEN ON CAROTIDS ---ON PLAIN XRAY PER ISAAC FOR BONE SURVERY ---WILL DO CAROTID U/S SOON , MARCH 2021, recoding  as carotid arterial disease, December 9, 2021 and will try to appeal denial on the carotid ultrasound,    --DUMPING SYNDROME , RX COLESTID ---SEPT 2020, ---resolved with keto diet and weight loss, currently not taking Colestid, March 2021,    POLYCYTHEMIA, ----- recommend phlebotomy 500 cc of blood due to hematocrit , Discussed March 2021, patient will consider and we can set up for phlebotomy hematocrit 52, March 13, 2021,, stable hemoglobin 16.9 December 2021    Elevated creatinine, baseline 1.2 previous 3 years, currently 1.3 April 2018, Slightly elevated again 1.4 patient will drink more fluids, September 2020---marked improvement with weight loss, keto diet,  creatinine 1. 1 March 2021,, improved at 1.0 December 2021    HTN--cont ATACAND 16 MG QD, METOPROLOL 50 QOD AND 75 QOD ,     ELEVATED CHOL.--cont CRESTOR 10 QHS--Marked improvement with diet and weight loss March 2021    ELEVATED LFTS, KEENAN--NORMAL NOW DEC 2016, , April 2018, ultrasounds liver reviewed, October 2017 and March 2017, shows improvement in liver size,---Has had ultrasounds and fibrosis scan October 2019, slightly elevated score of 0.39 stage F1 to F2, moderate steatosis, has been followed by GI service, current liver enzymes are normal, September 2020---current ultrasound March 5, 2021 shows normal echogenicity of the liver texture, no liver lesions, no biliary ductal dilation, otherwise unremarkable    MONOCLONAL PARAPROTEINURIA, DX IN 2015-16, NOW GOING TO [place] FOR F/U Total proteins have been stable over the last several years through [facility], in follow-up, no treatment at this time,===    OA, OF L HIP AND NICOLAS 12/13, KAREN BURLESONWGT--GOOD  LVH ON EKG AND ECHO 2016  LAST CSCOPE 9/13 AND EGD , AND AUG 2017, NEERAJ   PSA =1.1 , APRIL 2018,, PSA 1.34 September 2020          Follow Up   No follow-ups on file.  Patient was given instructions and counseling regarding his condition or for health maintenance advice. Please see specific information pulled into the AVS if appropriate.

## 2021-12-09 ENCOUNTER — OFFICE VISIT (OUTPATIENT)
Dept: INTERNAL MEDICINE | Facility: CLINIC | Age: 61
End: 2021-12-09

## 2021-12-09 VITALS
DIASTOLIC BLOOD PRESSURE: 76 MMHG | TEMPERATURE: 98.1 F | BODY MASS INDEX: 36.51 KG/M2 | HEIGHT: 67 IN | SYSTOLIC BLOOD PRESSURE: 126 MMHG | WEIGHT: 232.6 LBS | HEART RATE: 82 BPM | OXYGEN SATURATION: 95 %

## 2021-12-09 DIAGNOSIS — R74.8 ELEVATED LIVER ENZYMES: ICD-10-CM

## 2021-12-09 DIAGNOSIS — D47.2 MONOCLONAL GAMMOPATHY OF UNDETERMINED SIGNIFICANCE: ICD-10-CM

## 2021-12-09 DIAGNOSIS — I48.0 PAROXYSMAL ATRIAL FIBRILLATION (HCC): ICD-10-CM

## 2021-12-09 DIAGNOSIS — E78.2 MIXED HYPERLIPIDEMIA: ICD-10-CM

## 2021-12-09 DIAGNOSIS — D75.1 POLYCYTHEMIA: ICD-10-CM

## 2021-12-09 DIAGNOSIS — M25.571 ACUTE RIGHT ANKLE PAIN: ICD-10-CM

## 2021-12-09 DIAGNOSIS — I65.29 STENOSIS OF CAROTID ARTERY, UNSPECIFIED LATERALITY: Primary | ICD-10-CM

## 2021-12-09 DIAGNOSIS — I10 HYPERTENSION, ESSENTIAL: ICD-10-CM

## 2021-12-09 DIAGNOSIS — N18.31 STAGE 3A CHRONIC KIDNEY DISEASE (HCC): ICD-10-CM

## 2021-12-09 DIAGNOSIS — M86.9 OSTEOMYELITIS OF ANKLE AND FOOT (HCC): ICD-10-CM

## 2021-12-09 PROCEDURE — 99213 OFFICE O/P EST LOW 20 MIN: CPT | Performed by: INTERNAL MEDICINE

## 2021-12-09 RX ORDER — COLESTIPOL HYDROCHLORIDE 5 G/5G
1 GRANULE, FOR SUSPENSION ORAL
COMMUNITY

## 2021-12-13 ENCOUNTER — OFFICE VISIT (OUTPATIENT)
Dept: CARDIOLOGY | Facility: CLINIC | Age: 61
End: 2021-12-13

## 2021-12-13 VITALS
BODY MASS INDEX: 36.57 KG/M2 | SYSTOLIC BLOOD PRESSURE: 140 MMHG | WEIGHT: 233 LBS | HEART RATE: 74 BPM | DIASTOLIC BLOOD PRESSURE: 78 MMHG | HEIGHT: 67 IN

## 2021-12-13 DIAGNOSIS — I48.0 PAROXYSMAL ATRIAL FIBRILLATION (HCC): Primary | ICD-10-CM

## 2021-12-13 DIAGNOSIS — N18.31 STAGE 3A CHRONIC KIDNEY DISEASE (HCC): ICD-10-CM

## 2021-12-13 DIAGNOSIS — E78.2 MIXED HYPERLIPIDEMIA: ICD-10-CM

## 2021-12-13 DIAGNOSIS — I10 HYPERTENSION, ESSENTIAL: ICD-10-CM

## 2021-12-13 DIAGNOSIS — I50.9 CONGESTIVE HEART FAILURE, UNSPECIFIED HF CHRONICITY, UNSPECIFIED HEART FAILURE TYPE (HCC): ICD-10-CM

## 2021-12-13 PROCEDURE — 99214 OFFICE O/P EST MOD 30 MIN: CPT | Performed by: INTERNAL MEDICINE

## 2021-12-13 PROCEDURE — 93000 ELECTROCARDIOGRAM COMPLETE: CPT | Performed by: INTERNAL MEDICINE

## 2021-12-13 RX ORDER — ROSUVASTATIN CALCIUM 10 MG/1
10 TABLET, COATED ORAL DAILY
Qty: 90 TABLET | Refills: 3 | Status: SHIPPED | OUTPATIENT
Start: 2021-12-13 | End: 2022-06-15 | Stop reason: SDUPTHER

## 2021-12-13 RX ORDER — METOPROLOL SUCCINATE 50 MG/1
75 TABLET, EXTENDED RELEASE ORAL DAILY
Qty: 135 TABLET | Refills: 3 | Status: SHIPPED | OUTPATIENT
Start: 2021-12-13 | End: 2023-02-15 | Stop reason: SDUPTHER

## 2021-12-13 RX ORDER — HYDROCHLOROTHIAZIDE 12.5 MG/1
12.5 CAPSULE, GELATIN COATED ORAL DAILY
Qty: 90 CAPSULE | Refills: 3 | Status: SHIPPED | OUTPATIENT
Start: 2021-12-13 | End: 2021-12-13

## 2021-12-13 RX ORDER — CANDESARTAN 16 MG/1
16 TABLET ORAL DAILY
Qty: 90 TABLET | Refills: 3 | Status: SHIPPED | OUTPATIENT
Start: 2021-12-13 | End: 2022-06-15 | Stop reason: DRUGHIGH

## 2021-12-13 NOTE — PROGRESS NOTES
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  He had a long prolonged course with osteomyelitis in his foot which is now recovering.  He was hospitalized and subsequently had to stay home for 6 weeks with IV antibiotics.  His medications were changed because of his illness and also development of gout which necessitated discontinuation the hydrochlorothiazide.  His blood pressures have been very well regulated at home.  He checks them frequently.      Past Medical History:   Diagnosis Date   • A-fib (HCC)    • Acid reflux    • Arthritis    • Colon polyps    • Essential hypertension    • Family history of colon cancer    • Fatty liver    • GERD (gastroesophageal reflux disease)    • Heart disease    • High blood pressure    • Hip pain     LEFT    • Hyperlipemia    • Hypertension    • MGUS (monoclonal gammopathy of unknown significance)    • Multiple myeloma (HCC)    • Pes cavus of left foot 01/12/2019   • Primary osteoarthritis of left foot 01/12/2019   • RUQ pain    • Sciatic nerve pain        No Known Allergies     Past Surgical History:   Procedure Laterality Date   • ANKLE SURGERY Right 1977    RIGHT ANKLE CLOSED REDUCTION    • CARDIOVERSION     • CHOLECYSTECTOMY      LAPAROSCOPIC   • COLONOSCOPY  04/21/2017    DR. PICKARD   • ENDOSCOPY  08/09/2013       • HIP ARTHROPLASTY Left    • HIP BIPOLAR REPLACEMENT Left    • OTHER SURGICAL HISTORY      ARTIFICAL JOINTS/LIMBS    • OTHER SURGICAL HISTORY      JOINT SURGERY   • OTHER SURGICAL HISTORY      I & D for osteomylitis right foot        Social History     Tobacco Use   • Smoking status: Never Smoker   • Smokeless tobacco: Never Used   Vaping Use   • Vaping  Use: Never used   Substance Use Topics   • Alcohol use: Yes     Comment: occasionally   • Drug use: Never       Family History   Problem Relation Age of Onset   • Stroke Mother    • Arthritis Mother    • Prostate cancer Father 69   • Heart disease Father    • Colon cancer Paternal Grandmother 60   • Colon cancer Paternal Aunt    • Colon cancer Paternal Uncle         Prior to Admission medications    Medication Sig Start Date End Date Taking? Authorizing Provider   allopurinol (Zyloprim) 300 MG tablet Take 1 tablet by mouth Daily. 8/18/21  Yes Stanford Howe MD   aspirin (aspirin) 81 MG EC tablet Aspir-81 81 mg oral tablet,delayed release (DR/EC) take 1 tablet (81 mg) by oral route once daily   Active   Yes Emergency, Nurse Epic, RN   colestipol (COLESTID) 5 g granules Take 1 g by mouth.   Yes ProviderMelissa MD   hydroCHLOROthiazide (MICROZIDE) 12.5 MG capsule Take 12.5 mg by mouth Daily.   Yes Emergency, Nurse ABELINO Smith   Loratadine 10 MG capsule As Needed.   Yes Emergency, Nurse ABELINO Smith   metoprolol succinate XL (Toprol XL) 50 MG 24 hr tablet 75 mg. Pt takes 50mg  One day then 75Mg the next   Yes Emergency, Nurse ABELINO Smith   metoprolol succinate XL (TOPROL-XL) 25 MG 24 hr tablet Take 75 mg by mouth Every Other Day. 8/6/21  Yes Melissa Bonilla MD   pantoprazole (Protonix) 40 MG EC tablet Take 1 tablet by mouth Daily. 8/18/21  Yes Stanford Howe MD   rosuvastatin (CRESTOR) 10 MG tablet Take 10 mg by mouth Daily.   Yes Emergency, Nurse ABELINO Smith   Turmeric 1053 MG tablet turmeric 400 mg oral capsule take 1 capsule by oral route daily   Active   Yes Emergency, Nurse Luis RN   candesartan (ATACAND) 16 MG tablet Take 16 mg by mouth Daily.    Emergency, Nurse Epic, RN   colchicine 0.6 MG tablet TAKE 2 TABLETS BY MOUTH NOW THEN REPEAT 1 TABLET IN ONE HOUR 7/29/21   ProviderMelissa MD   colchicine 0.6 MG tablet Take 1 tablet by mouth 2 (Two) Times a Day. 8/18/21   Stanford Howe  "MD   gabapentin (NEURONTIN) 100 MG capsule Take 1 capsule by mouth 3 (Three) Times a Day. 8/18/21   Stanford Howe MD   HYDROcodone-acetaminophen (NORCO)  MG per tablet  9/3/21   Melissa Bonilla MD   HYDROcodone-acetaminophen (NORCO)  MG per tablet Take 1 tablet by mouth. 9/3/21   Melissa Bonilla MD   indomethacin (INDOCIN) 50 MG capsule  8/17/21   Melissa Bonilla MD   MISC NATURAL PRODUCTS OT Take 1 tablet by mouth Daily.    Melissa Bonilla MD   VANCOMYCIN 1750 MG  ML D5W IVPB, 10 GRAM,, CMPD, BID through IV pic line    ProviderMelissa MD        Review of Systems   Constitutional: Positive for fatigue.   Respiratory: Negative for cough and shortness of breath.    Cardiovascular: Positive for leg swelling. Negative for chest pain and palpitations.   Neurological: Negative for dizziness.        Objective     /78   Pulse 74   Ht 170.2 cm (67\")   Wt 106 kg (233 lb)   BMI 36.49 kg/m²       Physical Exam  Constitutional:       General: He is awake.      Appearance: Normal appearance.   Neck:      Thyroid: No thyromegaly.      Vascular: No carotid bruit or JVD.   Cardiovascular:      Rate and Rhythm: Normal rate and regular rhythm.      Chest Wall: PMI is not displaced.      Pulses: Normal pulses.      Heart sounds: Normal heart sounds, S1 normal and S2 normal. No murmur heard.  No friction rub. No gallop. No S3 or S4 sounds.    Pulmonary:      Effort: Pulmonary effort is normal.      Breath sounds: Normal breath sounds and air entry. No wheezing, rhonchi or rales.   Abdominal:      General: Bowel sounds are normal.      Palpations: Abdomen is soft. There is no mass.      Tenderness: There is no abdominal tenderness.   Musculoskeletal:      Cervical back: Neck supple.   Neurological:      Mental Status: He is alert and oriented to person, place, and time.   Psychiatric:         Mood and Affect: Mood normal.         Behavior: Behavior is cooperative. "         No results found for: PROBNP, BNP  CMP    CMP 10/11/21 11/5/21 12/7/21   Glucose 115 (A) 103 (A) 100 (A)   BUN 16 21 21   Creatinine 1.07 1.34 (A) 1.00   eGFR Non  Am 70 54 (A) 76   Sodium 140 139 140   Potassium 3.9 4.5 4.2   Chloride 106 105 108 (A)   Calcium 9.2 9.9 9.2   Albumin 4.20 4.10 3.90   Total Bilirubin 0.4 0.5 0.3   Alkaline Phosphatase 57 69 70   AST (SGOT) 22 48 (A) 20   ALT (SGPT) 29 31 21   (A) Abnormal value            CBC w/diff    CBC w/Diff 10/11/21 11/5/21 12/7/21   WBC 7.31 7.04 6.08   RBC 4.78 5.31 5.29   Hemoglobin 15.0 16.9 16.9   Hematocrit 45.9 49.4 48.9   MCV 96.0 93.0 92.4   MCH 31.4 31.8 31.9   MCHC 32.7 34.2 34.6   RDW 13.9 13.4 13.0   Platelets 181 192 162   Neutrophil Rel % 66.5 59.6 60.6   Immature Granulocyte Rel % 0.4 0.4 0.5   Lymphocyte Rel % 18.3 (A) 25.4 23.4   Monocyte Rel % 8.1 8.2 8.6   Eosinophil Rel % 6.2 5.5 5.9   Basophil Rel % 0.5 0.9 1.0   (A) Abnormal value             Lipid Panel    Lipid Panel 3/12/21 9/13/21 12/7/21   Total Cholesterol  156 120   Total Cholesterol 122     Triglycerides 70 81 154 (A)   HDL Cholesterol 36 (A) 33 (A) 32 (A)   VLDL Cholesterol 14 16 27   LDL Cholesterol  72 107 (A) 61   LDL/HDL Ratio  3.24 1.79   (A) Abnormal value       Comments are available for some flowsheets but are not being displayed.            Lab Results   Component Value Date    TSH 1.750 12/07/2021    TSH 2.780 09/13/2021    TSH 2.070 08/02/2019      Lab Results   Component Value Date    FREET4 1.09 12/07/2021    FREET4 0.9 05/04/2019      No results found for: DDIMERQUANT  Magnesium   Date Value Ref Range Status   05/11/2019 2.08 1.60 - 2.30 mg/dL Final      No results found for: DIGOXIN            Result Review :                    ECG 12 Lead    Date/Time: 12/13/2021 2:21 PM  Performed by: Jameson La MD  Authorized by: Jameson La MD   Comments: Sinus rhythm left atrial abnormality borderline intraventricular conduction delay with nonspecific T  abnormalities lateral lateral leads.  No significant change compared to previous EKG                Assessment and Plan        Diagnoses and all orders for this visit:    1. Hypertension, essential (Primary)  Assessment & Plan:  Hypertension is reasonably controlled.  I would like to see his systolic stay consistently below 130.  I am going to increase his Toprol to 75 mg at bedtime since he has been taken off the HCTZ due to development of gout.  He will maintain a 2-week blood pressure log and bring it to us.  I have encouraged him to continue to follow a low-cholesterol diet and start a gradual aerobic exercise program    Orders:  -     Lipid Panel; Future  -     Comprehensive Metabolic Panel; Future  -     Magnesium; Future    2. Paroxysmal atrial fibrillation (HCC)  -     Lipid Panel; Future  -     Comprehensive Metabolic Panel; Future  -     Magnesium; Future    3. Mixed hyperlipidemia  Assessment & Plan:  Lipid abnormalities are at goal.  He will continue the Crestor and 10 mg once a day and follow a low-cholesterol diet.    Orders:  -     Lipid Panel; Future  -     Comprehensive Metabolic Panel; Future  -     Magnesium; Future    4. Congestive heart failure, unspecified HF chronicity, unspecified heart failure type (HCC)  -     Lipid Panel; Future  -     Comprehensive Metabolic Panel; Future  -     Magnesium; Future    5. Stage 3a chronic kidney disease (HCC)  -     Lipid Panel; Future  -     Comprehensive Metabolic Panel; Future  -     Magnesium; Future    Other orders  -     rosuvastatin (CRESTOR) 10 MG tablet; Take 1 tablet by mouth Daily.  Dispense: 90 tablet; Refill: 3  -     metoprolol succinate XL (Toprol XL) 50 MG 24 hr tablet; Take 1.5 tablets by mouth Daily. Take  1 1/2 tablets by mouth every night  Dispense: 135 tablet; Refill: 3  -     Discontinue: hydroCHLOROthiazide (MICROZIDE) 12.5 MG capsule; Take 1 capsule by mouth Daily.  Dispense: 90 capsule; Refill: 3  -     candesartan (ATACAND) 16 MG  tablet; Take 1 tablet by mouth Daily.  Dispense: 90 tablet; Refill: 3          Follow Up     Return in about 6 months (around 6/13/2022) for next ov with June.    Patient was given instructions and counseling regarding his condition or for health maintenance advice. Please see specific information pulled into the AVS if appropriate.     Jameson La MD  12/13/21 13:05 EST

## 2021-12-13 NOTE — ASSESSMENT & PLAN NOTE
Hypertension is reasonably controlled.  I would like to see his systolic stay consistently below 130.  I am going to increase his Toprol to 75 mg at bedtime since he has been taken off the HCTZ due to development of gout.  He will maintain a 2-week blood pressure log and bring it to us.  I have encouraged him to continue to follow a low-cholesterol diet and start a gradual aerobic exercise program

## 2021-12-13 NOTE — ASSESSMENT & PLAN NOTE
Lipid abnormalities are at goal.  He will continue the Crestor and 10 mg once a day and follow a low-cholesterol diet.

## 2021-12-17 ENCOUNTER — OFFICE VISIT (OUTPATIENT)
Dept: ORTHOPEDIC SURGERY | Facility: CLINIC | Age: 61
End: 2021-12-17

## 2021-12-17 VITALS — WEIGHT: 233 LBS | BODY MASS INDEX: 36.57 KG/M2 | HEIGHT: 67 IN

## 2021-12-17 DIAGNOSIS — Z96.642 HISTORY OF TOTAL LEFT HIP REPLACEMENT: Primary | ICD-10-CM

## 2021-12-17 PROCEDURE — 99213 OFFICE O/P EST LOW 20 MIN: CPT | Performed by: ORTHOPAEDIC SURGERY

## 2021-12-17 NOTE — PROGRESS NOTES
"Chief Complaint  Follow-up of the Left Hip     Subjective      Paolo Goldstein presents to [facility] for a follow-up of left hip. Patient had an issue with osteomyelitis of the right foot and had to have a I&D, PICC line and antibiotics. He wants peace of mind that his left total hip is doing okay. He denies fevers and chills. He denies pain in the hip. Patient has a history of left total hip.     No Known Allergies     Social History     Socioeconomic History   • Marital status:    Tobacco Use   • Smoking status: Never Smoker   • Smokeless tobacco: Never Used   Vaping Use   • Vaping Use: Never used   Substance and Sexual Activity   • Alcohol use: Yes     Comment: occasionally   • Drug use: Never   • Sexual activity: Yes     Partners: Female     Birth control/protection: None        Review of Systems     Objective   Vital Signs:   Ht 170.2 cm (67\")   Wt 106 kg (233 lb)   BMI 36.49 kg/m²       Physical Exam  Constitutional:       Appearance: Normal appearance. Patient is well-developed and normal weight.   HENT:      Head: Normocephalic.      Right Ear: Hearing and external ear normal.      Left Ear: Hearing and external ear normal.      Nose: Nose normal.   Eyes:      Conjunctiva/sclera: Conjunctivae normal.   Cardiovascular:      Rate and Rhythm: Normal rate.   Pulmonary:      Effort: Pulmonary effort is normal.      Breath sounds: No wheezing or rales.   Abdominal:      Palpations: Abdomen is soft.      Tenderness: There is no abdominal tenderness.   Musculoskeletal:      Cervical back: Normal range of motion.   Skin:     Findings: No rash.   Neurological:      Mental Status: Patient is alert and oriented to person, place, and time.   Psychiatric:         Mood and Affect: Mood and affect normal.         Judgment: Judgment normal.       Ortho Exam      LEFT HIP: Good tone of hip flexors, hip extensors, hip adductor, hip abductors. Non-antalgic gait. Non-tender hip and " pelvic muscles. Well healed scars. No swelling, skin discoloration or atrophy. Good passive hip range of motion. Dorsal Pedal Pulse 2+, posterior tibialis pulse 2+.       Procedures      Imaging Results (Most Recent)     Procedure Component Value Units Date/Time    XR Hip With or Without Pelvis 2 - 3 View Left [[id number]] Resulted: 12/17/21 1038     Updated: 12/17/21 1039    Narrative:      X-Ray Report:  Left hip(s) X-Ray  Indication: Evaluation of left hip pain   AP and Lateral view(s)  Findings: Demonstrates an intact left total hip arthroplasty with no   evidence of loosening or wearing.   Prior studies available for comparison: yes            Result Review :     X-Ray Report:  Left hip(s) X-Ray  Indication: Evaluation of left hip pain   AP and Lateral view(s)  Findings: Demonstrates an intact left total hip arthroplasty with no evidence of loosening or wearing.   Prior studies available for comparison: yes     Assessment and Plan     DX: Aftercare following left total hip arthroplasty     His left total hip is doing well. Follow-up in 1 year, repeat films then.     Call or return if worsening symptoms.    Follow Up     PRN.       Patient was given instructions and counseling regarding his condition or for health maintenance advice. Please see specific information pulled into the AVS if appropriate.     Scribed for Cassius Reilly MD by Elaine June.  12/17/21   08:59 EST        I have personally performed the services described in this document as scribed by the above individual and it is both accurate and complete. Cassius Reilly MD 12/18/21

## 2022-01-03 ENCOUNTER — LAB (OUTPATIENT)
Dept: LAB | Facility: HOSPITAL | Age: 62
End: 2022-01-03

## 2022-01-03 DIAGNOSIS — N18.31 STAGE 3A CHRONIC KIDNEY DISEASE: ICD-10-CM

## 2022-01-03 DIAGNOSIS — M86.071 ACUTE HEMATOGENOUS OSTEOMYELITIS OF RIGHT FOOT: ICD-10-CM

## 2022-01-03 DIAGNOSIS — I10 HYPERTENSION, ESSENTIAL: ICD-10-CM

## 2022-01-03 LAB
ALBUMIN SERPL-MCNC: 4 G/DL (ref 3.5–5.2)
ALBUMIN/GLOB SERPL: 1.3 G/DL
ALP SERPL-CCNC: 75 U/L (ref 39–117)
ALT SERPL W P-5'-P-CCNC: 27 U/L (ref 1–41)
ANION GAP SERPL CALCULATED.3IONS-SCNC: 7.6 MMOL/L (ref 5–15)
AST SERPL-CCNC: 18 U/L (ref 1–40)
BASOPHILS # BLD AUTO: 0.04 10*3/MM3 (ref 0–0.2)
BASOPHILS NFR BLD AUTO: 0.5 % (ref 0–1.5)
BILIRUB SERPL-MCNC: 0.4 MG/DL (ref 0–1.2)
BUN SERPL-MCNC: 20 MG/DL (ref 8–23)
BUN/CREAT SERPL: 15.7 (ref 7–25)
CALCIUM SPEC-SCNC: 9.5 MG/DL (ref 8.6–10.5)
CHLORIDE SERPL-SCNC: 107 MMOL/L (ref 98–107)
CO2 SERPL-SCNC: 23.4 MMOL/L (ref 22–29)
CREAT SERPL-MCNC: 1.27 MG/DL (ref 0.76–1.27)
CRP SERPL-MCNC: <0.3 MG/DL (ref 0–0.5)
DEPRECATED RDW RBC AUTO: 41.9 FL (ref 37–54)
EOSINOPHIL # BLD AUTO: 0.28 10*3/MM3 (ref 0–0.4)
EOSINOPHIL NFR BLD AUTO: 3.7 % (ref 0.3–6.2)
ERYTHROCYTE [DISTWIDTH] IN BLOOD BY AUTOMATED COUNT: 12.6 % (ref 12.3–15.4)
ERYTHROCYTE [SEDIMENTATION RATE] IN BLOOD: 16 MM/HR (ref 0–20)
GFR SERPL CREATININE-BSD FRML MDRD: 58 ML/MIN/1.73
GLOBULIN UR ELPH-MCNC: 3.1 GM/DL
GLUCOSE SERPL-MCNC: 94 MG/DL (ref 65–99)
HCT VFR BLD AUTO: 48.2 % (ref 37.5–51)
HGB BLD-MCNC: 16.7 G/DL (ref 13–17.7)
IMM GRANULOCYTES # BLD AUTO: 0.03 10*3/MM3 (ref 0–0.05)
IMM GRANULOCYTES NFR BLD AUTO: 0.4 % (ref 0–0.5)
LYMPHOCYTES # BLD AUTO: 1.38 10*3/MM3 (ref 0.7–3.1)
LYMPHOCYTES NFR BLD AUTO: 18.3 % (ref 19.6–45.3)
MCH RBC QN AUTO: 31.7 PG (ref 26.6–33)
MCHC RBC AUTO-ENTMCNC: 34.6 G/DL (ref 31.5–35.7)
MCV RBC AUTO: 91.5 FL (ref 79–97)
MONOCYTES # BLD AUTO: 0.59 10*3/MM3 (ref 0.1–0.9)
MONOCYTES NFR BLD AUTO: 7.8 % (ref 5–12)
NEUTROPHILS NFR BLD AUTO: 5.23 10*3/MM3 (ref 1.7–7)
NEUTROPHILS NFR BLD AUTO: 69.3 % (ref 42.7–76)
NRBC BLD AUTO-RTO: 0 /100 WBC (ref 0–0.2)
PLATELET # BLD AUTO: 168 10*3/MM3 (ref 140–450)
PMV BLD AUTO: 11.3 FL (ref 6–12)
POTASSIUM SERPL-SCNC: 4.4 MMOL/L (ref 3.5–5.2)
PROT SERPL-MCNC: 7.1 G/DL (ref 6–8.5)
RBC # BLD AUTO: 5.27 10*6/MM3 (ref 4.14–5.8)
SODIUM SERPL-SCNC: 138 MMOL/L (ref 136–145)
URATE SERPL-MCNC: 5.3 MG/DL (ref 3.4–7)
WBC NRBC COR # BLD: 7.55 10*3/MM3 (ref 3.4–10.8)

## 2022-01-03 PROCEDURE — 80053 COMPREHEN METABOLIC PANEL: CPT

## 2022-01-03 PROCEDURE — 86140 C-REACTIVE PROTEIN: CPT

## 2022-01-03 PROCEDURE — 36415 COLL VENOUS BLD VENIPUNCTURE: CPT

## 2022-01-03 PROCEDURE — 85025 COMPLETE CBC W/AUTO DIFF WBC: CPT

## 2022-01-03 PROCEDURE — 85652 RBC SED RATE AUTOMATED: CPT

## 2022-01-03 PROCEDURE — 84550 ASSAY OF BLOOD/URIC ACID: CPT

## 2022-02-09 ENCOUNTER — LAB (OUTPATIENT)
Dept: LAB | Facility: HOSPITAL | Age: 62
End: 2022-02-09

## 2022-02-09 DIAGNOSIS — I10 HYPERTENSION, ESSENTIAL: ICD-10-CM

## 2022-02-09 DIAGNOSIS — N18.31 STAGE 3A CHRONIC KIDNEY DISEASE: ICD-10-CM

## 2022-02-09 DIAGNOSIS — M86.071 ACUTE HEMATOGENOUS OSTEOMYELITIS OF RIGHT FOOT: ICD-10-CM

## 2022-02-09 LAB
ALBUMIN SERPL-MCNC: 4.1 G/DL (ref 3.5–5.2)
ALBUMIN/GLOB SERPL: 1.3 G/DL
ALP SERPL-CCNC: 73 U/L (ref 39–117)
ALT SERPL W P-5'-P-CCNC: 29 U/L (ref 1–41)
ANION GAP SERPL CALCULATED.3IONS-SCNC: 7.2 MMOL/L (ref 5–15)
AST SERPL-CCNC: 19 U/L (ref 1–40)
BASOPHILS # BLD AUTO: 0.06 10*3/MM3 (ref 0–0.2)
BASOPHILS NFR BLD AUTO: 0.8 % (ref 0–1.5)
BILIRUB SERPL-MCNC: 0.6 MG/DL (ref 0–1.2)
BUN SERPL-MCNC: 15 MG/DL (ref 8–23)
BUN/CREAT SERPL: 9.3 (ref 7–25)
CALCIUM SPEC-SCNC: 9.9 MG/DL (ref 8.6–10.5)
CHLORIDE SERPL-SCNC: 105 MMOL/L (ref 98–107)
CO2 SERPL-SCNC: 28.8 MMOL/L (ref 22–29)
CREAT SERPL-MCNC: 1.61 MG/DL (ref 0.76–1.27)
CRP SERPL-MCNC: <0.3 MG/DL (ref 0–0.5)
DEPRECATED RDW RBC AUTO: 42 FL (ref 37–54)
EOSINOPHIL # BLD AUTO: 0.48 10*3/MM3 (ref 0–0.4)
EOSINOPHIL NFR BLD AUTO: 6.3 % (ref 0.3–6.2)
ERYTHROCYTE [DISTWIDTH] IN BLOOD BY AUTOMATED COUNT: 12.6 % (ref 12.3–15.4)
ERYTHROCYTE [SEDIMENTATION RATE] IN BLOOD: 7 MM/HR (ref 0–20)
GFR SERPL CREATININE-BSD FRML MDRD: 44 ML/MIN/1.73
GLOBULIN UR ELPH-MCNC: 3.1 GM/DL
GLUCOSE SERPL-MCNC: 97 MG/DL (ref 65–99)
HCT VFR BLD AUTO: 51 % (ref 37.5–51)
HGB BLD-MCNC: 17.8 G/DL (ref 13–17.7)
IMM GRANULOCYTES # BLD AUTO: 0.03 10*3/MM3 (ref 0–0.05)
IMM GRANULOCYTES NFR BLD AUTO: 0.4 % (ref 0–0.5)
LYMPHOCYTES # BLD AUTO: 1.75 10*3/MM3 (ref 0.7–3.1)
LYMPHOCYTES NFR BLD AUTO: 23.1 % (ref 19.6–45.3)
MCH RBC QN AUTO: 32 PG (ref 26.6–33)
MCHC RBC AUTO-ENTMCNC: 34.9 G/DL (ref 31.5–35.7)
MCV RBC AUTO: 91.7 FL (ref 79–97)
MONOCYTES # BLD AUTO: 0.65 10*3/MM3 (ref 0.1–0.9)
MONOCYTES NFR BLD AUTO: 8.6 % (ref 5–12)
NEUTROPHILS NFR BLD AUTO: 4.59 10*3/MM3 (ref 1.7–7)
NEUTROPHILS NFR BLD AUTO: 60.8 % (ref 42.7–76)
NRBC BLD AUTO-RTO: 0 /100 WBC (ref 0–0.2)
PLATELET # BLD AUTO: 175 10*3/MM3 (ref 140–450)
PMV BLD AUTO: 11.2 FL (ref 6–12)
POTASSIUM SERPL-SCNC: 4.5 MMOL/L (ref 3.5–5.2)
PROT SERPL-MCNC: 7.2 G/DL (ref 6–8.5)
RBC # BLD AUTO: 5.56 10*6/MM3 (ref 4.14–5.8)
SODIUM SERPL-SCNC: 141 MMOL/L (ref 136–145)
URATE SERPL-MCNC: 5.1 MG/DL (ref 3.4–7)
WBC NRBC COR # BLD: 7.56 10*3/MM3 (ref 3.4–10.8)

## 2022-02-09 PROCEDURE — 86140 C-REACTIVE PROTEIN: CPT

## 2022-02-09 PROCEDURE — 36415 COLL VENOUS BLD VENIPUNCTURE: CPT

## 2022-02-09 PROCEDURE — 84550 ASSAY OF BLOOD/URIC ACID: CPT

## 2022-02-09 PROCEDURE — 85025 COMPLETE CBC W/AUTO DIFF WBC: CPT

## 2022-02-09 PROCEDURE — 85652 RBC SED RATE AUTOMATED: CPT

## 2022-02-09 PROCEDURE — 80053 COMPREHEN METABOLIC PANEL: CPT

## 2022-03-09 ENCOUNTER — LAB (OUTPATIENT)
Dept: LAB | Facility: HOSPITAL | Age: 62
End: 2022-03-09

## 2022-03-09 DIAGNOSIS — E78.2 MIXED HYPERLIPIDEMIA: ICD-10-CM

## 2022-03-09 DIAGNOSIS — R74.8 ELEVATED LIVER ENZYMES: ICD-10-CM

## 2022-03-09 DIAGNOSIS — M25.571 ACUTE RIGHT ANKLE PAIN: ICD-10-CM

## 2022-03-09 DIAGNOSIS — N18.31 STAGE 3A CHRONIC KIDNEY DISEASE: ICD-10-CM

## 2022-03-09 DIAGNOSIS — M86.9 OSTEOMYELITIS OF ANKLE AND FOOT: ICD-10-CM

## 2022-03-09 DIAGNOSIS — M86.071 ACUTE HEMATOGENOUS OSTEOMYELITIS OF RIGHT FOOT: ICD-10-CM

## 2022-03-09 DIAGNOSIS — I48.0 PAROXYSMAL ATRIAL FIBRILLATION: ICD-10-CM

## 2022-03-09 DIAGNOSIS — D75.1 POLYCYTHEMIA: ICD-10-CM

## 2022-03-09 DIAGNOSIS — I65.29 STENOSIS OF CAROTID ARTERY, UNSPECIFIED LATERALITY: ICD-10-CM

## 2022-03-09 DIAGNOSIS — I10 HYPERTENSION, ESSENTIAL: ICD-10-CM

## 2022-03-09 DIAGNOSIS — D47.2 MONOCLONAL GAMMOPATHY OF UNDETERMINED SIGNIFICANCE: ICD-10-CM

## 2022-03-09 LAB
ALBUMIN SERPL-MCNC: 3.9 G/DL (ref 3.5–5.2)
ALBUMIN/GLOB SERPL: 1.2 G/DL
ALP SERPL-CCNC: 79 U/L (ref 39–117)
ALT SERPL W P-5'-P-CCNC: 28 U/L (ref 1–41)
ANION GAP SERPL CALCULATED.3IONS-SCNC: 9.1 MMOL/L (ref 5–15)
AST SERPL-CCNC: 19 U/L (ref 1–40)
BASOPHILS # BLD AUTO: 0.06 10*3/MM3 (ref 0–0.2)
BASOPHILS NFR BLD AUTO: 0.9 % (ref 0–1.5)
BILIRUB SERPL-MCNC: 0.6 MG/DL (ref 0–1.2)
BUN SERPL-MCNC: 19 MG/DL (ref 8–23)
BUN/CREAT SERPL: 14.3 (ref 7–25)
CALCIUM SPEC-SCNC: 9.4 MG/DL (ref 8.6–10.5)
CHLORIDE SERPL-SCNC: 103 MMOL/L (ref 98–107)
CHOLEST SERPL-MCNC: 128 MG/DL (ref 0–200)
CO2 SERPL-SCNC: 27.9 MMOL/L (ref 22–29)
CREAT SERPL-MCNC: 1.33 MG/DL (ref 0.76–1.27)
CRP SERPL-MCNC: <0.3 MG/DL (ref 0–0.5)
DEPRECATED RDW RBC AUTO: 48.4 FL (ref 37–54)
EGFRCR SERPLBLD CKD-EPI 2021: 60.8 ML/MIN/1.73
EOSINOPHIL # BLD AUTO: 0.32 10*3/MM3 (ref 0–0.4)
EOSINOPHIL NFR BLD AUTO: 4.8 % (ref 0.3–6.2)
ERYTHROCYTE [DISTWIDTH] IN BLOOD BY AUTOMATED COUNT: 13.2 % (ref 12.3–15.4)
ERYTHROCYTE [SEDIMENTATION RATE] IN BLOOD: 3 MM/HR (ref 0–20)
GLOBULIN UR ELPH-MCNC: 3.2 GM/DL
GLUCOSE SERPL-MCNC: 104 MG/DL (ref 65–99)
HCT VFR BLD AUTO: 52 % (ref 37.5–51)
HDLC SERPL-MCNC: 32 MG/DL (ref 40–60)
HGB BLD-MCNC: 17.3 G/DL (ref 13–17.7)
IMM GRANULOCYTES # BLD AUTO: 0.03 10*3/MM3 (ref 0–0.05)
IMM GRANULOCYTES NFR BLD AUTO: 0.5 % (ref 0–0.5)
LDLC SERPL CALC-MCNC: 73 MG/DL (ref 0–100)
LDLC/HDLC SERPL: 2.19 {RATIO}
LYMPHOCYTES # BLD AUTO: 1.64 10*3/MM3 (ref 0.7–3.1)
LYMPHOCYTES NFR BLD AUTO: 24.6 % (ref 19.6–45.3)
MCH RBC QN AUTO: 32.5 PG (ref 26.6–33)
MCHC RBC AUTO-ENTMCNC: 33.3 G/DL (ref 31.5–35.7)
MCV RBC AUTO: 97.6 FL (ref 79–97)
MONOCYTES # BLD AUTO: 0.67 10*3/MM3 (ref 0.1–0.9)
MONOCYTES NFR BLD AUTO: 10.1 % (ref 5–12)
NEUTROPHILS NFR BLD AUTO: 3.94 10*3/MM3 (ref 1.7–7)
NEUTROPHILS NFR BLD AUTO: 59.1 % (ref 42.7–76)
NRBC BLD AUTO-RTO: 0 /100 WBC (ref 0–0.2)
PLATELET # BLD AUTO: 187 10*3/MM3 (ref 140–450)
PMV BLD AUTO: 11.1 FL (ref 6–12)
POTASSIUM SERPL-SCNC: 4.5 MMOL/L (ref 3.5–5.2)
PROT SERPL-MCNC: 7.1 G/DL (ref 6–8.5)
RBC # BLD AUTO: 5.33 10*6/MM3 (ref 4.14–5.8)
SODIUM SERPL-SCNC: 140 MMOL/L (ref 136–145)
TRIGL SERPL-MCNC: 130 MG/DL (ref 0–150)
URATE SERPL-MCNC: 3.8 MG/DL (ref 3.4–7)
VLDLC SERPL-MCNC: 23 MG/DL (ref 5–40)
WBC NRBC COR # BLD: 6.66 10*3/MM3 (ref 3.4–10.8)

## 2022-03-09 PROCEDURE — 36415 COLL VENOUS BLD VENIPUNCTURE: CPT

## 2022-03-09 PROCEDURE — 80061 LIPID PANEL: CPT

## 2022-03-09 PROCEDURE — 85025 COMPLETE CBC W/AUTO DIFF WBC: CPT

## 2022-03-09 PROCEDURE — 86140 C-REACTIVE PROTEIN: CPT

## 2022-03-09 PROCEDURE — 85652 RBC SED RATE AUTOMATED: CPT

## 2022-03-09 PROCEDURE — 80053 COMPREHEN METABOLIC PANEL: CPT

## 2022-03-09 PROCEDURE — 84550 ASSAY OF BLOOD/URIC ACID: CPT

## 2022-03-10 ENCOUNTER — OFFICE VISIT (OUTPATIENT)
Dept: INTERNAL MEDICINE | Facility: CLINIC | Age: 62
End: 2022-03-10

## 2022-03-10 VITALS
WEIGHT: 238.8 LBS | DIASTOLIC BLOOD PRESSURE: 80 MMHG | OXYGEN SATURATION: 95 % | HEART RATE: 74 BPM | RESPIRATION RATE: 22 BRPM | TEMPERATURE: 97.5 F | BODY MASS INDEX: 37.48 KG/M2 | HEIGHT: 67 IN | SYSTOLIC BLOOD PRESSURE: 143 MMHG

## 2022-03-10 DIAGNOSIS — I10 HYPERTENSION, ESSENTIAL: ICD-10-CM

## 2022-03-10 DIAGNOSIS — K59.1 FUNCTIONAL DIARRHEA: ICD-10-CM

## 2022-03-10 DIAGNOSIS — D47.2 MONOCLONAL GAMMOPATHY OF UNDETERMINED SIGNIFICANCE: ICD-10-CM

## 2022-03-10 DIAGNOSIS — E78.2 MIXED HYPERLIPIDEMIA: ICD-10-CM

## 2022-03-10 DIAGNOSIS — M86.071 ACUTE HEMATOGENOUS OSTEOMYELITIS OF RIGHT FOOT: ICD-10-CM

## 2022-03-10 DIAGNOSIS — M25.571 ACUTE RIGHT ANKLE PAIN: ICD-10-CM

## 2022-03-10 DIAGNOSIS — D75.1 POLYCYTHEMIA: ICD-10-CM

## 2022-03-10 DIAGNOSIS — I48.0 PAROXYSMAL ATRIAL FIBRILLATION: ICD-10-CM

## 2022-03-10 DIAGNOSIS — M86.9 OSTEOMYELITIS OF ANKLE AND FOOT: Primary | ICD-10-CM

## 2022-03-10 DIAGNOSIS — N18.31 STAGE 3A CHRONIC KIDNEY DISEASE: ICD-10-CM

## 2022-03-10 DIAGNOSIS — I65.29 STENOSIS OF CAROTID ARTERY, UNSPECIFIED LATERALITY: ICD-10-CM

## 2022-03-10 DIAGNOSIS — R74.8 ELEVATED LIVER ENZYMES: ICD-10-CM

## 2022-03-10 DIAGNOSIS — I50.9 CONGESTIVE HEART FAILURE, UNSPECIFIED HF CHRONICITY, UNSPECIFIED HEART FAILURE TYPE: ICD-10-CM

## 2022-03-10 PROCEDURE — 99214 OFFICE O/P EST MOD 30 MIN: CPT | Performed by: INTERNAL MEDICINE

## 2022-03-10 RX ORDER — SODIUM CHLORIDE 9 MG/ML
250 INJECTION, SOLUTION INTRAVENOUS ONCE
Status: CANCELLED | OUTPATIENT
Start: 2022-03-15

## 2022-03-10 NOTE — PROGRESS NOTES
"Chief Complaint/ HPI: still fatigued  Foot better            Objective   Vital Signs  Vitals:    03/10/22 1536   BP: 143/80   BP Location: Right arm   Patient Position: Sitting   Pulse: 74   Resp: 22   Temp: 97.5 °F (36.4 °C)   SpO2: 95%   Weight: 108 kg (238 lb 12.8 oz)   Height: 170.2 cm (67\")      Body mass index is 37.4 kg/m².  Review of Systems   Constitutional: Negative.    HENT: Negative.    Eyes: Negative.    Respiratory: Negative.    Cardiovascular: Negative.    Gastrointestinal: Negative.    Endocrine: Negative.    Genitourinary: Negative.    Musculoskeletal: Negative.    Allergic/Immunologic: Negative.    Neurological: Negative.    Hematological: Negative.    Psychiatric/Behavioral: Negative.       Physical Exam  Constitutional:       General: He is not in acute distress.     Appearance: Normal appearance. He is obese.   HENT:      Head: Normocephalic.      Mouth/Throat:      Mouth: Mucous membranes are moist.   Eyes:      Conjunctiva/sclera: Conjunctivae normal.      Pupils: Pupils are equal, round, and reactive to light.   Cardiovascular:      Rate and Rhythm: Normal rate and regular rhythm.      Pulses: Normal pulses.      Heart sounds: Normal heart sounds.   Pulmonary:      Effort: Pulmonary effort is normal.      Breath sounds: Normal breath sounds.   Abdominal:      General: Abdomen is flat. Bowel sounds are normal.      Palpations: Abdomen is soft.   Musculoskeletal:         General: No swelling. Normal range of motion.      Cervical back: Neck supple.   Skin:     General: Skin is warm and dry.      Coloration: Skin is not jaundiced.   Neurological:      General: No focal deficit present.      Mental Status: He is alert and oriented to person, place, and time. Mental status is at baseline.   Psychiatric:         Mood and Affect: Mood normal.         Behavior: Behavior normal.         Thought Content: Thought content normal.         Judgment: Judgment normal.        Result Review :   No results " found for: PROBNP, BNP  CMP    CMP 1/3/22 2/9/22 3/9/22   Glucose 94 97 104 (A)   BUN 20 15 19   Creatinine 1.27 1.61 (A) 1.33 (A)   eGFR Non  Am 58 (A) 44 (A)    Sodium 138 141 140   Potassium 4.4 4.5 4.5   Chloride 107 105 103   Calcium 9.5 9.9 9.4   Albumin 4.00 4.10 3.90   Total Bilirubin 0.4 0.6 0.6   Alkaline Phosphatase 75 73 79   AST (SGOT) 18 19 19   ALT (SGPT) 27 29 28   (A) Abnormal value            CBC w/diff    CBC w/Diff 1/3/22 2/9/22 3/9/22   WBC 7.55 7.56 6.66   RBC 5.27 5.56 5.33   Hemoglobin 16.7 17.8 (A) 17.3   Hematocrit 48.2 51.0 52.0 (A)   MCV 91.5 91.7 97.6 (A)   MCH 31.7 32.0 32.5   MCHC 34.6 34.9 33.3   RDW 12.6 12.6 13.2   Platelets 168 175 187   Neutrophil Rel % 69.3 60.8 59.1   Immature Granulocyte Rel % 0.4 0.4 0.5   Lymphocyte Rel % 18.3 (A) 23.1 24.6   Monocyte Rel % 7.8 8.6 10.1   Eosinophil Rel % 3.7 6.3 (A) 4.8   Basophil Rel % 0.5 0.8 0.9   (A) Abnormal value             Lipid Panel    Lipid Panel 9/13/21 12/7/21 3/9/22   Total Cholesterol 156 120 128   Triglycerides 81 154 (A) 130   HDL Cholesterol 33 (A) 32 (A) 32 (A)   VLDL Cholesterol 16 27 23   LDL Cholesterol  107 (A) 61 73   LDL/HDL Ratio 3.24 1.79 2.19   (A) Abnormal value             Lab Results   Component Value Date    TSH 1.750 12/07/2021    TSH 2.780 09/13/2021    TSH 2.070 08/02/2019      Lab Results   Component Value Date    FREET4 1.09 12/07/2021    FREET4 0.9 05/04/2019         PSA    PSA 9/13/21 12/7/21   PSA 1.300 1.090                          Visit Diagnoses:    ICD-10-CM ICD-9-CM   1. Osteomyelitis of ankle and foot (HCC)  M86.9 730.27   2. Acute hematogenous osteomyelitis of right foot (HCC)  M86.071 730.07   3. Stage 3a chronic kidney disease (HCC)  N18.31 585.3   4. Polycythemia  D75.1 238.4   5. Hypertension, essential  I10 401.9   6. Monoclonal gammopathy of undetermined significance  D47.2 273.1   7. Elevated liver enzymes  R74.8 790.5   8. Mixed hyperlipidemia  E78.2 272.2       Assessment and  Plan   Diagnoses and all orders for this visit:    1. Osteomyelitis of ankle and foot (HCC) (Primary)  -     Lipid Panel; Future  -     Hemoglobin A1c; Future  -     Comprehensive Metabolic Panel; Future  -     CBC & Differential; Future  -     Sedimentation Rate; Future  -     C-reactive Protein; Future    2. Acute hematogenous osteomyelitis of right foot (HCC)  -     Lipid Panel; Future  -     Hemoglobin A1c; Future  -     Comprehensive Metabolic Panel; Future  -     CBC & Differential; Future  -     Sedimentation Rate; Future  -     C-reactive Protein; Future    3. Stage 3a chronic kidney disease (HCC)  -     Lipid Panel; Future  -     Hemoglobin A1c; Future  -     Comprehensive Metabolic Panel; Future  -     CBC & Differential; Future  -     Sedimentation Rate; Future  -     C-reactive Protein; Future    4. Polycythemia  -     Lipid Panel; Future  -     Hemoglobin A1c; Future  -     Comprehensive Metabolic Panel; Future  -     CBC & Differential; Future  -     Sedimentation Rate; Future  -     C-reactive Protein; Future    5. Hypertension, essential  -     Lipid Panel; Future  -     Hemoglobin A1c; Future  -     Comprehensive Metabolic Panel; Future  -     CBC & Differential; Future  -     Sedimentation Rate; Future  -     C-reactive Protein; Future    6. Monoclonal gammopathy of undetermined significance  -     Lipid Panel; Future  -     Hemoglobin A1c; Future  -     Comprehensive Metabolic Panel; Future  -     CBC & Differential; Future  -     Sedimentation Rate; Future  -     C-reactive Protein; Future    7. Elevated liver enzymes  -     Lipid Panel; Future  -     Hemoglobin A1c; Future  -     Comprehensive Metabolic Panel; Future  -     CBC & Differential; Future  -     Sedimentation Rate; Future  -     C-reactive Protein; Future    8. Mixed hyperlipidemia  -     Lipid Panel; Future  -     Hemoglobin A1c; Future  -     Comprehensive Metabolic Panel; Future  -     CBC & Differential; Future  -      Sedimentation Rate; Future  -     C-reactive Protein; Future        Osteomyelitis, right ankle, has finished up course of antibiotics treatment options including continue doxycycline for 6 months versus following sed rates retreating if sed rates increase, discussed with patient December 9, 2021 --------------------patient had surgery for I&D and bone biopsy sept 1, 2021--- showing MRSA and gout into the right ankle and foot on September 1, 2021 by Dr. Turner at [facility] --- through [facility], had a PICC line placed, was put on IV antibiotics by infectious disease doctor and is seen, has been on vancomycin every 12 hours at home, for total of 6 weeks----patient has been released from orthopedics care as of January 2022,, patient can stop doing sed rate CRPs both levels are undetectable or essentially 0 as of March 2022, will follow clinically, will check again in 6 months on routine labs    Right ankle pain swelling ongoing for 5 weeks, July 2021,    issue is not resolving with several different rounds of medications including 2 rounds of steroids anti-inflammatories, as of August 2021, patient was and made appointment with orthopedic in [place] as above, had surgery September 1 biopsy is growing out MRSA and osteomyelitis now has PICC line with IV vancomycin for 6 weeks,     LAP CHOLEY, AND POST OP BILE LEAK/ CBD OBSTRUCTION, READMITTED,AND HAD ERCP WITH DR PICKARD--- DEVELOPED AFIB WITH RVR, SEPTIC , ELEVATED WBC, APRIL 2019, CARDIOVERTED, WAS RX WITH ABX, HAD MARIAN DRAIN PLACED BY RADIOLOGY-- AND THEN ANOTHER MARIAN DRAIN PLACED, --THORACENTISIS ALSO DONE TIMES 2, --HAD STENT REMOVED JUNE 2019, HAD STRESS TEST WITH [facility]AND,, May 2019, no evidence of ischemia and normal myocardial SPECT perfusion study,-------CARDIAC EVENT MONITOR 8/2019, --NO AFIB ------- ON ELIQUIS -- AUG 2019, , Stopped eliquis October 2019    Gout, cont allopurinol 300 mg qd as aug 2021, current uric acid level 3.8 March 2022    [facility]  CALCIFICATIONS SEEN ON CAROTIDS ---ON PLAIN XRAY PER ISAAC FOR BONE SURVERY ----- CAROTID U/S, MARCH 2021,, no stenosis present carotid bifurcations, mild to moderate mixed density nonstenotic plaque bilateral, vertebral flow antegrade,    --DUMPING SYNDROME , RX COLESTID ---SEPT 2020, ---resolved with keto diet and weight loss, currently not taking Colestid, March 2021,    POLYCYTHEMIA, ----- recommend phlebotomy 500 cc of blood due to hematocrit , Discussed March 2021, patient will consider and we can set up for phlebotomy hematocrit 52, March 13, 2021,, stable hemoglobin 16.9 December 2021 ---will redo therapy plan for phlebotomy 500 cc of blood,    Elevated creatinine, baseline 1.2 --stable --march 2022    HTN--cont ATACAND 16 MG QD, METOPROLOL 50 QOD AND 75 QOD ,     ELEVATED CHOL.--cont CRESTOR 10 QHS--    ELEVATED LFTS, KEENAN--NORMAL NOW DEC 2016, , April 2018, ultrasounds liver reviewed, October 2017 and March 2017, shows improvement in liver size,---Has had ultrasounds and fibrosis scan October 2019, slightly elevated score of 0.39 stage F1 to F2, moderate steatosis, has been followed by GI service, current liver enzymes are normal, September 2020---current ultrasound March 5, 2021 shows normal echogenicity of the liver texture, no liver lesions, no biliary ductal dilation, otherwise unremarkable--- will monitor clinically and with labs,    MONOCLONAL PARAPROTEINURIA, DX IN 2015-16, NOW GOING TO [place] FOR F/U Total proteins have been stable over the last several years through [place] labs, in follow-up, no treatment at this time,===    OA, OF L HIP AND NICOLAS 12/13, KAREN    OVERWGT-continue weight loss efforts, start walking more exercise if he can    LVH ON EKG AND ECHO 2016  LAST CSCOPE 9/13 AND EGD , AND AUG 2017, NEERAJ                 Follow Up   Return in about 6 months (around 9/10/2022).  Patient was given instructions and counseling regarding his condition or for health maintenance advice.  Please see specific information pulled into the AVS if appropriate.

## 2022-03-17 ENCOUNTER — HOSPITAL ENCOUNTER (OUTPATIENT)
Dept: INFUSION THERAPY | Facility: HOSPITAL | Age: 62
Discharge: HOME | End: 2022-03-17
Admitting: INTERNAL MEDICINE

## 2022-03-17 VITALS
DIASTOLIC BLOOD PRESSURE: 81 MMHG | WEIGHT: 237.66 LBS | RESPIRATION RATE: 20 BRPM | BODY MASS INDEX: 37.3 KG/M2 | TEMPERATURE: 97.9 F | HEART RATE: 78 BPM | OXYGEN SATURATION: 98 % | HEIGHT: 67 IN | SYSTOLIC BLOOD PRESSURE: 134 MMHG

## 2022-03-17 DIAGNOSIS — I10 HYPERTENSION, ESSENTIAL: ICD-10-CM

## 2022-03-17 DIAGNOSIS — E78.2 MIXED HYPERLIPIDEMIA: ICD-10-CM

## 2022-03-17 DIAGNOSIS — M86.071 ACUTE HEMATOGENOUS OSTEOMYELITIS OF RIGHT FOOT: ICD-10-CM

## 2022-03-17 DIAGNOSIS — D47.2 MONOCLONAL GAMMOPATHY OF UNDETERMINED SIGNIFICANCE: ICD-10-CM

## 2022-03-17 DIAGNOSIS — I65.29 STENOSIS OF CAROTID ARTERY, UNSPECIFIED LATERALITY: ICD-10-CM

## 2022-03-17 DIAGNOSIS — N18.31 STAGE 3A CHRONIC KIDNEY DISEASE: ICD-10-CM

## 2022-03-17 DIAGNOSIS — I50.9 CONGESTIVE HEART FAILURE, UNSPECIFIED HF CHRONICITY, UNSPECIFIED HEART FAILURE TYPE: ICD-10-CM

## 2022-03-17 DIAGNOSIS — D75.1 POLYCYTHEMIA: Primary | ICD-10-CM

## 2022-03-17 DIAGNOSIS — I48.0 PAROXYSMAL ATRIAL FIBRILLATION: ICD-10-CM

## 2022-03-17 DIAGNOSIS — K59.1 FUNCTIONAL DIARRHEA: ICD-10-CM

## 2022-03-17 DIAGNOSIS — M25.571 ACUTE RIGHT ANKLE PAIN: ICD-10-CM

## 2022-03-17 DIAGNOSIS — M86.9 OSTEOMYELITIS OF ANKLE AND FOOT: ICD-10-CM

## 2022-03-17 DIAGNOSIS — R74.8 ELEVATED LIVER ENZYMES: ICD-10-CM

## 2022-03-17 LAB
HCT VFR BLD AUTO: 47.7 % (ref 37.5–51)
HGB BLD-MCNC: 16.3 G/DL (ref 13–17.7)

## 2022-03-17 PROCEDURE — 85014 HEMATOCRIT: CPT | Performed by: INTERNAL MEDICINE

## 2022-03-17 PROCEDURE — 96523 IRRIG DRUG DELIVERY DEVICE: CPT

## 2022-03-17 PROCEDURE — 99195 PHLEBOTOMY: CPT

## 2022-03-17 PROCEDURE — 85018 HEMOGLOBIN: CPT | Performed by: INTERNAL MEDICINE

## 2022-03-17 NOTE — CODE DOCUMENTATION
A couple of minutes after phlebotomy started, patient complained of a little lightheadedness, he did feel diaphoretic to touch.  Phlebotomy immediately stopped.  Post b/p within normal range, patient states he is feeling better.  Denies the need for fluids.

## 2022-04-08 ENCOUNTER — TELEPHONE (OUTPATIENT)
Dept: GASTROENTEROLOGY | Facility: CLINIC | Age: 62
End: 2022-04-08

## 2022-04-08 ENCOUNTER — PREP FOR SURGERY (OUTPATIENT)
Dept: OTHER | Facility: HOSPITAL | Age: 62
End: 2022-04-08

## 2022-04-08 ENCOUNTER — CLINICAL SUPPORT (OUTPATIENT)
Dept: GASTROENTEROLOGY | Facility: CLINIC | Age: 62
End: 2022-04-08

## 2022-04-08 DIAGNOSIS — Z80.0 FAMILY HX OF COLON CANCER: Primary | ICD-10-CM

## 2022-04-08 NOTE — TELEPHONE ENCOUNTER
Paolo Goldstein  REASON FOR CALL Colon Screening  SENT IN PREP [facility]   Past Medical History:   Diagnosis Date   • A-fib (HCC)    • Acid reflux    • Arthritis    • Colon polyps    • Essential hypertension    • Family history of colon cancer    • Fatty liver    • GERD (gastroesophageal reflux disease)    • Heart disease    • High blood pressure    • Hip pain     LEFT    • Hyperlipemia    • Hypertension    • MGUS (monoclonal gammopathy of unknown significance)    • Multiple myeloma (HCC)    • Pes cavus of left foot 01/12/2019   • Primary osteoarthritis of left foot 01/12/2019   • RUQ pain    • Sciatic nerve pain      No Known Allergies  Past Surgical History:   Procedure Laterality Date   • ANKLE SURGERY Right 1977    RIGHT ANKLE CLOSED REDUCTION    • CARDIOVERSION     • CHOLECYSTECTOMY      LAPAROSCOPIC   • COLONOSCOPY  04/21/2017    DR. PICKARD   • ENDOSCOPY  08/09/2013       • HIP ARTHROPLASTY Left    • HIP BIPOLAR REPLACEMENT Left    • OTHER SURGICAL HISTORY      ARTIFICAL JOINTS/LIMBS    • OTHER SURGICAL HISTORY      JOINT SURGERY   • OTHER SURGICAL HISTORY      I & D for osteomylitis right foot     Social History     Socioeconomic History   • Marital status:    Tobacco Use   • Smoking status: Never Smoker   • Smokeless tobacco: Never Used   Vaping Use   • Vaping Use: Never used   Substance and Sexual Activity   • Alcohol use: Yes     Comment: occasionally   • Drug use: Never   • Sexual activity: Yes     Partners: Female     Birth control/protection: None     Family History   Problem Relation Age of Onset   • Stroke Mother    • Arthritis Mother    • Prostate cancer Father 69   • Heart disease Father    • Colon cancer Paternal Grandmother 60   • Colon cancer Paternal Aunt    • Colon cancer Paternal Uncle        Current Outpatient Medications:   •  allopurinol (Zyloprim) 300 MG tablet, Take 1 tablet by mouth Daily., Disp: 30 tablet, Rfl: 5  •  aspirin 81 MG EC tablet, Aspir-81 81 mg oral  tablet,delayed release (DR/EC) take 1 tablet (81 mg) by oral route once daily   Active, Disp: , Rfl:   •  candesartan (ATACAND) 16 MG tablet, Take 1 tablet by mouth Daily., Disp: 90 tablet, Rfl: 3  •  colestipol (COLESTID) 5 g granules, Take 1 g by mouth., Disp: , Rfl:   •  Loratadine 10 MG capsule, As Needed., Disp: , Rfl:   •  metoprolol succinate XL (Toprol XL) 50 MG 24 hr tablet, Take 1.5 tablets by mouth Daily. Take  1 1/2 tablets by mouth every night, Disp: 135 tablet, Rfl: 3  •  MISC NATURAL PRODUCTS OT, Take 1 tablet by mouth Daily., Disp: , Rfl:   •  pantoprazole (Protonix) 40 MG EC tablet, Take 1 tablet by mouth Daily., Disp: 30 tablet, Rfl: 5  •  rosuvastatin (CRESTOR) 10 MG tablet, Take 1 tablet by mouth Daily., Disp: 90 tablet, Rfl: 3  •  Turmeric 1053 MG tablet, turmeric 400 mg oral capsule take 1 capsule by oral route daily   Active, Disp: , Rfl:   •  vitamin D3 125 MCG (5000 UT) capsule capsule, Take 5,000 Units by mouth Daily., Disp: , Rfl:

## 2022-04-12 RX ORDER — ALLOPURINOL 300 MG/1
300 TABLET ORAL DAILY
Qty: 30 TABLET | Refills: 5 | Status: SHIPPED | OUTPATIENT
Start: 2022-04-12 | End: 2023-01-06 | Stop reason: SDUPTHER

## 2022-04-18 ENCOUNTER — TELEPHONE (OUTPATIENT)
Dept: CARDIOLOGY | Facility: CLINIC | Age: 62
End: 2022-04-18

## 2022-04-18 NOTE — TELEPHONE ENCOUNTER
Procedure: Colonoscopy and/or EGD    Med Directive: N/A    PMH: Afib; HTN; HLD; CHF    Last Seen: 12/13/2021

## 2022-04-20 ENCOUNTER — TELEPHONE (OUTPATIENT)
Dept: INTERNAL MEDICINE | Facility: CLINIC | Age: 62
End: 2022-04-20

## 2022-05-03 RX ORDER — PANTOPRAZOLE SODIUM 40 MG/1
40 TABLET, DELAYED RELEASE ORAL DAILY
Qty: 30 TABLET | Refills: 5 | Status: SHIPPED | OUTPATIENT
Start: 2022-05-03 | End: 2023-03-08 | Stop reason: SDUPTHER

## 2022-06-02 ENCOUNTER — TELEPHONE (OUTPATIENT)
Dept: GASTROENTEROLOGY | Facility: CLINIC | Age: 62
End: 2022-06-02

## 2022-06-02 NOTE — TELEPHONE ENCOUNTER
Dr. Dodd advised that he will need to be out of the office on 06.24.     Patient was on the schedule for an ENDO procedure this date. I spoke with patient and spouse and have rescheduled this appointment.       Procedure:  Colon Screening     New Appointment Date: 08.19 at 830

## 2022-06-02 NOTE — TELEPHONE ENCOUNTER
Dr. Dodd advised that he will need to be out of the office on 06.24.      Patient has an upcoming ENDO procedure. I called patient to reschedule. No answer. Left message for patient to call back.         Procedure: Colon Screening    I spoke with  on the phone. He verbally understood  would be out and that he needed to r/s. He is currently at the hospital working and will call back to r/s.

## 2022-06-09 ENCOUNTER — LAB (OUTPATIENT)
Dept: LAB | Facility: HOSPITAL | Age: 62
End: 2022-06-09

## 2022-06-09 DIAGNOSIS — D47.2 MONOCLONAL GAMMOPATHY OF UNDETERMINED SIGNIFICANCE: ICD-10-CM

## 2022-06-09 DIAGNOSIS — D75.1 POLYCYTHEMIA: ICD-10-CM

## 2022-06-09 DIAGNOSIS — I48.0 PAROXYSMAL ATRIAL FIBRILLATION: ICD-10-CM

## 2022-06-09 DIAGNOSIS — I10 HYPERTENSION, ESSENTIAL: ICD-10-CM

## 2022-06-09 DIAGNOSIS — R74.8 ELEVATED LIVER ENZYMES: ICD-10-CM

## 2022-06-09 DIAGNOSIS — E78.2 MIXED HYPERLIPIDEMIA: ICD-10-CM

## 2022-06-09 DIAGNOSIS — M86.9 OSTEOMYELITIS OF ANKLE AND FOOT: ICD-10-CM

## 2022-06-09 DIAGNOSIS — N18.31 STAGE 3A CHRONIC KIDNEY DISEASE: ICD-10-CM

## 2022-06-09 DIAGNOSIS — K59.1 FUNCTIONAL DIARRHEA: ICD-10-CM

## 2022-06-09 DIAGNOSIS — M86.071 ACUTE HEMATOGENOUS OSTEOMYELITIS OF RIGHT FOOT: ICD-10-CM

## 2022-06-09 DIAGNOSIS — I65.29 STENOSIS OF CAROTID ARTERY, UNSPECIFIED LATERALITY: ICD-10-CM

## 2022-06-09 DIAGNOSIS — I50.9 CONGESTIVE HEART FAILURE, UNSPECIFIED HF CHRONICITY, UNSPECIFIED HEART FAILURE TYPE: ICD-10-CM

## 2022-06-09 DIAGNOSIS — M25.571 ACUTE RIGHT ANKLE PAIN: ICD-10-CM

## 2022-06-09 LAB
ALBUMIN SERPL-MCNC: 3.9 G/DL (ref 3.5–5.2)
ALBUMIN/GLOB SERPL: 1.3 G/DL
ALP SERPL-CCNC: 77 U/L (ref 39–117)
ALT SERPL W P-5'-P-CCNC: 22 U/L (ref 1–41)
ANION GAP SERPL CALCULATED.3IONS-SCNC: 11.2 MMOL/L (ref 5–15)
AST SERPL-CCNC: 17 U/L (ref 1–40)
BASOPHILS # BLD AUTO: 0.07 10*3/MM3 (ref 0–0.2)
BASOPHILS NFR BLD AUTO: 0.9 % (ref 0–1.5)
BILIRUB SERPL-MCNC: 0.4 MG/DL (ref 0–1.2)
BUN SERPL-MCNC: 20 MG/DL (ref 8–23)
BUN/CREAT SERPL: 15.7 (ref 7–25)
CALCIUM SPEC-SCNC: 9.4 MG/DL (ref 8.6–10.5)
CHLORIDE SERPL-SCNC: 105 MMOL/L (ref 98–107)
CHOLEST SERPL-MCNC: 130 MG/DL (ref 0–200)
CO2 SERPL-SCNC: 21.8 MMOL/L (ref 22–29)
CREAT SERPL-MCNC: 1.27 MG/DL (ref 0.76–1.27)
CRP SERPL-MCNC: <0.3 MG/DL (ref 0–0.5)
DEPRECATED RDW RBC AUTO: 42 FL (ref 37–54)
EGFRCR SERPLBLD CKD-EPI 2021: 64.3 ML/MIN/1.73
EOSINOPHIL # BLD AUTO: 0.39 10*3/MM3 (ref 0–0.4)
EOSINOPHIL NFR BLD AUTO: 4.9 % (ref 0.3–6.2)
ERYTHROCYTE [DISTWIDTH] IN BLOOD BY AUTOMATED COUNT: 12.4 % (ref 12.3–15.4)
ERYTHROCYTE [SEDIMENTATION RATE] IN BLOOD: 17 MM/HR (ref 0–20)
GLOBULIN UR ELPH-MCNC: 2.9 GM/DL
GLUCOSE SERPL-MCNC: 96 MG/DL (ref 65–99)
HCT VFR BLD AUTO: 50 % (ref 37.5–51)
HDLC SERPL-MCNC: 33 MG/DL (ref 40–60)
HGB BLD-MCNC: 17.1 G/DL (ref 13–17.7)
IMM GRANULOCYTES # BLD AUTO: 0.04 10*3/MM3 (ref 0–0.05)
IMM GRANULOCYTES NFR BLD AUTO: 0.5 % (ref 0–0.5)
LDLC SERPL CALC-MCNC: 75 MG/DL (ref 0–100)
LDLC/HDLC SERPL: 2.21 {RATIO}
LYMPHOCYTES # BLD AUTO: 1.93 10*3/MM3 (ref 0.7–3.1)
LYMPHOCYTES NFR BLD AUTO: 24.5 % (ref 19.6–45.3)
MAGNESIUM SERPL-MCNC: 2 MG/DL (ref 1.6–2.4)
MCH RBC QN AUTO: 32.1 PG (ref 26.6–33)
MCHC RBC AUTO-ENTMCNC: 34.2 G/DL (ref 31.5–35.7)
MCV RBC AUTO: 94 FL (ref 79–97)
MONOCYTES # BLD AUTO: 0.73 10*3/MM3 (ref 0.1–0.9)
MONOCYTES NFR BLD AUTO: 9.3 % (ref 5–12)
NEUTROPHILS NFR BLD AUTO: 4.72 10*3/MM3 (ref 1.7–7)
NEUTROPHILS NFR BLD AUTO: 59.9 % (ref 42.7–76)
NRBC BLD AUTO-RTO: 0 /100 WBC (ref 0–0.2)
PLATELET # BLD AUTO: 179 10*3/MM3 (ref 140–450)
PMV BLD AUTO: 11.2 FL (ref 6–12)
POTASSIUM SERPL-SCNC: 4.6 MMOL/L (ref 3.5–5.2)
PROT SERPL-MCNC: 6.8 G/DL (ref 6–8.5)
RBC # BLD AUTO: 5.32 10*6/MM3 (ref 4.14–5.8)
SODIUM SERPL-SCNC: 138 MMOL/L (ref 136–145)
TRIGL SERPL-MCNC: 120 MG/DL (ref 0–150)
URATE SERPL-MCNC: 3.8 MG/DL (ref 3.4–7)
VLDLC SERPL-MCNC: 22 MG/DL (ref 5–40)
WBC NRBC COR # BLD: 7.88 10*3/MM3 (ref 3.4–10.8)

## 2022-06-09 PROCEDURE — 84550 ASSAY OF BLOOD/URIC ACID: CPT

## 2022-06-09 PROCEDURE — 80053 COMPREHEN METABOLIC PANEL: CPT

## 2022-06-09 PROCEDURE — 80061 LIPID PANEL: CPT

## 2022-06-09 PROCEDURE — 86140 C-REACTIVE PROTEIN: CPT

## 2022-06-09 PROCEDURE — 85652 RBC SED RATE AUTOMATED: CPT

## 2022-06-09 PROCEDURE — 85025 COMPLETE CBC W/AUTO DIFF WBC: CPT

## 2022-06-09 PROCEDURE — 83735 ASSAY OF MAGNESIUM: CPT

## 2022-06-09 PROCEDURE — 36415 COLL VENOUS BLD VENIPUNCTURE: CPT

## 2022-06-12 NOTE — ASSESSMENT & PLAN NOTE
Mixed hyperlipidemia with LDL almost at goal.  Instructed to stop his rosuvastatin for 2 weeks to see if it helps with myalgias and then restarted.  If he notices a difference he will call us.  Otherwise continue rosuvastatin 10 mg a day.  He used to be a goal on this med when he was following a strict diet.  He plans to restart that diet.

## 2022-06-12 NOTE — PROGRESS NOTES
Chief Complaint  Hypertension, Atrial Fibrillation, and Hyperlipidemia    Subjective        Paolo Goldstein presents to [facility] CARDIOLOGY  He is a 61-year-old male comes in to evaluate his hypertension, hyperlipidemia and proximal atrial fibrillation.  States he was having a lot of issues with gout so he stopped his hydrochlorothiazide.  Since then blood pressures gone up from the 110-130 systolic range.  He is also complaining of some myalgias. Denies any chest pains, shortness of breath, palpitations, dizziness, syncope, swelling, PND, or orthopnea.  Cardiac wise he has no further complaints.  He is down about 3 pound since last visit.     Past History:    Past Medical History:   Diagnosis Date   • A-fib (HCC)    • Acid reflux    • Arthritis    • Colon polyps    • Essential hypertension    • Family history of colon cancer    • Fatty liver    • GERD (gastroesophageal reflux disease)    • Heart disease    • High blood pressure    • Hip pain     LEFT    • Hyperlipemia    • Hypertension    • MGUS (monoclonal gammopathy of unknown significance)    • Multiple myeloma (HCC)    • Pes cavus of left foot 01/12/2019   • Primary osteoarthritis of left foot 01/12/2019   • RUQ pain    • Sciatic nerve pain         Family History: family history includes Arthritis in his mother; Colon cancer in his paternal aunt and paternal uncle; Colon cancer (age of onset: 60) in his paternal grandmother; Heart disease in his father; Prostate cancer (age of onset: 69) in his father; Stroke in his mother.     Social History: reports that he has never smoked. He has never used smokeless tobacco. He reports current alcohol use. He reports that he does not use drugs.    Allergies: Patient has no known allergies.    Past Surgical History:   Procedure Laterality Date   • ANKLE SURGERY Right 1977    RIGHT ANKLE CLOSED REDUCTION    • CARDIOVERSION     • CHOLECYSTECTOMY      LAPAROSCOPIC   • COLONOSCOPY  04/21/2017    DR. PICKARD    • ENDOSCOPY  08/09/2013       • HIP ARTHROPLASTY Left    • HIP BIPOLAR REPLACEMENT Left    • OTHER SURGICAL HISTORY      ARTIFICAL JOINTS/LIMBS    • OTHER SURGICAL HISTORY      JOINT SURGERY   • OTHER SURGICAL HISTORY      I & D for osteomylitis right foot          Current Outpatient Medications:   •  allopurinol (ZYLOPRIM) 300 MG tablet, TAKE 1 TABLET BY MOUTH DAILY, Disp: 30 tablet, Rfl: 5  •  aspirin 81 MG EC tablet, Aspir-81 81 mg oral tablet,delayed release (DR/EC) take 1 tablet (81 mg) by oral route once daily   Active, Disp: , Rfl:   •  colestipol (COLESTID) 5 g granules, Take 1 g by mouth., Disp: , Rfl:   •  Loratadine 10 MG capsule, As Needed., Disp: , Rfl:   •  metoprolol succinate XL (Toprol XL) 50 MG 24 hr tablet, Take 1.5 tablets by mouth Daily. Take  1 1/2 tablets by mouth every night, Disp: 135 tablet, Rfl: 3  •  MISC NATURAL PRODUCTS OT, Take 1 tablet by mouth Daily., Disp: , Rfl:   •  pantoprazole (PROTONIX) 40 MG EC tablet, TAKE 1 TABLET BY MOUTH DAILY, Disp: 30 tablet, Rfl: 5  •  polyethylene glycol (GoLYTELY) 236 g solution, Please follow instructions from your provider's office., Disp: 4000 mL, Rfl: 0  •  rosuvastatin (CRESTOR) 10 MG tablet, Take 1 tablet by mouth Daily., Disp: 90 tablet, Rfl: 3  •  Turmeric 1053 MG tablet, turmeric 400 mg oral capsule take 1 capsule by oral route daily   Active, Disp: , Rfl:   •  vitamin D3 125 MCG (5000 UT) capsule capsule, Take 5,000 Units by mouth Daily., Disp: , Rfl:   •  candesartan (ATACAND) 16 MG tablet, Take  1 1/2 tablet daily, Disp: 135 tablet, Rfl: 3     Medications Discontinued During This Encounter   Medication Reason   • candesartan (ATACAND) 16 MG tablet Dose adjustment   • rosuvastatin (CRESTOR) 10 MG tablet Reorder        Review of Systems   Constitutional: Negative for fatigue.   Respiratory: Negative for chest tightness and shortness of breath.    Cardiovascular: Negative for chest pain, palpitations and leg swelling.  "  Musculoskeletal: Positive for myalgias.   Neurological: Negative for dizziness, syncope, weakness, light-headedness and numbness.   All other systems reviewed and are negative.       Objective     Physical Exam  Constitutional:       General: He is not in acute distress.     Appearance: He is obese.   Neck:      Vascular: No carotid bruit.   Cardiovascular:      Rate and Rhythm: Normal rate and regular rhythm.      Heart sounds: Normal heart sounds.   Pulmonary:      Effort: Pulmonary effort is normal.      Breath sounds: Normal breath sounds.   Musculoskeletal:      Right lower leg: No edema.      Left lower leg: No edema.   Neurological:      Mental Status: He is alert.       /78 (BP Location: Right arm)   Pulse 61   Ht 170.2 cm (67\")   Wt 104 kg (230 lb)   BMI 36.02 kg/m²       Vitals:    06/15/22 0752   BP: 131/78   Pulse: 61       Result Review :         The following data was reviewed by: LON Crystal on 06/15/2022:      CMP    CMP 2/9/22 3/9/22 6/9/22   Glucose 97 104 (A) 96   BUN 15 19 20   Creatinine 1.61 (A) 1.33 (A) 1.27   eGFR Non African Am 44 (A)     Sodium 141 140 138   Potassium 4.5 4.5 4.6   Chloride 105 103 105   Calcium 9.9 9.4 9.4   Albumin 4.10 3.90 3.90   Total Bilirubin 0.6 0.6 0.4   Alkaline Phosphatase 73 79 77   AST (SGOT) 19 19 17   ALT (SGPT) 29 28 22   (A) Abnormal value            CBC w/diff    CBC w/Diff 3/9/22 3/17/22 6/9/22   WBC 6.66  7.88   RBC 5.33  5.32   Hemoglobin 17.3 16.3 17.1   Hematocrit 52.0 (A) 47.7 50.0   MCV 97.6 (A)  94.0   MCH 32.5  32.1   MCHC 33.3  34.2   RDW 13.2  12.4   Platelets 187  179   Neutrophil Rel % 59.1  59.9   Immature Granulocyte Rel % 0.5  0.5   Lymphocyte Rel % 24.6  24.5   Monocyte Rel % 10.1  9.3   Eosinophil Rel % 4.8  4.9   Basophil Rel % 0.9  0.9   (A) Abnormal value             Lipid Panel    Lipid Panel 12/7/21 3/9/22 6/9/22   Total Cholesterol 120 128 130   Triglycerides 154 (A) 130 120   HDL Cholesterol 32 (A) 32 (A) 33 " (A)   VLDL Cholesterol 27 23 22   LDL Cholesterol  61 73 75   LDL/HDL Ratio 1.79 2.19 2.21   (A) Abnormal value             Lab Results   Component Value Date    TSH 1.750 12/07/2021    TSH 2.780 09/13/2021    TSH 2.070 08/02/2019      Lab Results   Component Value Date    FREET4 1.09 12/07/2021    FREET4 0.9 05/04/2019      Magnesium   Date Value Ref Range Status   06/09/2022 2.0 1.6 - 2.4 mg/dL Final                      Assessment and Plan    Diagnoses and all orders for this visit:    1. Paroxysmal atrial fibrillation (HCC) (Primary)  Assessment & Plan:  Currently in sinus and no recent episodes.  Continue metoprolol ER 50 mg 1-1/2 tabs at night and aspirin 81.      2. Hypertension, essential  Assessment & Plan:  Blood pressures starting to increase without hydrochlorothiazide.  Increase candesartan 16 mg 1-1/2 tabs in the morning    Orders:  -     candesartan (ATACAND) 16 MG tablet; Take  1 1/2 tablet daily  Dispense: 135 tablet; Refill: 3    3. Mixed hyperlipidemia  Assessment & Plan:  Mixed hyperlipidemia with LDL almost at goal.  Instructed to stop his rosuvastatin for 2 weeks to see if it helps with myalgias and then restarted.  If he notices a difference he will call us.  Otherwise continue rosuvastatin 10 mg a day.  He used to be a goal on this med when he was following a strict diet.  He plans to restart that diet.    Orders:  -     rosuvastatin (CRESTOR) 10 MG tablet; Take 1 tablet by mouth Daily.  Dispense: 90 tablet; Refill: 3  -     Lipid Panel; Future  -     Comprehensive Metabolic Panel; Future    4. Stage 3a chronic kidney disease (HCC)  Assessment & Plan:  Kidney function much better.  Continue to avoid NSAIDs            Follow Up     Return in about 6 months (around 12/15/2022) for with Monnin.    Patient was given instructions and counseling regarding his condition or for health maintenance advice. Please see specific information pulled into the AVS if appropriate.       Ana Maria Gomez  APRN  06/15/22 17:46 EDT

## 2022-06-15 ENCOUNTER — OFFICE VISIT (OUTPATIENT)
Dept: CARDIOLOGY | Facility: CLINIC | Age: 62
End: 2022-06-15

## 2022-06-15 VITALS
HEIGHT: 67 IN | DIASTOLIC BLOOD PRESSURE: 78 MMHG | HEART RATE: 61 BPM | BODY MASS INDEX: 36.1 KG/M2 | WEIGHT: 230 LBS | SYSTOLIC BLOOD PRESSURE: 131 MMHG

## 2022-06-15 DIAGNOSIS — E78.2 MIXED HYPERLIPIDEMIA: Chronic | ICD-10-CM

## 2022-06-15 DIAGNOSIS — N18.31 STAGE 3A CHRONIC KIDNEY DISEASE: ICD-10-CM

## 2022-06-15 DIAGNOSIS — I48.0 PAROXYSMAL ATRIAL FIBRILLATION: Primary | ICD-10-CM

## 2022-06-15 DIAGNOSIS — I10 HYPERTENSION, ESSENTIAL: Chronic | ICD-10-CM

## 2022-06-15 PROCEDURE — 99214 OFFICE O/P EST MOD 30 MIN: CPT | Performed by: NURSE PRACTITIONER

## 2022-06-15 RX ORDER — CANDESARTAN 16 MG/1
TABLET ORAL
Qty: 135 TABLET | Refills: 3 | Status: SHIPPED | OUTPATIENT
Start: 2022-06-15 | End: 2022-08-01 | Stop reason: SDUPTHER

## 2022-06-15 RX ORDER — CANDESARTAN 16 MG/1
16 TABLET ORAL DAILY
Qty: 90 TABLET | Refills: 3 | Status: CANCELLED | OUTPATIENT
Start: 2022-06-15

## 2022-06-15 RX ORDER — ROSUVASTATIN CALCIUM 10 MG/1
10 TABLET, COATED ORAL DAILY
Qty: 90 TABLET | Refills: 3 | Status: SHIPPED | OUTPATIENT
Start: 2022-06-15 | End: 2022-09-01 | Stop reason: SDUPTHER

## 2022-06-15 NOTE — ASSESSMENT & PLAN NOTE
Blood pressures starting to increase without hydrochlorothiazide.  Increase candesartan 16 mg 1-1/2 tabs in the morning

## 2022-06-15 NOTE — ASSESSMENT & PLAN NOTE
Currently in sinus and no recent episodes.  Continue metoprolol ER 50 mg 1-1/2 tabs at night and aspirin 81.

## 2022-08-01 ENCOUNTER — TELEPHONE (OUTPATIENT)
Dept: CARDIOLOGY | Facility: CLINIC | Age: 62
End: 2022-08-01

## 2022-08-01 DIAGNOSIS — I10 HYPERTENSION, ESSENTIAL: Chronic | ICD-10-CM

## 2022-08-01 RX ORDER — CANDESARTAN 16 MG/1
TABLET ORAL
Qty: 135 TABLET | Refills: 3 | Status: SHIPPED | OUTPATIENT
Start: 2022-08-01

## 2022-08-19 ENCOUNTER — ANESTHESIA EVENT (OUTPATIENT)
Dept: GASTROENTEROLOGY | Facility: HOSPITAL | Age: 62
End: 2022-08-19

## 2022-08-19 ENCOUNTER — HOSPITAL ENCOUNTER (OUTPATIENT)
Facility: HOSPITAL | Age: 62
Setting detail: HOSPITAL OUTPATIENT SURGERY
Discharge: HOME | End: 2022-08-19
Attending: INTERNAL MEDICINE | Admitting: INTERNAL MEDICINE

## 2022-08-19 ENCOUNTER — ANESTHESIA (OUTPATIENT)
Dept: GASTROENTEROLOGY | Facility: HOSPITAL | Age: 62
End: 2022-08-19

## 2022-08-19 VITALS
RESPIRATION RATE: 20 BRPM | OXYGEN SATURATION: 95 % | HEART RATE: 63 BPM | BODY MASS INDEX: 36.39 KG/M2 | TEMPERATURE: 97.1 F | DIASTOLIC BLOOD PRESSURE: 54 MMHG | WEIGHT: 232.37 LBS | SYSTOLIC BLOOD PRESSURE: 77 MMHG

## 2022-08-19 DIAGNOSIS — Z80.0 FAMILY HX OF COLON CANCER: ICD-10-CM

## 2022-08-19 PROCEDURE — 88305 TISSUE EXAM BY PATHOLOGIST: CPT | Performed by: INTERNAL MEDICINE

## 2022-08-19 PROCEDURE — 25010000002 PROPOFOL 10 MG/ML EMULSION: Performed by: NURSE ANESTHETIST, CERTIFIED REGISTERED

## 2022-08-19 PROCEDURE — 45385 COLONOSCOPY W/LESION REMOVAL: CPT | Performed by: INTERNAL MEDICINE

## 2022-08-19 RX ORDER — PROPOFOL 10 MG/ML
VIAL (ML) INTRAVENOUS AS NEEDED
Status: DISCONTINUED | OUTPATIENT
Start: 2022-08-19 | End: 2022-08-19 | Stop reason: SURG

## 2022-08-19 RX ORDER — SODIUM CHLORIDE, SODIUM LACTATE, POTASSIUM CHLORIDE, CALCIUM CHLORIDE 600; 310; 30; 20 MG/100ML; MG/100ML; MG/100ML; MG/100ML
30 INJECTION, SOLUTION INTRAVENOUS CONTINUOUS
Status: DISCONTINUED | OUTPATIENT
Start: 2022-08-19 | End: 2022-08-19 | Stop reason: HOSPADM

## 2022-08-19 RX ORDER — LIDOCAINE HYDROCHLORIDE 20 MG/ML
INJECTION, SOLUTION EPIDURAL; INFILTRATION; INTRACAUDAL; PERINEURAL AS NEEDED
Status: DISCONTINUED | OUTPATIENT
Start: 2022-08-19 | End: 2022-08-19 | Stop reason: SURG

## 2022-08-19 RX ADMIN — PROPOFOL 225 MCG/KG/MIN: 10 INJECTION, EMULSION INTRAVENOUS at 09:35

## 2022-08-19 RX ADMIN — LIDOCAINE HYDROCHLORIDE 100 MG: 20 INJECTION, SOLUTION EPIDURAL; INFILTRATION; INTRACAUDAL; PERINEURAL at 09:35

## 2022-08-19 RX ADMIN — PROPOFOL 100 MG: 10 INJECTION, EMULSION INTRAVENOUS at 09:35

## 2022-08-19 RX ADMIN — SODIUM CHLORIDE, POTASSIUM CHLORIDE, SODIUM LACTATE AND CALCIUM CHLORIDE: 600; 310; 30; 20 INJECTION, SOLUTION INTRAVENOUS at 09:34

## 2022-08-19 NOTE — ANESTHESIA PREPROCEDURE EVALUATION
Anesthesia Evaluation     Patient summary reviewed and Nursing notes reviewed   no history of anesthetic complications:  NPO Solid Status: > 8 hours  NPO Liquid Status: > 2 hours           Airway   Mallampati: III  TM distance: >3 FB  Neck ROM: full  No difficulty expected  Dental      Pulmonary - negative pulmonary ROS and normal exam    breath sounds clear to auscultation  Cardiovascular - normal exam  Exercise tolerance: good (4-7 METS)    Rhythm: regular  Rate: normal    (+) hypertension, dysrhythmias Atrial Fib, CHF , hyperlipidemia,  carotid artery disease      Neuro/Psych  (+) numbness,    GI/Hepatic/Renal/Endo    (+)  GERD,  liver disease, renal disease,     Musculoskeletal     Abdominal    Substance History - negative use     OB/GYN negative ob/gyn ROS         Other   arthritis,      ROS/Med Hx Other: afib only during period of being septic with gallbladder no trouble since then           CONCLUSION:     1. No stenosis is present in the carotid bifurcations bilaterally.    2. There is mild to moderate mixed density non stenotic plaque bilaterally.    3. Vertebral flow is antegrade bilaterally.           ekg nsr lae             Anesthesia Plan    ASA 3     general     (Total IV Anesthesia    Patient understands anesthesia not responsible for dental damage.  )  intravenous induction     Anesthetic plan, risks, benefits, and alternatives have been provided, discussed and informed consent has been obtained with: patient.    Plan discussed with CRNA.        CODE STATUS:

## 2022-08-19 NOTE — ANESTHESIA POSTPROCEDURE EVALUATION
Patient: Paolo Goldstein    Procedure Summary     Date: 08/19/22 Room / Location: [facility] ENDOSCOPY 4 / [facility] ENDOSCOPY    Anesthesia Start: 0933 Anesthesia Stop: 1001    Procedure: COLONOSCOPY WITH POLYPECTOMY (N/A ) Diagnosis:       Family hx of colon cancer      (Family hx of colon cancer [Z80.0])    Surgeons: Janes Dodd MD Provider: Chandana Carolina MD    Anesthesia Type: general ASA Status: 3          Anesthesia Type: general    Vitals  Vitals Value Taken Time   /64 08/19/22 1021   Temp 36.2 °C (97.1 °F) 08/19/22 1015   Pulse 61 08/19/22 1022   Resp 20 08/19/22 1015   SpO2 95 % 08/19/22 1022   Vitals shown include unvalidated device data.        Post Anesthesia Care and Evaluation    Patient location during evaluation: bedside  Patient participation: complete - patient participated  Level of consciousness: awake  Pain score: 0  Pain management: adequate    Airway patency: patent  Anesthetic complications: No anesthetic complications  PONV Status: none  Cardiovascular status: acceptable and stable  Respiratory status: acceptable and room air  Hydration status: acceptable    Comments: An Anesthesiologist personally participated in the most demanding procedures (including induction and emergence if applicable) in the anesthesia plan, monitored the course of anesthesia administration at frequent intervals and remained physically present and available for immediate diagnosis and treatment of emergencies.

## 2022-08-19 NOTE — H&P
Pre Procedure History & Physical    Chief Complaint:   Family history colon cancer    Subjective     HPI:   Family history of colon cancer    Past Medical History:   Past Medical History:   Diagnosis Date   • A-fib (HCC)    • Acid reflux    • Arthritis    • Colon polyps    • Essential hypertension    • Family history of colon cancer    • Fatty liver    • GERD (gastroesophageal reflux disease)    • Heart disease    • High blood pressure    • Hip pain     LEFT    • Hyperlipemia    • Hypertension    • MGUS (monoclonal gammopathy of unknown significance)    • Pes cavus of left foot 01/12/2019   • Primary osteoarthritis of left foot 01/12/2019   • RUQ pain    • Sciatic nerve pain        Past Surgical History:  Past Surgical History:   Procedure Laterality Date   • ANKLE SURGERY Right 1977    RIGHT ANKLE CLOSED REDUCTION    • CARDIOVERSION     • CHOLECYSTECTOMY      LAPAROSCOPIC   • COLONOSCOPY  04/21/2017    DR. PICKARD   • ENDOSCOPY  08/09/2013       • HIP ARTHROPLASTY Left    • HIP BIPOLAR REPLACEMENT Left    • OTHER SURGICAL HISTORY      ARTIFICAL JOINTS/LIMBS    • OTHER SURGICAL HISTORY      JOINT SURGERY   • OTHER SURGICAL HISTORY      I & D for osteomylitis right foot       Family History:  Family History   Problem Relation Age of Onset   • Stroke Mother    • Arthritis Mother    • Prostate cancer Father 69   • Heart disease Father    • Colon cancer Paternal Grandmother 60   • Colon cancer Paternal Aunt    • Colon cancer Paternal Uncle        Social History:   reports that he has never smoked. He has never used smokeless tobacco. He reports current alcohol use. He reports that he does not use drugs.    Medications:   Medications Prior to Admission   Medication Sig Dispense Refill Last Dose   • allopurinol (ZYLOPRIM) 300 MG tablet TAKE 1 TABLET BY MOUTH DAILY 30 tablet 5 Past Week at Unknown time   • candesartan (ATACAND) 16 MG tablet Take  1 1/2 tablet daily (Patient taking differently: 24 mg Daily.) 135  tablet 3 8/19/2022 at Unknown time   • Loratadine 10 MG capsule As Needed.   Past Week at Unknown time   • metoprolol succinate XL (Toprol XL) 50 MG 24 hr tablet Take 1.5 tablets by mouth Daily. Take  1 1/2 tablets by mouth every night 135 tablet 3 8/18/2022 at Unknown time   • pantoprazole (PROTONIX) 40 MG EC tablet TAKE 1 TABLET BY MOUTH DAILY 30 tablet 5 Past Week at Unknown time   • rosuvastatin (CRESTOR) 10 MG tablet Take 1 tablet by mouth Daily. 90 tablet 3 Past Month at Unknown time   • Turmeric 1053 MG tablet turmeric 400 mg oral capsule take 1 capsule by oral route daily   Active   Past Week at Unknown time   • aspirin 81 MG EC tablet Aspir-81 81 mg oral tablet,delayed release (DR/EC) take 1 tablet (81 mg) by oral route once daily   Active   More than a month at Unknown time   • colestipol (COLESTID) 5 g granules Take 1 g by mouth.   More than a month at Unknown time       Allergies:  Patient has no known allergies.        Objective     Blood pressure 138/87, pulse 64, temperature 97.8 °F (36.6 °C), temperature source Temporal, resp. rate 18, weight 105 kg (232 lb 5.8 oz), SpO2 96 %.    Physical Exam   Constitutional: Pt is oriented to person, place, and time and well-developed, well-nourished, and in no distress.   Mouth/Throat: Oropharynx is clear and moist.   Neck: Normal range of motion.   Cardiovascular: Normal rate, regular rhythm and normal heart sounds.    Pulmonary/Chest: Effort normal and breath sounds normal.   Abdominal: Soft. Nontender  Skin: Skin is warm and dry.   Psychiatric: Mood, memory, affect and judgment normal.     Assessment & Plan     Diagnosis:  High risk screening    Anticipated Surgical Procedure:  Colonoscopy    The risks, benefits, and alternatives of this procedure have been discussed with the patient or the responsible party- the patient understands and agrees to proceed.

## 2022-08-22 LAB
CYTO UR: NORMAL
LAB AP CASE REPORT: NORMAL
LAB AP CLINICAL INFORMATION: NORMAL
PATH REPORT.FINAL DX SPEC: NORMAL
PATH REPORT.GROSS SPEC: NORMAL

## 2022-08-29 ENCOUNTER — LAB (OUTPATIENT)
Dept: LAB | Facility: HOSPITAL | Age: 62
End: 2022-08-29

## 2022-08-29 DIAGNOSIS — E78.2 MIXED HYPERLIPIDEMIA: ICD-10-CM

## 2022-08-29 DIAGNOSIS — M86.071 ACUTE HEMATOGENOUS OSTEOMYELITIS OF RIGHT FOOT: ICD-10-CM

## 2022-08-29 DIAGNOSIS — D47.2 MONOCLONAL GAMMOPATHY OF UNDETERMINED SIGNIFICANCE: ICD-10-CM

## 2022-08-29 DIAGNOSIS — I50.9 CONGESTIVE HEART FAILURE, UNSPECIFIED HF CHRONICITY, UNSPECIFIED HEART FAILURE TYPE: ICD-10-CM

## 2022-08-29 DIAGNOSIS — I10 HYPERTENSION, ESSENTIAL: ICD-10-CM

## 2022-08-29 DIAGNOSIS — N18.31 STAGE 3A CHRONIC KIDNEY DISEASE: ICD-10-CM

## 2022-08-29 DIAGNOSIS — M25.571 ACUTE RIGHT ANKLE PAIN: ICD-10-CM

## 2022-08-29 DIAGNOSIS — I65.29 STENOSIS OF CAROTID ARTERY, UNSPECIFIED LATERALITY: ICD-10-CM

## 2022-08-29 DIAGNOSIS — K59.1 FUNCTIONAL DIARRHEA: ICD-10-CM

## 2022-08-29 DIAGNOSIS — I48.0 PAROXYSMAL ATRIAL FIBRILLATION: ICD-10-CM

## 2022-08-29 DIAGNOSIS — M86.9 OSTEOMYELITIS OF ANKLE AND FOOT: ICD-10-CM

## 2022-08-29 DIAGNOSIS — R74.8 ELEVATED LIVER ENZYMES: ICD-10-CM

## 2022-08-29 DIAGNOSIS — D75.1 POLYCYTHEMIA: ICD-10-CM

## 2022-08-29 LAB
ALBUMIN SERPL-MCNC: 4 G/DL (ref 3.5–5.2)
ALBUMIN/GLOB SERPL: 1.2 G/DL
ALP SERPL-CCNC: 73 U/L (ref 39–117)
ALT SERPL W P-5'-P-CCNC: 24 U/L (ref 1–41)
ANION GAP SERPL CALCULATED.3IONS-SCNC: 7.7 MMOL/L (ref 5–15)
AST SERPL-CCNC: 20 U/L (ref 1–40)
BASOPHILS # BLD AUTO: 0.05 10*3/MM3 (ref 0–0.2)
BASOPHILS NFR BLD AUTO: 0.6 % (ref 0–1.5)
BILIRUB SERPL-MCNC: 0.5 MG/DL (ref 0–1.2)
BUN SERPL-MCNC: 24 MG/DL (ref 8–23)
BUN/CREAT SERPL: 16.1 (ref 7–25)
CALCIUM SPEC-SCNC: 9.4 MG/DL (ref 8.6–10.5)
CHLORIDE SERPL-SCNC: 108 MMOL/L (ref 98–107)
CHOLEST SERPL-MCNC: 211 MG/DL (ref 0–200)
CO2 SERPL-SCNC: 24.3 MMOL/L (ref 22–29)
CREAT SERPL-MCNC: 1.49 MG/DL (ref 0.76–1.27)
CRP SERPL-MCNC: 0.53 MG/DL (ref 0–0.5)
DEPRECATED RDW RBC AUTO: 43.5 FL (ref 37–54)
EGFRCR SERPLBLD CKD-EPI 2021: 52.7 ML/MIN/1.73
EOSINOPHIL # BLD AUTO: 0.36 10*3/MM3 (ref 0–0.4)
EOSINOPHIL NFR BLD AUTO: 4.6 % (ref 0.3–6.2)
ERYTHROCYTE [DISTWIDTH] IN BLOOD BY AUTOMATED COUNT: 12.7 % (ref 12.3–15.4)
ERYTHROCYTE [SEDIMENTATION RATE] IN BLOOD: 20 MM/HR (ref 0–20)
GLOBULIN UR ELPH-MCNC: 3.3 GM/DL
GLUCOSE SERPL-MCNC: 98 MG/DL (ref 65–99)
HBA1C MFR BLD: 5.6 % (ref 4.8–5.6)
HCT VFR BLD AUTO: 49.3 % (ref 37.5–51)
HDLC SERPL-MCNC: 32 MG/DL (ref 40–60)
HGB BLD-MCNC: 16.8 G/DL (ref 13–17.7)
IMM GRANULOCYTES # BLD AUTO: 0.04 10*3/MM3 (ref 0–0.05)
IMM GRANULOCYTES NFR BLD AUTO: 0.5 % (ref 0–0.5)
LDLC SERPL CALC-MCNC: 150 MG/DL (ref 0–100)
LDLC/HDLC SERPL: 4.59 {RATIO}
LYMPHOCYTES # BLD AUTO: 2.18 10*3/MM3 (ref 0.7–3.1)
LYMPHOCYTES NFR BLD AUTO: 28 % (ref 19.6–45.3)
MCH RBC QN AUTO: 32.4 PG (ref 26.6–33)
MCHC RBC AUTO-ENTMCNC: 34.1 G/DL (ref 31.5–35.7)
MCV RBC AUTO: 95 FL (ref 79–97)
MONOCYTES # BLD AUTO: 0.7 10*3/MM3 (ref 0.1–0.9)
MONOCYTES NFR BLD AUTO: 9 % (ref 5–12)
NEUTROPHILS NFR BLD AUTO: 4.45 10*3/MM3 (ref 1.7–7)
NEUTROPHILS NFR BLD AUTO: 57.3 % (ref 42.7–76)
NRBC BLD AUTO-RTO: 0 /100 WBC (ref 0–0.2)
PLATELET # BLD AUTO: 205 10*3/MM3 (ref 140–450)
PMV BLD AUTO: 11.2 FL (ref 6–12)
POTASSIUM SERPL-SCNC: 4.5 MMOL/L (ref 3.5–5.2)
PROT SERPL-MCNC: 7.3 G/DL (ref 6–8.5)
RBC # BLD AUTO: 5.19 10*6/MM3 (ref 4.14–5.8)
SODIUM SERPL-SCNC: 140 MMOL/L (ref 136–145)
TRIGL SERPL-MCNC: 161 MG/DL (ref 0–150)
VLDLC SERPL-MCNC: 29 MG/DL (ref 5–40)
WBC NRBC COR # BLD: 7.78 10*3/MM3 (ref 3.4–10.8)

## 2022-08-29 PROCEDURE — 85025 COMPLETE CBC W/AUTO DIFF WBC: CPT

## 2022-08-29 PROCEDURE — 83036 HEMOGLOBIN GLYCOSYLATED A1C: CPT

## 2022-08-29 PROCEDURE — 80053 COMPREHEN METABOLIC PANEL: CPT

## 2022-08-29 PROCEDURE — 85652 RBC SED RATE AUTOMATED: CPT

## 2022-08-29 PROCEDURE — 36415 COLL VENOUS BLD VENIPUNCTURE: CPT

## 2022-08-29 PROCEDURE — 86140 C-REACTIVE PROTEIN: CPT

## 2022-08-29 PROCEDURE — 80061 LIPID PANEL: CPT

## 2022-09-01 ENCOUNTER — TELEPHONE (OUTPATIENT)
Dept: CARDIOLOGY | Facility: CLINIC | Age: 62
End: 2022-09-01

## 2022-09-01 ENCOUNTER — OFFICE VISIT (OUTPATIENT)
Dept: INTERNAL MEDICINE | Facility: CLINIC | Age: 62
End: 2022-09-01

## 2022-09-01 VITALS
SYSTOLIC BLOOD PRESSURE: 118 MMHG | BODY MASS INDEX: 36.29 KG/M2 | DIASTOLIC BLOOD PRESSURE: 72 MMHG | HEART RATE: 71 BPM | WEIGHT: 231.2 LBS | OXYGEN SATURATION: 96 % | HEIGHT: 67 IN | TEMPERATURE: 98.1 F

## 2022-09-01 DIAGNOSIS — Z80.0 FAMILY HX OF COLON CANCER: ICD-10-CM

## 2022-09-01 DIAGNOSIS — M86.9 OSTEOMYELITIS OF ANKLE AND FOOT: Primary | ICD-10-CM

## 2022-09-01 DIAGNOSIS — D47.2 MONOCLONAL GAMMOPATHY OF UNDETERMINED SIGNIFICANCE: ICD-10-CM

## 2022-09-01 DIAGNOSIS — I10 HYPERTENSION, ESSENTIAL: ICD-10-CM

## 2022-09-01 DIAGNOSIS — I48.0 PAROXYSMAL ATRIAL FIBRILLATION: ICD-10-CM

## 2022-09-01 DIAGNOSIS — K59.1 FUNCTIONAL DIARRHEA: ICD-10-CM

## 2022-09-01 DIAGNOSIS — N18.31 STAGE 3A CHRONIC KIDNEY DISEASE: ICD-10-CM

## 2022-09-01 DIAGNOSIS — E78.2 MIXED HYPERLIPIDEMIA: ICD-10-CM

## 2022-09-01 DIAGNOSIS — Z12.5 SCREENING PSA (PROSTATE SPECIFIC ANTIGEN): ICD-10-CM

## 2022-09-01 DIAGNOSIS — D75.1 POLYCYTHEMIA: ICD-10-CM

## 2022-09-01 DIAGNOSIS — R74.8 ELEVATED LIVER ENZYMES: ICD-10-CM

## 2022-09-01 PROCEDURE — 99214 OFFICE O/P EST MOD 30 MIN: CPT | Performed by: INTERNAL MEDICINE

## 2022-09-01 RX ORDER — ROSUVASTATIN CALCIUM 10 MG/1
10 TABLET, COATED ORAL DAILY
Qty: 90 TABLET | Refills: 3 | Status: SHIPPED | OUTPATIENT
Start: 2022-09-01

## 2022-09-01 NOTE — TELEPHONE ENCOUNTER
Patient had stopped his rosuvastatin in June, patient was having bad leg cramps and pains. Saw Dr. Howe today and he restarted him back on Crestor 10 mg and he is to repeat labs in Dec.

## 2022-09-01 NOTE — PROGRESS NOTES
"Chief Complaint/ HPI: Patient is here to follow-up with history of CKD, lab work recently, elevated liver enzymes in the past history of osteomyelitis ankle injury which is improved, he is back to playing golf on a regular basis, he still working part-time, here to follow-up with blood sugar issues, had questions about getting a repeat ultrasound of the liver, he also had some adjustment to his blood pressure medications, there was some concern about his cholesterol medications causing myalgias arthralgias, he is back on that again,          Objective   Vital Signs  Vitals:    09/01/22 1427   BP: 118/72   Pulse: 71   Temp: 98.1 °F (36.7 °C)   SpO2: 96%   Weight: 105 kg (231 lb 3.2 oz)   Height: 170.2 cm (67.01\")      Body mass index is 36.2 kg/m².  Review of Systems   Constitutional: Negative.    HENT: Negative.    Eyes: Negative.    Respiratory: Negative.    Cardiovascular: Negative.    Gastrointestinal: Negative.    Endocrine: Negative.    Genitourinary: Negative.    Musculoskeletal: Negative.    Allergic/Immunologic: Negative.    Neurological: Negative.    Hematological: Negative.    Psychiatric/Behavioral: Negative.       Physical Exam  Constitutional:       General: He is not in acute distress.     Appearance: Normal appearance. He is obese.   HENT:      Head: Normocephalic.      Mouth/Throat:      Mouth: Mucous membranes are moist.   Eyes:      Conjunctiva/sclera: Conjunctivae normal.      Pupils: Pupils are equal, round, and reactive to light.   Cardiovascular:      Rate and Rhythm: Normal rate and regular rhythm.      Pulses: Normal pulses.      Heart sounds: Normal heart sounds.   Pulmonary:      Effort: Pulmonary effort is normal.      Breath sounds: Normal breath sounds.   Abdominal:      General: Bowel sounds are normal.      Palpations: Abdomen is soft.   Musculoskeletal:         General: No swelling. Normal range of motion.      Cervical back: Neck supple.   Skin:     General: Skin is warm and dry.     "  Coloration: Skin is not jaundiced.   Neurological:      General: No focal deficit present.      Mental Status: He is alert and oriented to person, place, and time. Mental status is at baseline.   Psychiatric:         Mood and Affect: Mood normal.         Behavior: Behavior normal.         Thought Content: Thought content normal.         Judgment: Judgment normal.        Result Review :   No results found for: PROBNP, BNP  CMP    CMP 3/9/22 6/9/22 8/29/22   Glucose 104 (A) 96 98   BUN 19 20 24 (A)   Creatinine 1.33 (A) 1.27 1.49 (A)   Sodium 140 138 140   Potassium 4.5 4.6 4.5   Chloride 103 105 108 (A)   Calcium 9.4 9.4 9.4   Albumin 3.90 3.90 4.00   Total Bilirubin 0.6 0.4 0.5   Alkaline Phosphatase 79 77 73   AST (SGOT) 19 17 20   ALT (SGPT) 28 22 24   (A) Abnormal value            CBC w/diff    CBC w/Diff 3/17/22 6/9/22 8/29/22   WBC  7.88 7.78   RBC  5.32 5.19   Hemoglobin 16.3 17.1 16.8   Hematocrit 47.7 50.0 49.3   MCV  94.0 95.0   MCH  32.1 32.4   MCHC  34.2 34.1   RDW  12.4 12.7   Platelets  179 205   Neutrophil Rel %  59.9 57.3   Immature Granulocyte Rel %  0.5 0.5   Lymphocyte Rel %  24.5 28.0   Monocyte Rel %  9.3 9.0   Eosinophil Rel %  4.9 4.6   Basophil Rel %  0.9 0.6            Lipid Panel    Lipid Panel 3/9/22 6/9/22 8/29/22   Total Cholesterol 128 130 211 (A)   Triglycerides 130 120 161 (A)   HDL Cholesterol 32 (A) 33 (A) 32 (A)   VLDL Cholesterol 23 22 29   LDL Cholesterol  73 75 150 (A)   LDL/HDL Ratio 2.19 2.21 4.59   (A) Abnormal value             Lab Results   Component Value Date    TSH 1.750 12/07/2021    TSH 2.780 09/13/2021    TSH 2.070 08/02/2019      Lab Results   Component Value Date    FREET4 1.09 12/07/2021    FREET4 0.9 05/04/2019      A1C Last 3 Results    HGBA1C Last 3 Results 8/29/22   Hemoglobin A1C 5.60            PSA    PSA 9/13/21 12/7/21   PSA 1.300 1.090                          Visit Diagnoses:    ICD-10-CM ICD-9-CM   1. Osteomyelitis of ankle and foot (HCC)  M86.9 730.27    2. Elevated liver enzymes  R74.8 790.5   3. Stage 3a chronic kidney disease (HCC)  N18.31 585.3   4. Paroxysmal atrial fibrillation (HCC)  I48.0 427.31   5. Monoclonal gammopathy of undetermined significance  D47.2 273.1   6. Hypertension, essential  I10 401.9   7. Polycythemia  D75.1 238.4   8. Mixed hyperlipidemia  E78.2 272.2   9. Functional diarrhea  K59.1 564.5   10. Family hx of colon cancer  Z80.0 V16.0   11. Screening PSA (prostate specific antigen)  Z12.5 V76.44       Assessment and Plan   Diagnoses and all orders for this visit:    1. Osteomyelitis of ankle and foot (HCC) (Primary)  -     US Liver; Future  -     Comprehensive Metabolic Panel; Future  -     Lipid Panel; Future  -     CBC & Differential; Future  -     Sedimentation Rate; Future  -     C-reactive Protein; Future  -     Uric Acid; Future  -     PSA Screen; Future    2. Elevated liver enzymes  -     US Liver; Future  -     Comprehensive Metabolic Panel; Future  -     Lipid Panel; Future  -     CBC & Differential; Future  -     Sedimentation Rate; Future  -     C-reactive Protein; Future  -     Uric Acid; Future  -     PSA Screen; Future    3. Stage 3a chronic kidney disease (HCC)  -     US Liver; Future  -     Comprehensive Metabolic Panel; Future  -     Lipid Panel; Future  -     CBC & Differential; Future  -     Sedimentation Rate; Future  -     C-reactive Protein; Future  -     Uric Acid; Future  -     PSA Screen; Future    4. Paroxysmal atrial fibrillation (HCC)  -     US Liver; Future  -     Comprehensive Metabolic Panel; Future  -     Lipid Panel; Future  -     CBC & Differential; Future  -     Sedimentation Rate; Future  -     C-reactive Protein; Future  -     Uric Acid; Future  -     PSA Screen; Future    5. Monoclonal gammopathy of undetermined significance  -     US Liver; Future  -     Comprehensive Metabolic Panel; Future  -     Lipid Panel; Future  -     CBC & Differential; Future  -     Sedimentation Rate; Future  -      C-reactive Protein; Future  -     Uric Acid; Future  -     PSA Screen; Future    6. Hypertension, essential  -     US Liver; Future  -     Comprehensive Metabolic Panel; Future  -     Lipid Panel; Future  -     CBC & Differential; Future  -     Sedimentation Rate; Future  -     C-reactive Protein; Future  -     Uric Acid; Future  -     PSA Screen; Future    7. Polycythemia  -     US Liver; Future  -     Comprehensive Metabolic Panel; Future  -     Lipid Panel; Future  -     CBC & Differential; Future  -     Sedimentation Rate; Future  -     C-reactive Protein; Future  -     Uric Acid; Future  -     PSA Screen; Future    8. Mixed hyperlipidemia  -     US Liver; Future  -     Comprehensive Metabolic Panel; Future  -     Lipid Panel; Future  -     CBC & Differential; Future  -     Sedimentation Rate; Future  -     C-reactive Protein; Future  -     Uric Acid; Future  -     rosuvastatin (CRESTOR) 10 MG tablet; Take 1 tablet by mouth Daily.  Dispense: 90 tablet; Refill: 3  -     PSA Screen; Future    9. Functional diarrhea  -     US Liver; Future  -     Comprehensive Metabolic Panel; Future  -     Lipid Panel; Future  -     CBC & Differential; Future  -     Sedimentation Rate; Future  -     C-reactive Protein; Future  -     Uric Acid; Future  -     PSA Screen; Future    10. Family hx of colon cancer  -     US Liver; Future  -     Comprehensive Metabolic Panel; Future  -     Lipid Panel; Future  -     CBC & Differential; Future  -     Sedimentation Rate; Future  -     C-reactive Protein; Future  -     Uric Acid; Future  -     PSA Screen; Future    11. Screening PSA (prostate specific antigen)  -     PSA Screen; Future    Elevated creatinine, CKD stage IIIa, up to 1.49, has fluctuated from 1.0-1.6 over the past year,, as of August 29, 2022 encourage patient drink plenty fluids avoid nephrotoxic medications if possible, limit alcohol use push water,    Osteomyelitis, right ankle, has finished up course of antibiotics  treatment options including continue doxycycline for 6 months versus following sed rates retreating if sed rates increase, discussed with patient December 9, 2021 --------------------patient had surgery for I&D and bone biopsy sept 1, 2021--- showing MRSA and gout into the right ankle and foot on September 1, 2021 by Dr. Turner at [facility] --- through [facility], had a PICC line placed, was put on IV antibiotics by infectious disease doctor and is seen, has been on vancomycin every 12 hours at home, for total of 6 weeks----patient has been released from orthopedics care as of January 2022,, sed rate CRPs both levels are undetectable or essentially 0 as of March 2022, will continue following sed rate CRP's, September 2022      Right ankle pain swelling ongoing for 5 weeks, July 2021,    issue is not resolving with several different rounds of medications including 2 rounds of steroids anti-inflammatories, as of August 2021, patient was and made appointment with orthopedic in [place] as above, had surgery September 1 biopsy is growing out MRSA and osteomyelitis now has PICC line with IV vancomycin for 6 weeks,     LAP CHOLEY, AND POST OP BILE LEAK/ CBD OBSTRUCTION, READMITTED,AND HAD ERCP WITH DR PICKARD--- DEVELOPED AFIB WITH [facility], SEPTIC , ELEVATED WBC, APRIL 2019, CARDIOVERTED, WAS RX WITH ABX, HAD MARIAN DRAIN PLACED BY RADIOLOGY-- AND THEN ANOTHER MARIAN DRAIN PLACED, --THORACENTISIS ALSO DONE TIMES 2, --HAD STENT REMOVED JUNE 2019, HAD STRESS TEST WITH [facility],, May 2019, no evidence of ischemia and normal myocardial SPECT perfusion study,-------CARDIAC EVENT MONITOR 8/2019, --NO AFIB ------- ON ELIQUIS -- AUG 2019, , Stopped eliquis October 2019    Gout, cont allopurinol 300 mg qd as aug 2021, current uric acid level 3.8 March 2022    VASC CALCIFICATIONS SEEN ON CAROTIDS ---ON PLAIN XRAY PER [facility] FOR BONE SURVERY ----- CAROTID U/S, MARCH 2021,, no stenosis present carotid bifurcations, mild to moderate  mixed density nonstenotic plaque bilateral, vertebral flow antegrade,, continue statin therapy if possible,    POLYCYTHEMIA, ----- recommend phlebotomy 500 cc of blood due to hematocrit , Discussed March 2021, patient will consider and we can set up for phlebotomy hematocrit 52, March 13, 2021,, stable hemoglobin 16.9 December 2021 ---will redo therapy plan for phlebotomy 500 cc of blood,    HTN--cont ATACAND 24 MG QD, METOPROLOL 50 QOD AND 75 QOD ,     ELEVATED CHOL.--patient had stopped Crestor recently due to myalgias muscle aches, felt much better, but has recently restarted it after seeing cardiology and seeing cholesterol numbers, has questions about medication September 1, 2022,    ELEVATED LFTS, KEENAN--NORMAL NOW DEC 2016, , April 2018, ultrasounds liver reviewed, October 2017 and March 2017, shows improvement in liver size,---Has had ultrasounds and fibrosis scan October 2019, slightly elevated score of 0.39 stage F1 to F2, moderate steatosis, has been followed by GI service, -- ultrasound March 5, 2021 shows normal echogenicity of the liver texture, no liver lesions, no biliary ductal dilation, otherwise unremarkable--we will do a follow-up ultrasound, September 2022 is a screening test,    MONOCLONAL PARAPROTEINURIA, DX IN 2015-16, NOW GOING TO [place] FOR F/U Total proteins have been stable over the last several years through [facility], in follow-up, no treatment at this time,===    OA, OF L HIP AND NICOLAS 12/13, KAREN    OVERWGT-continue weight loss efforts, start walking more exercise if he can    LVH ON EKG AND ECHO 2016    LAST CSCOPE 9/13 AND EGD , AND AUG 2017, NEERAJ colonoscopy August 19, 2022 tubular adenoma,                         Follow Up   Return in about 3 months (around 12/1/2022).  Patient was given instructions and counseling regarding his condition or for health maintenance advice. Please see specific information pulled into the AVS if appropriate.

## 2022-10-03 ENCOUNTER — TELEPHONE (OUTPATIENT)
Dept: INTERNAL MEDICINE | Facility: CLINIC | Age: 62
End: 2022-10-03

## 2022-10-03 NOTE — TELEPHONE ENCOUNTER
Call patient, tell him that the ultrasound of the liver did not show any findings at all, they did not see any fluid the, bile ducts look to be normal, no other significant findings reported, the right kidney appeared to be normal, and there were no lesions in the liver

## 2022-12-08 ENCOUNTER — LAB (OUTPATIENT)
Dept: LAB | Facility: HOSPITAL | Age: 62
End: 2022-12-08

## 2022-12-08 DIAGNOSIS — I10 HYPERTENSION, ESSENTIAL: ICD-10-CM

## 2022-12-08 DIAGNOSIS — M86.071 ACUTE HEMATOGENOUS OSTEOMYELITIS OF RIGHT FOOT: ICD-10-CM

## 2022-12-08 DIAGNOSIS — D47.2 MONOCLONAL GAMMOPATHY OF UNDETERMINED SIGNIFICANCE: ICD-10-CM

## 2022-12-08 DIAGNOSIS — I50.9 CONGESTIVE HEART FAILURE, UNSPECIFIED HF CHRONICITY, UNSPECIFIED HEART FAILURE TYPE: ICD-10-CM

## 2022-12-08 DIAGNOSIS — E78.2 MIXED HYPERLIPIDEMIA: ICD-10-CM

## 2022-12-08 DIAGNOSIS — R74.8 ELEVATED LIVER ENZYMES: ICD-10-CM

## 2022-12-08 DIAGNOSIS — N18.31 STAGE 3A CHRONIC KIDNEY DISEASE: ICD-10-CM

## 2022-12-08 DIAGNOSIS — M86.9 OSTEOMYELITIS OF ANKLE AND FOOT: ICD-10-CM

## 2022-12-08 DIAGNOSIS — I48.0 PAROXYSMAL ATRIAL FIBRILLATION: ICD-10-CM

## 2022-12-08 DIAGNOSIS — K59.1 FUNCTIONAL DIARRHEA: ICD-10-CM

## 2022-12-08 DIAGNOSIS — Z80.0 FAMILY HX OF COLON CANCER: ICD-10-CM

## 2022-12-08 DIAGNOSIS — D75.1 POLYCYTHEMIA: ICD-10-CM

## 2022-12-08 DIAGNOSIS — M25.571 ACUTE RIGHT ANKLE PAIN: ICD-10-CM

## 2022-12-08 DIAGNOSIS — I65.29 STENOSIS OF CAROTID ARTERY, UNSPECIFIED LATERALITY: ICD-10-CM

## 2022-12-08 DIAGNOSIS — Z12.5 SCREENING PSA (PROSTATE SPECIFIC ANTIGEN): ICD-10-CM

## 2022-12-08 LAB
ALBUMIN SERPL-MCNC: 4.3 G/DL (ref 3.5–5.2)
ALBUMIN/GLOB SERPL: 1.4 G/DL
ALP SERPL-CCNC: 83 U/L (ref 39–117)
ALT SERPL W P-5'-P-CCNC: 21 U/L (ref 1–41)
ANION GAP SERPL CALCULATED.3IONS-SCNC: 8 MMOL/L (ref 5–15)
AST SERPL-CCNC: 22 U/L (ref 1–40)
BASOPHILS # BLD AUTO: 0.05 10*3/MM3 (ref 0–0.2)
BASOPHILS NFR BLD AUTO: 0.6 % (ref 0–1.5)
BILIRUB SERPL-MCNC: 0.6 MG/DL (ref 0–1.2)
BUN SERPL-MCNC: 16 MG/DL (ref 8–23)
BUN/CREAT SERPL: 13.1 (ref 7–25)
CALCIUM SPEC-SCNC: 9.8 MG/DL (ref 8.6–10.5)
CHLORIDE SERPL-SCNC: 97 MMOL/L (ref 98–107)
CHOLEST SERPL-MCNC: 142 MG/DL (ref 0–200)
CO2 SERPL-SCNC: 30 MMOL/L (ref 22–29)
CREAT SERPL-MCNC: 1.22 MG/DL (ref 0.76–1.27)
CRP SERPL-MCNC: 0.47 MG/DL (ref 0–0.5)
DEPRECATED RDW RBC AUTO: 41.3 FL (ref 37–54)
EGFRCR SERPLBLD CKD-EPI 2021: 67 ML/MIN/1.73
EOSINOPHIL # BLD AUTO: 0.41 10*3/MM3 (ref 0–0.4)
EOSINOPHIL NFR BLD AUTO: 4.7 % (ref 0.3–6.2)
ERYTHROCYTE [DISTWIDTH] IN BLOOD BY AUTOMATED COUNT: 12.1 % (ref 12.3–15.4)
ERYTHROCYTE [SEDIMENTATION RATE] IN BLOOD: 3 MM/HR (ref 0–20)
GLOBULIN UR ELPH-MCNC: 3.1 GM/DL
GLUCOSE SERPL-MCNC: 95 MG/DL (ref 65–99)
HCT VFR BLD AUTO: 50.9 % (ref 37.5–51)
HDLC SERPL-MCNC: 34 MG/DL (ref 40–60)
HGB BLD-MCNC: 17.5 G/DL (ref 13–17.7)
IMM GRANULOCYTES # BLD AUTO: 0.04 10*3/MM3 (ref 0–0.05)
IMM GRANULOCYTES NFR BLD AUTO: 0.5 % (ref 0–0.5)
LDLC SERPL CALC-MCNC: 91 MG/DL (ref 0–100)
LDLC/HDLC SERPL: 2.66 {RATIO}
LYMPHOCYTES # BLD AUTO: 2.04 10*3/MM3 (ref 0.7–3.1)
LYMPHOCYTES NFR BLD AUTO: 23.4 % (ref 19.6–45.3)
MCH RBC QN AUTO: 32.4 PG (ref 26.6–33)
MCHC RBC AUTO-ENTMCNC: 34.4 G/DL (ref 31.5–35.7)
MCV RBC AUTO: 94.3 FL (ref 79–97)
MONOCYTES # BLD AUTO: 0.79 10*3/MM3 (ref 0.1–0.9)
MONOCYTES NFR BLD AUTO: 9.1 % (ref 5–12)
NEUTROPHILS NFR BLD AUTO: 5.39 10*3/MM3 (ref 1.7–7)
NEUTROPHILS NFR BLD AUTO: 61.7 % (ref 42.7–76)
NRBC BLD AUTO-RTO: 0 /100 WBC (ref 0–0.2)
PLATELET # BLD AUTO: 197 10*3/MM3 (ref 140–450)
PMV BLD AUTO: 10.8 FL (ref 6–12)
POTASSIUM SERPL-SCNC: 4.6 MMOL/L (ref 3.5–5.2)
PROT SERPL-MCNC: 7.4 G/DL (ref 6–8.5)
PSA SERPL-MCNC: 1.57 NG/ML (ref 0–4)
RBC # BLD AUTO: 5.4 10*6/MM3 (ref 4.14–5.8)
SODIUM SERPL-SCNC: 135 MMOL/L (ref 136–145)
TRIGL SERPL-MCNC: 88 MG/DL (ref 0–150)
URATE SERPL-MCNC: 4.9 MG/DL (ref 3.4–7)
VLDLC SERPL-MCNC: 17 MG/DL (ref 5–40)
WBC NRBC COR # BLD: 8.72 10*3/MM3 (ref 3.4–10.8)

## 2022-12-08 PROCEDURE — G0103 PSA SCREENING: HCPCS

## 2022-12-08 PROCEDURE — 36415 COLL VENOUS BLD VENIPUNCTURE: CPT

## 2022-12-08 PROCEDURE — 85025 COMPLETE CBC W/AUTO DIFF WBC: CPT

## 2022-12-08 PROCEDURE — 84550 ASSAY OF BLOOD/URIC ACID: CPT

## 2022-12-08 PROCEDURE — 85652 RBC SED RATE AUTOMATED: CPT

## 2022-12-08 PROCEDURE — 86140 C-REACTIVE PROTEIN: CPT

## 2022-12-08 PROCEDURE — 80061 LIPID PANEL: CPT

## 2022-12-08 PROCEDURE — 80053 COMPREHEN METABOLIC PANEL: CPT

## 2022-12-09 ENCOUNTER — TELEPHONE (OUTPATIENT)
Dept: CARDIOLOGY | Facility: CLINIC | Age: 62
End: 2022-12-09

## 2022-12-09 NOTE — TELEPHONE ENCOUNTER
----- Message from LON Rose sent at 12/9/2022  6:56 AM EST -----  Notify pt labs look good, continue current meds

## 2022-12-15 ENCOUNTER — OFFICE VISIT (OUTPATIENT)
Dept: INTERNAL MEDICINE | Facility: CLINIC | Age: 62
End: 2022-12-15

## 2022-12-15 VITALS
WEIGHT: 242.6 LBS | BODY MASS INDEX: 38.08 KG/M2 | SYSTOLIC BLOOD PRESSURE: 142 MMHG | TEMPERATURE: 97.2 F | DIASTOLIC BLOOD PRESSURE: 86 MMHG | OXYGEN SATURATION: 95 % | HEART RATE: 70 BPM | HEIGHT: 67 IN

## 2022-12-15 DIAGNOSIS — I48.0 PAROXYSMAL ATRIAL FIBRILLATION: ICD-10-CM

## 2022-12-15 DIAGNOSIS — I10 HYPERTENSION, ESSENTIAL: ICD-10-CM

## 2022-12-15 DIAGNOSIS — K59.1 FUNCTIONAL DIARRHEA: ICD-10-CM

## 2022-12-15 DIAGNOSIS — M86.9 OSTEOMYELITIS OF ANKLE AND FOOT: Primary | ICD-10-CM

## 2022-12-15 DIAGNOSIS — R74.8 ELEVATED LIVER ENZYMES: ICD-10-CM

## 2022-12-15 DIAGNOSIS — D75.1 POLYCYTHEMIA: ICD-10-CM

## 2022-12-15 DIAGNOSIS — I65.29 STENOSIS OF CAROTID ARTERY, UNSPECIFIED LATERALITY: ICD-10-CM

## 2022-12-15 DIAGNOSIS — D47.2 MONOCLONAL GAMMOPATHY OF UNDETERMINED SIGNIFICANCE: ICD-10-CM

## 2022-12-15 DIAGNOSIS — N18.31 STAGE 3A CHRONIC KIDNEY DISEASE: ICD-10-CM

## 2022-12-15 DIAGNOSIS — I50.9 CONGESTIVE HEART FAILURE, UNSPECIFIED HF CHRONICITY, UNSPECIFIED HEART FAILURE TYPE: ICD-10-CM

## 2022-12-15 DIAGNOSIS — E78.2 MIXED HYPERLIPIDEMIA: ICD-10-CM

## 2022-12-15 PROCEDURE — 99214 OFFICE O/P EST MOD 30 MIN: CPT | Performed by: INTERNAL MEDICINE

## 2022-12-15 NOTE — PROGRESS NOTES
"CHIEF COMPLAINT:  Hyperlipidemia and Follow-up (Patient here for routine follow up )      HPI:    follow-up with history of CKD, lab work recently, elevated liver enzymes in the past history of osteomyelitis ankle injury which is improved, he is back to playing golf on a regular basis, he still working part-time, here to follow-up with blood sugar issues, had questions about getting a repeat ultrasound of the liver, he also had some adjustment to his blood pressure medications, there was some concern about his cholesterol medications causing myalgias arthralgias, he is back on that again,      Objective   Vital Signs  Vitals:    12/15/22 1429   BP: 142/86   Pulse: 70   Temp: 97.2 °F (36.2 °C)   SpO2: 95%   Weight: 110 kg (242 lb 9.6 oz)   Height: 170.2 cm (67.01\")      Body mass index is 37.99 kg/m².  Review of Systems   Constitutional: Negative.    HENT: Negative.    Eyes: Negative.    Respiratory: Negative.    Cardiovascular: Negative.    Gastrointestinal: Negative.    Endocrine: Negative.    Genitourinary: Negative.    Musculoskeletal: Negative.    Allergic/Immunologic: Negative.    Neurological: Negative.    Hematological: Negative.    Psychiatric/Behavioral: Negative.       Physical Exam  Constitutional:       General: He is not in acute distress.     Appearance: Normal appearance.   HENT:      Head: Normocephalic.      Mouth/Throat:      Mouth: Mucous membranes are moist.   Eyes:      Conjunctiva/sclera: Conjunctivae normal.      Pupils: Pupils are equal, round, and reactive to light.   Cardiovascular:      Rate and Rhythm: Normal rate and regular rhythm.      Pulses: Normal pulses.      Heart sounds: Normal heart sounds.   Pulmonary:      Effort: Pulmonary effort is normal.      Breath sounds: Normal breath sounds.   Abdominal:      General: Abdomen is flat. Bowel sounds are normal.      Palpations: Abdomen is soft.   Musculoskeletal:         General: No swelling. Normal range of motion.      Cervical back: " Neck supple.   Skin:     General: Skin is warm and dry.      Coloration: Skin is not jaundiced.   Neurological:      General: No focal deficit present.      Mental Status: He is alert and oriented to person, place, and time. Mental status is at baseline.   Psychiatric:         Mood and Affect: Mood normal.         Behavior: Behavior normal.         Thought Content: Thought content normal.         Judgment: Judgment normal.        Result Review :   No results found for: PROBNP, BNP  CMP    CMP 6/9/22 8/29/22 12/8/22   Glucose 96 98 95   BUN 20 24 (A) 16   Creatinine 1.27 1.49 (A) 1.22   Sodium 138 140 135 (A)   Potassium 4.6 4.5 4.6   Chloride 105 108 (A) 97 (A)   Calcium 9.4 9.4 9.8   Albumin 3.90 4.00 4.30   Total Bilirubin 0.4 0.5 0.6   Alkaline Phosphatase 77 73 83   AST (SGOT) 17 20 22   ALT (SGPT) 22 24 21   (A) Abnormal value            CBC w/diff    CBC w/Diff 6/9/22 8/29/22 12/8/22   WBC 7.88 7.78 8.72   RBC 5.32 5.19 5.40   Hemoglobin 17.1 16.8 17.5   Hematocrit 50.0 49.3 50.9   MCV 94.0 95.0 94.3   MCH 32.1 32.4 32.4   MCHC 34.2 34.1 34.4   RDW 12.4 12.7 12.1 (A)   Platelets 179 205 197   Neutrophil Rel % 59.9 57.3 61.7   Immature Granulocyte Rel % 0.5 0.5 0.5   Lymphocyte Rel % 24.5 28.0 23.4   Monocyte Rel % 9.3 9.0 9.1   Eosinophil Rel % 4.9 4.6 4.7   Basophil Rel % 0.9 0.6 0.6   (A) Abnormal value             Lipid Panel    Lipid Panel 6/9/22 8/29/22 12/8/22   Total Cholesterol 130 211 (A) 142   Triglycerides 120 161 (A) 88   HDL Cholesterol 33 (A) 32 (A) 34 (A)   VLDL Cholesterol 22 29 17   LDL Cholesterol  75 150 (A) 91   LDL/HDL Ratio 2.21 4.59 2.66   (A) Abnormal value             Lab Results   Component Value Date    TSH 1.750 12/07/2021    TSH 2.780 09/13/2021    TSH 2.070 08/02/2019      Lab Results   Component Value Date    FREET4 1.09 12/07/2021    FREET4 0.9 05/04/2019      A1C Last 3 Results    HGBA1C Last 3 Results 8/29/22   Hemoglobin A1C 5.60            PSA    PSA 12/8/22   PSA 1.570                           Visit Diagnoses:    ICD-10-CM ICD-9-CM   1. Osteomyelitis of ankle and foot (HCC)  M86.9 730.27   2. Elevated liver enzymes  R74.8 790.5   3. Stage 3a chronic kidney disease (HCC)  N18.31 585.3   4. Paroxysmal atrial fibrillation (HCC)  I48.0 427.31   5. Monoclonal gammopathy of undetermined significance  D47.2 273.1   6. Functional diarrhea  K59.1 564.5   7. Hypertension, essential  I10 401.9   8. Polycythemia  D75.1 238.4   9. Congestive heart failure, unspecified HF chronicity, unspecified heart failure type (HCC)  I50.9 428.0   10. Mixed hyperlipidemia  E78.2 272.2   11. Stenosis of carotid artery, unspecified laterality  I65.29 433.10       Assessment and Plan   Diagnoses and all orders for this visit:    1. Osteomyelitis of ankle and foot (HCC) (Primary)  -     Hemoglobin A1c; Future  -     Comprehensive Metabolic Panel; Future  -     CBC & Differential; Future  -     Lipid Panel; Future  -     Uric Acid; Future  -     Sedimentation Rate; Future    2. Elevated liver enzymes  -     Hemoglobin A1c; Future  -     Comprehensive Metabolic Panel; Future  -     CBC & Differential; Future  -     Lipid Panel; Future  -     Uric Acid; Future  -     Sedimentation Rate; Future    3. Stage 3a chronic kidney disease (HCC)  -     Hemoglobin A1c; Future  -     Comprehensive Metabolic Panel; Future  -     CBC & Differential; Future  -     Lipid Panel; Future  -     Uric Acid; Future  -     Sedimentation Rate; Future    4. Paroxysmal atrial fibrillation (HCC)  -     Hemoglobin A1c; Future  -     Comprehensive Metabolic Panel; Future  -     CBC & Differential; Future  -     Lipid Panel; Future  -     Uric Acid; Future  -     Sedimentation Rate; Future    5. Monoclonal gammopathy of undetermined significance  -     Hemoglobin A1c; Future  -     Comprehensive Metabolic Panel; Future  -     CBC & Differential; Future  -     Lipid Panel; Future  -     Uric Acid; Future  -     Sedimentation Rate; Future    6.  Functional diarrhea  -     Hemoglobin A1c; Future  -     Comprehensive Metabolic Panel; Future  -     CBC & Differential; Future  -     Lipid Panel; Future  -     Uric Acid; Future  -     Sedimentation Rate; Future    7. Hypertension, essential  -     Hemoglobin A1c; Future  -     Comprehensive Metabolic Panel; Future  -     CBC & Differential; Future  -     Lipid Panel; Future  -     Uric Acid; Future  -     Sedimentation Rate; Future    8. Polycythemia  -     Hemoglobin A1c; Future  -     Comprehensive Metabolic Panel; Future  -     CBC & Differential; Future  -     Lipid Panel; Future  -     Uric Acid; Future  -     Sedimentation Rate; Future    9. Congestive heart failure, unspecified HF chronicity, unspecified heart failure type (HCC)  -     Hemoglobin A1c; Future  -     Comprehensive Metabolic Panel; Future  -     CBC & Differential; Future  -     Lipid Panel; Future  -     Uric Acid; Future  -     Sedimentation Rate; Future    10. Mixed hyperlipidemia  -     Hemoglobin A1c; Future  -     Comprehensive Metabolic Panel; Future  -     CBC & Differential; Future  -     Lipid Panel; Future  -     Uric Acid; Future  -     Sedimentation Rate; Future    11. Stenosis of carotid artery, unspecified laterality  -     Hemoglobin A1c; Future  -     Comprehensive Metabolic Panel; Future  -     CBC & Differential; Future  -     Lipid Panel; Future  -     Uric Acid; Future  -     Sedimentation Rate; Future    Elevated creatinine, CKD stage IIIa, up to 1.49,, August 2022 improved down to 1.2 December 8, 2022,    Osteomyelitis, right ankle, has finished up course of antibiotics treatment options including continue doxycycline for 6 months versus following sed rates retreating if sed rates increase, discussed with patient December 9, 2021 --------------------patient had surgery for I&D and bone biopsy sept 1, 2021--- showing MRSA and gout into the right ankle and foot on September 1, 2021 by Dr. Turner [facility] --- through [facility], had a PICC line placed, was put on IV antibiotics by infectious disease doctor and is seen, has been on vancomycin every 12 hours at home, for total of 6 weeks----patient has been released from orthopedics care as of January 2022,, sed rate CRPs both levels are undetectable or essentially 0 as of March 2022, will continue following sed rate CRP's, September 2022, has been back to orthopedics and is currently stable x-rays were repeated--November 2022    Impaired fasting glucose we will follow hemoglobin A1c's periodically, discussed December 2022    LAP KELSEY, AND POST OP BILE LEAK/ CBD OBSTRUCTION, READMITTED,AND HAD ERCP WITH DR PICKARD--- DEVELOPED AFIB WITH RVR, SEPTIC , ELEVATED WBC, APRIL 2019, CARDIOVERTED, WAS RX WITH ABX, HAD MARIAN DRAIN PLACED BY RADIOLOGY-- AND THEN ANOTHER MARIAN DRAIN PLACED, --THORACENTISIS ALSO DONE TIMES 2, --HAD STENT REMOVED JUNE 2019, HAD STRESS TEST WITH [facility],, May 2019, no evidence of ischemia and normal myocardial SPECT perfusion study,-------CARDIAC EVENT MONITOR 8/2019, --NO AFIB ------- ON ELIQUIS -- AUG 2019, , Stopped eliquis October 2019    Gout, cont allopurinol 300 mg qd as aug 2021, current uric acid level 4.9 December 2022,    VASC CALCIFICATIONS SEEN ON CAROTIDS ---ON PLAIN XRAY PER [facility] FOR BONE SURVERY ----- CAROTID U/S, MARCH 2021,, no stenosis present carotid bifurcations, mild to moderate mixed density nonstenotic plaque bilateral, vertebral flow antegrade,, continue statin therapy if possible,    POLYCYTHEMIA, ----- recommend phlebotomy 500 cc of blood due to hematocrit --still considering it, he had an issue with being presyncope vasovagal when he was going to get it phlebotomized at last visit    PSA 1.5 December 8, 2022    HTN--cont ATACAND 24 MG QD, METOPROLOL 50 QOD AND 75 QOD ,     ELEVATED CHOL.--continues Qunol, Crestor, 10 mg daily    ELEVATED LFTS, KEENAN--NORMAL NOW DEC 2016, , April 2018, ultrasounds liver reviewed,  October 2017 and March 2017, shows improvement in liver size,---Has had ultrasounds and fibrosis scan October 2019, slightly elevated score of 0.39 stage F1 to F2, moderate steatosis, has been followed by GI service, -- ultrasound March 5, 2021 shows normal echogenicity of the liver texture, no liver lesions, no biliary ductal dilation, otherwise unremarkable--ultrasound of the liver September 1, 2022 shows liver size at 16.1 cm otherwise unremarkable scan,    MONOCLONAL PARAPROTEINURIA, DX IN 2015-16, NOW GOING TO [facility] FOR F/U Total proteins have been stable over the last several years through [facility], in follow-up, no treatment at this time,===    OA, OF L HIP AND NICOLAS 12/13, KAREN    OVERWGT-continue weight loss efforts, start walking more exercise if he can, December 15, 2022    LVH ON EKG AND ECHO 2016    LAST CSCOPE 9/13 AND EGD , AND AUG 2017, NEERAJ colonoscopy August 19, 2022 tubular adenoma,     Follow Up   No follow-ups on file.  Patient was given instructions and counseling regarding his condition or for health maintenance advice. Please see specific information pulled into the AVS if appropriate.

## 2023-01-06 ENCOUNTER — TELEPHONE (OUTPATIENT)
Dept: INTERNAL MEDICINE | Facility: CLINIC | Age: 63
End: 2023-01-06
Payer: COMMERCIAL

## 2023-01-06 RX ORDER — ALLOPURINOL 300 MG/1
300 TABLET ORAL DAILY
Qty: 30 TABLET | Refills: 5 | Status: SHIPPED | OUTPATIENT
Start: 2023-01-06

## 2023-01-06 NOTE — TELEPHONE ENCOUNTER
Caller: Paolo Goldstein    Relationship: Self    Best call back number: [phone]    Requested Prescriptions:   Requested Prescriptions     Pending Prescriptions Disp Refills   • allopurinol (ZYLOPRIM) 300 MG tablet 30 tablet 5     Sig: Take 1 tablet by mouth Daily.        Pharmacy where request should be sent: [id number] - [place], [place] - [address] AT [facility] [facility] [address] - [phone] [facility] [phone] FX     Does the patient have less than a 3 day supply:  [x] Yes  [] No    Would you like a call back once the refill request has been completed: [] Yes [x] No    If the office needs to give you a call back, can they leave a voicemail: [] Yes [x] No    Faviola Aguiar Rep   01/06/23 10:45 EST

## 2023-02-15 ENCOUNTER — OFFICE VISIT (OUTPATIENT)
Dept: CARDIOLOGY | Facility: CLINIC | Age: 63
End: 2023-02-15
Payer: COMMERCIAL

## 2023-02-15 VITALS
HEIGHT: 67 IN | BODY MASS INDEX: 37.35 KG/M2 | HEART RATE: 61 BPM | WEIGHT: 238 LBS | SYSTOLIC BLOOD PRESSURE: 126 MMHG | DIASTOLIC BLOOD PRESSURE: 78 MMHG

## 2023-02-15 DIAGNOSIS — E78.2 MIXED HYPERLIPIDEMIA: ICD-10-CM

## 2023-02-15 DIAGNOSIS — I48.0 PAROXYSMAL ATRIAL FIBRILLATION: ICD-10-CM

## 2023-02-15 DIAGNOSIS — I65.29 STENOSIS OF CAROTID ARTERY, UNSPECIFIED LATERALITY: ICD-10-CM

## 2023-02-15 DIAGNOSIS — I10 HYPERTENSION, ESSENTIAL: Primary | ICD-10-CM

## 2023-02-15 DIAGNOSIS — R06.09 DOE (DYSPNEA ON EXERTION): ICD-10-CM

## 2023-02-15 PROCEDURE — 99214 OFFICE O/P EST MOD 30 MIN: CPT | Performed by: INTERNAL MEDICINE

## 2023-02-15 RX ORDER — CHLORAL HYDRATE 500 MG
1000 CAPSULE ORAL
COMMUNITY

## 2023-02-15 RX ORDER — METOPROLOL SUCCINATE 50 MG/1
75 TABLET, EXTENDED RELEASE ORAL DAILY
Qty: 135 TABLET | Refills: 3 | Status: SHIPPED | OUTPATIENT
Start: 2023-02-15

## 2023-02-15 RX ORDER — MELATONIN
1000 DAILY
COMMUNITY

## 2023-02-15 NOTE — PROGRESS NOTES
Chief Complaint  Atrial Fibrillation, Follow-up, Hypertension, and Hyperlipidemia    Subjective    Patient has been doing well has had no tachycardic issues.  He did have one episode that occurred about a month ago when he was in the shower got overheated short of breath and blood pressure was hypertensive.  Was seen in the emergency room at that point his blood pressure had improved work-up there was unremarkable.  He is noted though since then some increased dyspnea on exertion symptoms    Past Medical History:   Diagnosis Date   • A-fib (HCC)    • Acid reflux    • Arthritis    • Colon polyps    • Essential hypertension    • Family history of colon cancer    • Fatty liver    • GERD (gastroesophageal reflux disease)    • Heart disease    • High blood pressure    • Hip pain     LEFT    • Hyperlipemia    • Hypertension    • MGUS (monoclonal gammopathy of unknown significance)    • Pes cavus of left foot 01/12/2019   • Primary osteoarthritis of left foot 01/12/2019   • RUQ pain    • Sciatic nerve pain          Current Outpatient Medications:   •  allopurinol (ZYLOPRIM) 300 MG tablet, Take 1 tablet by mouth Daily., Disp: 30 tablet, Rfl: 5  •  aspirin 81 MG EC tablet, Aspir-81 81 mg oral tablet,delayed release (DR/EC) take 1 tablet (81 mg) by oral route once daily   Active, Disp: , Rfl:   •  candesartan (ATACAND) 16 MG tablet, Take  1 1/2 tablet daily (Patient taking differently: 24 mg Daily.), Disp: 135 tablet, Rfl: 3  •  cholecalciferol (VITAMIN D3) 25 MCG (1000 UT) tablet, Take 1,000 Units by mouth Daily., Disp: , Rfl:   •  colestipol (COLESTID) 5 g granules, Take 1 g by mouth., Disp: , Rfl:   •  Loratadine 10 MG capsule, As Needed., Disp: , Rfl:   •  metoprolol succinate XL (Toprol XL) 50 MG 24 hr tablet, Take 1.5 tablets by mouth Daily. Take  1 1/2 tablets by mouth every night, Disp: 135 tablet, Rfl: 3  •  Omega-3 Fatty Acids (fish oil) 1000 MG capsule capsule, Take 1,000 mg by mouth Daily With Breakfast., Disp: ,  "Rfl:   •  pantoprazole (PROTONIX) 40 MG EC tablet, TAKE 1 TABLET BY MOUTH DAILY, Disp: 30 tablet, Rfl: 5  •  rosuvastatin (CRESTOR) 10 MG tablet, Take 1 tablet by mouth Daily., Disp: 90 tablet, Rfl: 3  •  Turmeric 1053 MG tablet, turmeric 400 mg oral capsule take 1 capsule by oral route daily   Active, Disp: , Rfl:     Medications Discontinued During This Encounter   Medication Reason   • metoprolol succinate XL (Toprol XL) 50 MG 24 hr tablet Reorder     No Known Allergies     Social History     Tobacco Use   • Smoking status: Never   • Smokeless tobacco: Never   Vaping Use   • Vaping Use: Never used   Substance Use Topics   • Alcohol use: Yes     Comment: occasionally   • Drug use: Never       Family History   Problem Relation Age of Onset   • Stroke Mother    • Arthritis Mother    • Prostate cancer Father 69   • Heart disease Father    • Colon cancer Paternal Grandmother 60   • Colon cancer Paternal Aunt    • Colon cancer Paternal Uncle         Objective     /78   Pulse 61   Ht 170.2 cm (67.01\")   Wt 108 kg (238 lb)   BMI 37.26 kg/m²       Physical Exam    General Appearance:   · no acute distress  · Alert and oriented x3  HENT:   · lips not cyanotic  · Atraumatic  Neck:  · No jvd   · supple  Respiratory:  · no respiratory distress  · normal breath sounds  · no rales  Cardiovascular:  · Regular rate and rhythm  · no S3, no S4   · no murmur  · no rub  Extremities  · No cyanosis  · lower extremity edema: none    Skin:   · warm, dry  · No rashes      Result Review :     No results found for: PROBNP  CMP    CMP 6/9/22 8/29/22 12/8/22   Glucose 96 98 95   BUN 20 24 (A) 16   Creatinine 1.27 1.49 (A) 1.22   eGFR 64.3 52.7 (A) 67.0   Sodium 138 140 135 (A)   Potassium 4.6 4.5 4.6   Chloride 105 108 (A) 97 (A)   Calcium 9.4 9.4 9.8   Total Protein 6.8 7.3 7.4   Albumin 3.90 4.00 4.30   Globulin 2.9 3.3 3.1   Total Bilirubin 0.4 0.5 0.6   Alkaline Phosphatase 77 73 83   AST (SGOT) 17 20 22   ALT (SGPT) 22 24 21 "   Albumin/Globulin Ratio 1.3 1.2 1.4   BUN/Creatinine Ratio 15.7 16.1 13.1   Anion Gap 11.2 7.7 8.0   (A) Abnormal value       Comments are available for some flowsheets but are not being displayed.           CBC w/diff    CBC w/Diff 6/9/22 8/29/22 12/8/22   WBC 7.88 7.78 8.72   RBC 5.32 5.19 5.40   Hemoglobin 17.1 16.8 17.5   Hematocrit 50.0 49.3 50.9   MCV 94.0 95.0 94.3   MCH 32.1 32.4 32.4   MCHC 34.2 34.1 34.4   RDW 12.4 12.7 12.1 (A)   Platelets 179 205 197   Neutrophil Rel % 59.9 57.3 61.7   Immature Granulocyte Rel % 0.5 0.5 0.5   Lymphocyte Rel % 24.5 28.0 23.4   Monocyte Rel % 9.3 9.0 9.1   Eosinophil Rel % 4.9 4.6 4.7   Basophil Rel % 0.9 0.6 0.6   (A) Abnormal value             Lab Results   Component Value Date    TSH 1.750 12/07/2021      Lab Results   Component Value Date    FREET4 1.09 12/07/2021      No results found for: DDIMERQUANT  Magnesium   Date Value Ref Range Status   06/09/2022 2.0 1.6 - 2.4 mg/dL Final      No results found for: DIGOXIN   Lab Results   Component Value Date    TROPONINT <0.01 05/05/2019           Lipid Panel    Lipid Panel 6/9/22 8/29/22 12/8/22   Total Cholesterol 130 211 (A) 142   Triglycerides 120 161 (A) 88   HDL Cholesterol 33 (A) 32 (A) 34 (A)   VLDL Cholesterol 22 29 17   LDL Cholesterol  75 150 (A) 91   LDL/HDL Ratio 2.21 4.59 2.66   (A) Abnormal value            No results found for: POCTROP                   Diagnoses and all orders for this visit:    1. Hypertension, essential (Primary)  -     metoprolol succinate XL (Toprol XL) 50 MG 24 hr tablet; Take 1.5 tablets by mouth Daily. Take  1 1/2 tablets by mouth every night  Dispense: 135 tablet; Refill: 3    2. Paroxysmal atrial fibrillation (HCC)  Assessment & Plan:  Patient with no symptomatic tachycardic recurrent episodes continue his Toprol 50 mg daily.  In addition patient is on aspirin only 81 daily for CVA prevention given his relatively low Kareem Vascor and only one episode of A-fib occurring back in  2018      3. Mixed hyperlipidemia  Assessment & Plan:  Patient with some known vascular disease with target an LDL goal less than 70 recently restarted back on his Crestor with improvement but still mildly above patient was going to try dietary and exercise modification repeat lipids on next visit    Orders:  -     Lipid Panel; Future  -     Hepatic Function Panel; Future    4. Stenosis of carotid artery, unspecified laterality    5. MEJIAS (dyspnea on exertion)  Assessment & Plan:  Patient with a atypical episode occurring in the shower since then with some increased dyspnea on exertion this could be just from deconditioning recommended checking a noninvasive stress test for any ischemic heart issues    Orders:  -     Stress Test With Myocardial Perfusion One Day; Future  -     Lipid Panel; Future  -     Hepatic Function Panel; Future          Follow Up     Return in about 6 months (around 8/15/2023) for Follow with Carmen Crawford.          Patient was given instructions and counseling regarding his condition or for health maintenance advice. Please see specific information pulled into the AVS if appropriate.

## 2023-02-15 NOTE — ASSESSMENT & PLAN NOTE
Patient with a atypical episode occurring in the shower since then with some increased dyspnea on exertion this could be just from deconditioning recommended checking a noninvasive stress test for any ischemic heart issues

## 2023-02-15 NOTE — ASSESSMENT & PLAN NOTE
Patient with some known vascular disease with target an LDL goal less than 70 recently restarted back on his Crestor with improvement but still mildly above patient was going to try dietary and exercise modification repeat lipids on next visit

## 2023-02-15 NOTE — ASSESSMENT & PLAN NOTE
Patient with no symptomatic tachycardic recurrent episodes continue his Toprol 50 mg daily.  In addition patient is on aspirin only 81 daily for CVA prevention given his relatively low Kareem Vascor and only one episode of A-fib occurring back in 2018

## 2023-03-08 RX ORDER — PANTOPRAZOLE SODIUM 40 MG/1
40 TABLET, DELAYED RELEASE ORAL DAILY
Qty: 30 TABLET | Refills: 5 | Status: SHIPPED | OUTPATIENT
Start: 2023-03-08

## 2023-03-08 NOTE — TELEPHONE ENCOUNTER
Caller: Paolo Goldstein    Relationship: Self    Best call back number: [phone]    Requested Prescriptions:   Requested Prescriptions     Pending Prescriptions Disp Refills   • pantoprazole (PROTONIX) 40 MG EC tablet 30 tablet 5     Sig: Take 1 tablet by mouth Daily.        Pharmacy where request should be sent: [id number] - [place], [place] - [address] AT [address] - [phone] [facility] [phone] FX     Does the patient have less than a 3 day supply:  [x] Yes  [] No    Would you like a call back once the refill request has been completed: [] Yes [x] No    If the office needs to give you a call back, can they leave a voicemail: [] Yes [x] No    Faviola Parada Rep   03/08/23 10:59 EST

## 2023-04-07 ENCOUNTER — HOSPITAL ENCOUNTER (OUTPATIENT)
Dept: NUCLEAR MEDICINE | Facility: HOSPITAL | Age: 63
Discharge: HOME | End: 2023-04-07
Payer: COMMERCIAL

## 2023-04-07 DIAGNOSIS — R06.09 DOE (DYSPNEA ON EXERTION): ICD-10-CM

## 2023-04-07 PROCEDURE — 78452 HT MUSCLE IMAGE SPECT MULT: CPT | Performed by: INTERNAL MEDICINE

## 2023-04-07 PROCEDURE — 93018 CV STRESS TEST I&R ONLY: CPT | Performed by: INTERNAL MEDICINE

## 2023-04-07 PROCEDURE — 78452 HT MUSCLE IMAGE SPECT MULT: CPT

## 2023-04-07 PROCEDURE — 0 TECHNETIUM TETROFOSMIN KIT: Performed by: INTERNAL MEDICINE

## 2023-04-07 PROCEDURE — A9502 TC99M TETROFOSMIN: HCPCS | Performed by: INTERNAL MEDICINE

## 2023-04-07 PROCEDURE — 93017 CV STRESS TEST TRACING ONLY: CPT

## 2023-04-07 RX ADMIN — TETROFOSMIN 1 DOSE: 1.38 INJECTION, POWDER, LYOPHILIZED, FOR SOLUTION INTRAVENOUS at 07:19

## 2023-04-07 RX ADMIN — TETROFOSMIN 1 DOSE: 1.38 INJECTION, POWDER, LYOPHILIZED, FOR SOLUTION INTRAVENOUS at 08:09

## 2023-04-09 LAB
BH CV IMMEDIATE POST RECOVERY TECH DATA SYMPTOMS: NORMAL
BH CV IMMEDIATE POST TECH DATA BLOOD PRESSURE: NORMAL MMHG
BH CV IMMEDIATE POST TECH DATA HEART RATE: 135 BPM
BH CV IMMEDIATE POST TECH DATA OXYGEN SATS: 95 %
BH CV REST NUCLEAR ISOTOPE DOSE: 9.8 MCI
BH CV SIX MINUTE RECOVERY TECH DATA BLOOD PRESSURE: NORMAL
BH CV SIX MINUTE RECOVERY TECH DATA HEART RATE: 98 BPM
BH CV SIX MINUTE RECOVERY TECH DATA OXYGEN SATURATION: 96 %
BH CV STRESS BP STAGE 1: NORMAL
BH CV STRESS BP STAGE 2: NORMAL
BH CV STRESS BP STAGE 3: NORMAL
BH CV STRESS DURATION MIN STAGE 1: 3
BH CV STRESS DURATION MIN STAGE 2: 3
BH CV STRESS DURATION MIN STAGE 3: 3
BH CV STRESS DURATION SEC STAGE 1: 0
BH CV STRESS DURATION SEC STAGE 2: 0
BH CV STRESS DURATION SEC STAGE 3: 0
BH CV STRESS GRADE STAGE 1: 10
BH CV STRESS GRADE STAGE 2: 12
BH CV STRESS GRADE STAGE 3: 14
BH CV STRESS HR STAGE 1: 113
BH CV STRESS HR STAGE 2: 140
BH CV STRESS HR STAGE 3: 156
BH CV STRESS METS STAGE 1: 5
BH CV STRESS METS STAGE 2: 7.5
BH CV STRESS METS STAGE 3: 10
BH CV STRESS NUCLEAR ISOTOPE DOSE: 35.9 MCI
BH CV STRESS O2 STAGE 1: 95
BH CV STRESS O2 STAGE 2: 94
BH CV STRESS O2 STAGE 3: 94
BH CV STRESS PROTOCOL 1: NORMAL
BH CV STRESS RECOVERY BP: NORMAL MMHG
BH CV STRESS RECOVERY HR: 98 BPM
BH CV STRESS RECOVERY O2: 96 %
BH CV STRESS SPEED STAGE 1: 1.7
BH CV STRESS SPEED STAGE 2: 2.5
BH CV STRESS SPEED STAGE 3: 3.4
BH CV STRESS STAGE 1: 1
BH CV STRESS STAGE 2: 2
BH CV STRESS STAGE 3: 3
BH CV THREE MINUTE POST TECH DATA BLOOD PRESSURE: NORMAL MMHG
BH CV THREE MINUTE POST TECH DATA HEART RATE: 105 BPM
BH CV THREE MINUTE POST TECH DATA OXYGEN SATURATION: 96 %
LV EF NUC BP: 52 %
MAXIMAL PREDICTED HEART RATE: 158 BPM
PERCENT MAX PREDICTED HR: 98.73 %
STRESS BASELINE BP: NORMAL MMHG
STRESS BASELINE HR: 74 BPM
STRESS O2 SAT REST: 94 %
STRESS PERCENT HR: 116 %
STRESS POST ESTIMATED WORKLOAD: 8.4 METS
STRESS POST EXERCISE DUR MIN: 7 MIN
STRESS POST EXERCISE DUR SEC: 30 SEC
STRESS POST O2 SAT PEAK: 94 %
STRESS POST PEAK BP: NORMAL MMHG
STRESS POST PEAK HR: 156 BPM
STRESS TARGET HR: 134 BPM

## 2023-04-10 ENCOUNTER — TELEPHONE (OUTPATIENT)
Dept: CARDIOLOGY | Facility: CLINIC | Age: 63
End: 2023-04-10
Payer: COMMERCIAL

## 2023-04-10 NOTE — TELEPHONE ENCOUNTER
----- Message from LON Rose sent at 4/10/2023  2:50 AM EDT -----  Notify pt stress test is negative for ischemia, follow up as scheduled

## 2023-06-08 ENCOUNTER — LAB (OUTPATIENT)
Dept: LAB | Facility: HOSPITAL | Age: 63
End: 2023-06-08
Payer: COMMERCIAL

## 2023-06-08 DIAGNOSIS — K59.1 FUNCTIONAL DIARRHEA: ICD-10-CM

## 2023-06-08 DIAGNOSIS — N18.31 STAGE 3A CHRONIC KIDNEY DISEASE: ICD-10-CM

## 2023-06-08 DIAGNOSIS — I65.29 STENOSIS OF CAROTID ARTERY, UNSPECIFIED LATERALITY: ICD-10-CM

## 2023-06-08 DIAGNOSIS — I48.0 PAROXYSMAL ATRIAL FIBRILLATION: ICD-10-CM

## 2023-06-08 DIAGNOSIS — I10 HYPERTENSION, ESSENTIAL: ICD-10-CM

## 2023-06-08 DIAGNOSIS — M86.9 OSTEOMYELITIS OF ANKLE AND FOOT: ICD-10-CM

## 2023-06-08 DIAGNOSIS — D75.1 POLYCYTHEMIA: ICD-10-CM

## 2023-06-08 DIAGNOSIS — I50.9 CONGESTIVE HEART FAILURE, UNSPECIFIED HF CHRONICITY, UNSPECIFIED HEART FAILURE TYPE: ICD-10-CM

## 2023-06-08 DIAGNOSIS — E78.2 MIXED HYPERLIPIDEMIA: ICD-10-CM

## 2023-06-08 DIAGNOSIS — R74.8 ELEVATED LIVER ENZYMES: ICD-10-CM

## 2023-06-08 DIAGNOSIS — D47.2 MONOCLONAL GAMMOPATHY OF UNDETERMINED SIGNIFICANCE: ICD-10-CM

## 2023-06-08 LAB
ALBUMIN SERPL-MCNC: 4.1 G/DL (ref 3.5–5.2)
ALBUMIN/GLOB SERPL: 1.3 G/DL
ALP SERPL-CCNC: 73 U/L (ref 39–117)
ALT SERPL W P-5'-P-CCNC: 24 U/L (ref 1–41)
ANION GAP SERPL CALCULATED.3IONS-SCNC: 10 MMOL/L (ref 5–15)
AST SERPL-CCNC: 20 U/L (ref 1–40)
BASOPHILS # BLD AUTO: 0.05 10*3/MM3 (ref 0–0.2)
BASOPHILS NFR BLD AUTO: 0.7 % (ref 0–1.5)
BILIRUB SERPL-MCNC: 0.7 MG/DL (ref 0–1.2)
BUN SERPL-MCNC: 15 MG/DL (ref 8–23)
BUN/CREAT SERPL: 13.3 (ref 7–25)
CALCIUM SPEC-SCNC: 9.8 MG/DL (ref 8.6–10.5)
CHLORIDE SERPL-SCNC: 106 MMOL/L (ref 98–107)
CHOLEST SERPL-MCNC: 150 MG/DL (ref 0–200)
CO2 SERPL-SCNC: 25 MMOL/L (ref 22–29)
CREAT SERPL-MCNC: 1.13 MG/DL (ref 0.76–1.27)
DEPRECATED RDW RBC AUTO: 42.9 FL (ref 37–54)
EGFRCR SERPLBLD CKD-EPI 2021: 73.5 ML/MIN/1.73
EOSINOPHIL # BLD AUTO: 0.42 10*3/MM3 (ref 0–0.4)
EOSINOPHIL NFR BLD AUTO: 5.7 % (ref 0.3–6.2)
ERYTHROCYTE [DISTWIDTH] IN BLOOD BY AUTOMATED COUNT: 12.3 % (ref 12.3–15.4)
ERYTHROCYTE [SEDIMENTATION RATE] IN BLOOD: 15 MM/HR (ref 0–20)
GLOBULIN UR ELPH-MCNC: 3.2 GM/DL
GLUCOSE SERPL-MCNC: 101 MG/DL (ref 65–99)
HBA1C MFR BLD: 5.9 % (ref 4.8–5.6)
HCT VFR BLD AUTO: 52 % (ref 37.5–51)
HDLC SERPL-MCNC: 32 MG/DL (ref 40–60)
HGB BLD-MCNC: 17.7 G/DL (ref 13–17.7)
IMM GRANULOCYTES # BLD AUTO: 0.03 10*3/MM3 (ref 0–0.05)
IMM GRANULOCYTES NFR BLD AUTO: 0.4 % (ref 0–0.5)
LDLC SERPL CALC-MCNC: 98 MG/DL (ref 0–100)
LDLC/HDLC SERPL: 3.02 {RATIO}
LYMPHOCYTES # BLD AUTO: 1.91 10*3/MM3 (ref 0.7–3.1)
LYMPHOCYTES NFR BLD AUTO: 25.7 % (ref 19.6–45.3)
MCH RBC QN AUTO: 32.3 PG (ref 26.6–33)
MCHC RBC AUTO-ENTMCNC: 34 G/DL (ref 31.5–35.7)
MCV RBC AUTO: 94.9 FL (ref 79–97)
MONOCYTES # BLD AUTO: 0.54 10*3/MM3 (ref 0.1–0.9)
MONOCYTES NFR BLD AUTO: 7.3 % (ref 5–12)
NEUTROPHILS NFR BLD AUTO: 4.48 10*3/MM3 (ref 1.7–7)
NEUTROPHILS NFR BLD AUTO: 60.2 % (ref 42.7–76)
NRBC BLD AUTO-RTO: 0 /100 WBC (ref 0–0.2)
PLATELET # BLD AUTO: 190 10*3/MM3 (ref 140–450)
PMV BLD AUTO: 11.6 FL (ref 6–12)
POTASSIUM SERPL-SCNC: 4.8 MMOL/L (ref 3.5–5.2)
PROT SERPL-MCNC: 7.3 G/DL (ref 6–8.5)
RBC # BLD AUTO: 5.48 10*6/MM3 (ref 4.14–5.8)
SODIUM SERPL-SCNC: 141 MMOL/L (ref 136–145)
TRIGL SERPL-MCNC: 107 MG/DL (ref 0–150)
URATE SERPL-MCNC: 4.4 MG/DL (ref 3.4–7)
VLDLC SERPL-MCNC: 20 MG/DL (ref 5–40)
WBC NRBC COR # BLD: 7.43 10*3/MM3 (ref 3.4–10.8)

## 2023-06-08 PROCEDURE — 85652 RBC SED RATE AUTOMATED: CPT

## 2023-06-08 PROCEDURE — 83036 HEMOGLOBIN GLYCOSYLATED A1C: CPT

## 2023-06-08 PROCEDURE — 80061 LIPID PANEL: CPT

## 2023-06-08 PROCEDURE — 85025 COMPLETE CBC W/AUTO DIFF WBC: CPT

## 2023-06-08 PROCEDURE — 80053 COMPREHEN METABOLIC PANEL: CPT

## 2023-06-08 PROCEDURE — 36415 COLL VENOUS BLD VENIPUNCTURE: CPT

## 2023-06-08 PROCEDURE — 84550 ASSAY OF BLOOD/URIC ACID: CPT

## 2023-06-09 ENCOUNTER — TELEPHONE (OUTPATIENT)
Dept: CARDIOLOGY | Facility: CLINIC | Age: 63
End: 2023-06-09
Payer: COMMERCIAL

## 2023-06-09 ENCOUNTER — OFFICE VISIT (OUTPATIENT)
Dept: ORTHOPEDIC SURGERY | Facility: CLINIC | Age: 63
End: 2023-06-09
Payer: COMMERCIAL

## 2023-06-09 VITALS
BODY MASS INDEX: 37.67 KG/M2 | HEIGHT: 67 IN | HEART RATE: 56 BPM | DIASTOLIC BLOOD PRESSURE: 85 MMHG | SYSTOLIC BLOOD PRESSURE: 131 MMHG | WEIGHT: 240 LBS | OXYGEN SATURATION: 97 %

## 2023-06-09 DIAGNOSIS — E78.2 MIXED HYPERLIPIDEMIA: Primary | ICD-10-CM

## 2023-06-09 DIAGNOSIS — M16.11 OSTEOARTHRITIS OF RIGHT HIP, UNSPECIFIED OSTEOARTHRITIS TYPE: ICD-10-CM

## 2023-06-09 DIAGNOSIS — M25.551 RIGHT HIP PAIN: Primary | ICD-10-CM

## 2023-06-09 DIAGNOSIS — M17.11 OSTEOARTHRITIS OF RIGHT KNEE, UNSPECIFIED OSTEOARTHRITIS TYPE: ICD-10-CM

## 2023-06-09 DIAGNOSIS — M25.561 RIGHT KNEE PAIN, UNSPECIFIED CHRONICITY: ICD-10-CM

## 2023-06-09 RX ORDER — ROSUVASTATIN CALCIUM 20 MG/1
20 TABLET, COATED ORAL DAILY
Qty: 90 TABLET | Refills: 3 | Status: SHIPPED | OUTPATIENT
Start: 2023-06-09

## 2023-06-09 RX ORDER — MELOXICAM 15 MG/1
15 TABLET ORAL DAILY
Qty: 30 TABLET | Refills: 2 | Status: SHIPPED | OUTPATIENT
Start: 2023-06-09

## 2023-06-09 RX ORDER — SODIUM PHOSPHATE,MONO-DIBASIC 19G-7G/118
ENEMA (ML) RECTAL
COMMUNITY

## 2023-06-09 NOTE — TELEPHONE ENCOUNTER
----- Message from LON Rose sent at 6/9/2023  9:25 AM EDT -----  Notify renal function, electrolytes, liver enzymes  Lipid panel shows LDL is still not to goal range of less than 70, would recommend increasing crestor dose to 20 mg daily and repeat fasting lipid/hepatic function panel in 3 months

## 2023-06-09 NOTE — PROGRESS NOTES
"Chief Complaint  Initial Evaluation and Pain of the Right Hip and Initial Evaluation and Pain of the Right Knee     Subjective      Paolo Goldstein presents to [facility] for initial evaluation of the right hip and the right knee.  He is been having pain in the right hip and knee a few months ago.  He went to physical therapy and now he can sleep.  He has some groin pain and now the lateral aspect of the hip.  He can do squats and anything like that but difficulty with prolonged standing and ambulation.  He has had no recent injury.      No Known Allergies     Social History     Socioeconomic History    Marital status:    Tobacco Use    Smoking status: Never    Smokeless tobacco: Never   Vaping Use    Vaping Use: Never used   Substance and Sexual Activity    Alcohol use: Yes     Comment: occasionally    Drug use: Never    Sexual activity: Yes     Partners: Female     Birth control/protection: None        Review of Systems     Objective   Vital Signs:   /85   Pulse 56   Ht 170.2 cm (67\")   Wt 109 kg (240 lb)   SpO2 97%   BMI 37.59 kg/m²       Physical Exam  Constitutional:       Appearance: Normal appearance. Patient is well-developed and normal weight.   HENT:      Head: Normocephalic.      Right Ear: Hearing and external ear normal.      Left Ear: Hearing and external ear normal.      Nose: Nose normal.   Eyes:      Conjunctiva/sclera: Conjunctivae normal.   Cardiovascular:      Rate and Rhythm: Normal rate.   Pulmonary:      Effort: Pulmonary effort is normal.      Breath sounds: No wheezing or rales.   Abdominal:      Palpations: Abdomen is soft.      Tenderness: There is no abdominal tenderness.   Musculoskeletal:      Cervical back: Normal range of motion.   Skin:     Findings: No rash.   Neurological:      Mental Status: Patient is alert and oriented to person, place, and time.   Psychiatric:         Mood and Affect: Mood and affect normal.         Judgment: " Judgment normal.       Ortho Exam      RIGHT HIP  No skin discoloration, atrophy or swelling. Positive EHL, FHL, GS, and TA. Sensation intact to all 5 nerves of the foot. Positive straight leg raise. Leg lengths appear equal. Ambulates with Antalgic gait Negative Yeni. Negative Dalia. Good strength to hamstrings, quadriceps, dorsiflexors, and plantar flexors. Knee Extensor Mechanism  intact     RIGHT KNEE Flexion 120. Extension 0. Stable to varus/valgus stress. Stable to anterior/posterior drawer. Neurovascularly intact. Negative Nash. Negative Lachman. Positive EHL, FHL, HS and TA. Sensation intact to light touch all 5 nerves of the foot. Ambulates with Antalgic gait. Patella is well tracking. Calf supple, non-tender. Positive tenderness to the medial joint line. Positive tenderness to the lateral joint line. Positive Crepitus. Good strength to hamstrings, quadriceps, dorsiflexors, and plantar flexors.  Knee Extensor Mechanism intact      Procedures      Imaging Results (Most Recent)       Procedure Component Value Units Date/Time    XR Knee 3 View Right [[id number]] Resulted: 06/09/23 0808     Updated: 06/09/23 0808    XR Hip With or Without Pelvis 2 - 3 View Right [[id number]] Resulted: 06/09/23 0807     Updated: 06/09/23 0808             Result Review :     X-Ray Report:  Right hip X-Ray  Indication: Evaluation of the right hip  AP/Lateral view(s)  Findings: Moderately advanced arthritis.    Prior studies available for comparison: yes     X-Ray Report:  Right knee X-Ray  Indication: Evaluation of the right knee  AP/Lateral and Royer view(s)  Findings: Moderate arthritis.    Prior studies available for comparison: yes           Assessment and Plan     Diagnoses and all orders for this visit:    1. Right hip pain (Primary)  -     XR Hip With or Without Pelvis 2 - 3 View Right    2. Right knee pain, unspecified chronicity  -     XR Knee 3 View Right    3. Osteoarthritis of right hip, unspecified osteoarthritis  type    4. Osteoarthritis of right knee, unspecified osteoarthritis type        Discussed the treatment plan with the patient. I reviewed the X-rays that were obtained today with the patient.     Continue physical therapy.     Prescribed anti inflammatory.         Call or return if worsening symptoms.    Follow Up     PRN      Patient was given instructions and counseling regarding his condition or for health maintenance advice. Please see specific information pulled into the AVS if appropriate.     Scribed for Cassius Reilly MD by Carmel Pate MA.  06/09/23   07:57 EDT      I have personally performed the services described in this document as scribed by the above individual and it is both accurate and complete. Cassius Reilly MD 06/09/23

## 2023-06-14 ENCOUNTER — OFFICE VISIT (OUTPATIENT)
Dept: INTERNAL MEDICINE | Facility: CLINIC | Age: 63
End: 2023-06-14
Payer: COMMERCIAL

## 2023-06-14 VITALS
WEIGHT: 234 LBS | BODY MASS INDEX: 36.73 KG/M2 | DIASTOLIC BLOOD PRESSURE: 83 MMHG | HEART RATE: 66 BPM | OXYGEN SATURATION: 94 % | HEIGHT: 67 IN | SYSTOLIC BLOOD PRESSURE: 126 MMHG | TEMPERATURE: 98 F

## 2023-06-14 DIAGNOSIS — D47.2 MONOCLONAL GAMMOPATHY OF UNDETERMINED SIGNIFICANCE: ICD-10-CM

## 2023-06-14 DIAGNOSIS — E78.2 MIXED HYPERLIPIDEMIA: ICD-10-CM

## 2023-06-14 DIAGNOSIS — D75.1 POLYCYTHEMIA: ICD-10-CM

## 2023-06-14 DIAGNOSIS — I10 HYPERTENSION, ESSENTIAL: Primary | ICD-10-CM

## 2023-06-14 DIAGNOSIS — I50.9 CONGESTIVE HEART FAILURE, UNSPECIFIED HF CHRONICITY, UNSPECIFIED HEART FAILURE TYPE: ICD-10-CM

## 2023-06-14 DIAGNOSIS — K59.1 FUNCTIONAL DIARRHEA: ICD-10-CM

## 2023-06-14 DIAGNOSIS — I48.0 PAROXYSMAL ATRIAL FIBRILLATION: ICD-10-CM

## 2023-06-14 DIAGNOSIS — R73.01 IFG (IMPAIRED FASTING GLUCOSE): ICD-10-CM

## 2023-06-14 DIAGNOSIS — M25.571 ACUTE RIGHT ANKLE PAIN: ICD-10-CM

## 2023-06-14 DIAGNOSIS — G47.33 OSA (OBSTRUCTIVE SLEEP APNEA): ICD-10-CM

## 2023-06-14 DIAGNOSIS — N18.31 STAGE 3A CHRONIC KIDNEY DISEASE: ICD-10-CM

## 2023-06-14 DIAGNOSIS — M86.9 OSTEOMYELITIS OF ANKLE AND FOOT: ICD-10-CM

## 2023-06-14 DIAGNOSIS — I65.29 STENOSIS OF CAROTID ARTERY, UNSPECIFIED LATERALITY: ICD-10-CM

## 2023-06-14 DIAGNOSIS — R74.8 ELEVATED LIVER ENZYMES: ICD-10-CM

## 2023-06-14 DIAGNOSIS — M86.071 ACUTE HEMATOGENOUS OSTEOMYELITIS OF RIGHT FOOT: ICD-10-CM

## 2023-06-14 DIAGNOSIS — Z12.5 SCREENING PSA (PROSTATE SPECIFIC ANTIGEN): ICD-10-CM

## 2023-06-14 NOTE — PROGRESS NOTES
"CHIEF COMPLAINT/ HPI: Follow-up with his routine 6-month checkup, history of osteomyelitis, CKD, hypertension, impaired fasting glucose, history of elevated liver enzymes etc., patient says he is doing well he is playing golf a lot,  Hyperlipidemia and Follow-up (Patient is here for a routine follow up )      follow-up with history of CKD, lab work recently, elevated liver enzymes in the past history of osteomyelitis ankle injury which is improved,    --- Patient is wanting to go ahead and set up another phlebotomy, he is not feeling as well as head feels full some pressure,    Objective   Vital Signs  Vitals:    06/14/23 0952   BP: 126/83   Pulse: 66   Temp: 98 °F (36.7 °C)   SpO2: 94%   Weight: 106 kg (234 lb)   Height: 170.2 cm (67.01\")      Body mass index is 36.64 kg/m².  Review of Systems   Constitutional: Negative.    HENT: Negative.     Eyes: Negative.    Respiratory: Negative.     Cardiovascular: Negative.    Gastrointestinal: Negative.    Endocrine: Negative.    Genitourinary: Negative.    Musculoskeletal: Negative.    Allergic/Immunologic: Negative.    Neurological: Negative.    Hematological: Negative.    Psychiatric/Behavioral: Negative.      Physical Exam  Constitutional:       General: He is not in acute distress.     Appearance: Normal appearance. He is obese.   HENT:      Head: Normocephalic.      Mouth/Throat:      Mouth: Mucous membranes are moist.   Eyes:      Conjunctiva/sclera: Conjunctivae normal.      Pupils: Pupils are equal, round, and reactive to light.   Cardiovascular:      Rate and Rhythm: Normal rate and regular rhythm.      Pulses: Normal pulses.      Heart sounds: Normal heart sounds.   Pulmonary:      Effort: Pulmonary effort is normal.      Breath sounds: Normal breath sounds.   Abdominal:      General: Bowel sounds are normal.      Palpations: Abdomen is soft.   Musculoskeletal:         General: No swelling. Normal range of motion.      Cervical back: Neck supple.   Skin:     " General: Skin is warm and dry.      Coloration: Skin is not jaundiced.   Neurological:      General: No focal deficit present.      Mental Status: He is alert and oriented to person, place, and time. Mental status is at baseline.   Psychiatric:         Mood and Affect: Mood normal.         Behavior: Behavior normal.         Thought Content: Thought content normal.         Judgment: Judgment normal.      Result Review :   No results found for: PROBNP, BNP  CMP          12/8/2022    11:21 3/23/2023    13:04 6/8/2023    09:34   CMP   Glucose 95   101    BUN 16   15    Creatinine 1.22   1.13    EGFR 67.0   73.5    Sodium 135   141    Potassium 4.6   4.8    Chloride 97   106    Calcium 9.8   9.8    Total Protein 7.4  7.4     7.3    Albumin 4.30   4.1    Globulin 3.1   3.2    Total Bilirubin 0.6   0.7    Alkaline Phosphatase 83   73    AST (SGOT) 22   20    ALT (SGPT) 21   24    Albumin/Globulin Ratio 1.4   1.3    BUN/Creatinine Ratio 13.1   13.3    Anion Gap 8.0   10.0       Details          This result is from an external source.             CBC w/diff          8/29/2022    07:27 12/8/2022    11:21 6/8/2023    09:34   CBC w/Diff   WBC 7.78  8.72  7.43    RBC 5.19  5.40  5.48    Hemoglobin 16.8  17.5  17.7    Hematocrit 49.3  50.9  52.0    MCV 95.0  94.3  94.9    MCH 32.4  32.4  32.3    MCHC 34.1  34.4  34.0    RDW 12.7  12.1  12.3    Platelets 205  197  190    Neutrophil Rel % 57.3  61.7  60.2    Immature Granulocyte Rel % 0.5  0.5  0.4    Lymphocyte Rel % 28.0  23.4  25.7    Monocyte Rel % 9.0  9.1  7.3    Eosinophil Rel % 4.6  4.7  5.7    Basophil Rel % 0.6  0.6  0.7       Lipid Panel          8/29/2022    07:27 12/8/2022    11:21 6/8/2023    09:34   Lipid Panel   Total Cholesterol 211  142  150    Triglycerides 161  88  107    HDL Cholesterol 32  34  32    VLDL Cholesterol 29  17  20    LDL Cholesterol  150  91  98    LDL/HDL Ratio 4.59  2.66  3.02       Lab Results   Component Value Date    TSH 1.750 12/07/2021     TSH 2.780 09/13/2021    TSH 2.070 08/02/2019      Lab Results   Component Value Date    FREET4 1.09 12/07/2021    FREET4 0.9 05/04/2019      A1C Last 3 Results          8/29/2022    07:27 6/8/2023    09:34   HGBA1C Last 3 Results   Hemoglobin A1C 5.60  5.90       PSA          12/8/2022    11:21   PSA   PSA 1.570                     Visit Diagnoses:    ICD-10-CM ICD-9-CM   1. Hypertension, essential  I10 401.9   2. Screening PSA (prostate specific antigen)  Z12.5 V76.44   3. Monoclonal gammopathy of undetermined significance  D47.2 273.1   4. Mixed hyperlipidemia  E78.2 272.2   5. Polycythemia  D75.1 238.4   6. Osteomyelitis of ankle and foot  M86.9 730.27   7. Elevated liver enzymes  R74.8 790.5   8. Stage 3a chronic kidney disease  N18.31 585.3   9. Paroxysmal atrial fibrillation  I48.0 427.31   10. IFG (impaired fasting glucose)  R73.01 790.21   11. Functional diarrhea  K59.1 564.5   12. Stenosis of carotid artery, unspecified laterality  I65.29 433.10   13. Acute right ankle pain  M25.571 719.47     338.19   14. Acute hematogenous osteomyelitis of right foot  M86.071 730.07   15. Congestive heart failure, unspecified HF chronicity, unspecified heart failure type  I50.9 428.0   16. JAMEE (obstructive sleep apnea)  G47.33 327.23       Assessment and Plan   Diagnoses and all orders for this visit:    1. Hypertension, essential (Primary)  -     Lipid Panel; Future  -     Hemoglobin A1c; Future  -     Comprehensive Metabolic Panel; Future  -     CBC & Differential; Future  -     PSA Screen; Future  -     Vitamin B12 anemia; Future  -     Folate anemia; Future  -     Vitamin D,25-Hydroxy; Future  -     Sedimentation Rate; Future  -     C-reactive Protein; Future    2. Screening PSA (prostate specific antigen)  -     Lipid Panel; Future  -     Hemoglobin A1c; Future  -     Comprehensive Metabolic Panel; Future  -     CBC & Differential; Future  -     PSA Screen; Future  -     Vitamin B12 anemia; Future  -     Folate  anemia; Future  -     Vitamin D,25-Hydroxy; Future  -     Sedimentation Rate; Future  -     C-reactive Protein; Future    3. Monoclonal gammopathy of undetermined significance  -     Lipid Panel; Future  -     Hemoglobin A1c; Future  -     Comprehensive Metabolic Panel; Future  -     CBC & Differential; Future  -     PSA Screen; Future  -     Vitamin B12 anemia; Future  -     Folate anemia; Future  -     Vitamin D,25-Hydroxy; Future  -     Sedimentation Rate; Future  -     C-reactive Protein; Future    4. Mixed hyperlipidemia  -     Lipid Panel; Future  -     Hemoglobin A1c; Future  -     Comprehensive Metabolic Panel; Future  -     CBC & Differential; Future  -     PSA Screen; Future  -     Vitamin B12 anemia; Future  -     Folate anemia; Future  -     Vitamin D,25-Hydroxy; Future  -     Sedimentation Rate; Future  -     C-reactive Protein; Future    5. Polycythemia  -     Lipid Panel; Future  -     Hemoglobin A1c; Future  -     Comprehensive Metabolic Panel; Future  -     CBC & Differential; Future  -     PSA Screen; Future  -     Vitamin B12 anemia; Future  -     Folate anemia; Future  -     Vitamin D,25-Hydroxy; Future  -     Sedimentation Rate; Future  -     C-reactive Protein; Future    6. Osteomyelitis of ankle and foot  -     Lipid Panel; Future  -     Hemoglobin A1c; Future  -     Comprehensive Metabolic Panel; Future  -     CBC & Differential; Future  -     PSA Screen; Future  -     Vitamin B12 anemia; Future  -     Folate anemia; Future  -     Vitamin D,25-Hydroxy; Future  -     Sedimentation Rate; Future  -     C-reactive Protein; Future    7. Elevated liver enzymes  -     Lipid Panel; Future  -     Hemoglobin A1c; Future  -     Comprehensive Metabolic Panel; Future  -     CBC & Differential; Future  -     PSA Screen; Future  -     Vitamin B12 anemia; Future  -     Folate anemia; Future  -     Vitamin D,25-Hydroxy; Future  -     Sedimentation Rate; Future  -     C-reactive Protein; Future    8. Stage 3a  chronic kidney disease  -     Lipid Panel; Future  -     Hemoglobin A1c; Future  -     Comprehensive Metabolic Panel; Future  -     CBC & Differential; Future  -     PSA Screen; Future  -     Vitamin B12 anemia; Future  -     Folate anemia; Future  -     Vitamin D,25-Hydroxy; Future  -     Sedimentation Rate; Future  -     C-reactive Protein; Future    9. Paroxysmal atrial fibrillation  -     Lipid Panel; Future  -     Hemoglobin A1c; Future  -     Comprehensive Metabolic Panel; Future  -     CBC & Differential; Future  -     PSA Screen; Future  -     Vitamin B12 anemia; Future  -     Folate anemia; Future  -     Vitamin D,25-Hydroxy; Future  -     Sedimentation Rate; Future  -     C-reactive Protein; Future    10. IFG (impaired fasting glucose)  -     Lipid Panel; Future  -     Hemoglobin A1c; Future  -     Comprehensive Metabolic Panel; Future  -     CBC & Differential; Future  -     PSA Screen; Future  -     Vitamin B12 anemia; Future  -     Folate anemia; Future  -     Vitamin D,25-Hydroxy; Future  -     Sedimentation Rate; Future  -     C-reactive Protein; Future    11. Functional diarrhea  -     Sedimentation Rate; Future  -     C-reactive Protein; Future    12. Stenosis of carotid artery, unspecified laterality  -     Sedimentation Rate; Future  -     C-reactive Protein; Future    13. Acute right ankle pain  -     Sedimentation Rate; Future  -     C-reactive Protein; Future    14. Acute hematogenous osteomyelitis of right foot  -     Sedimentation Rate; Future  -     C-reactive Protein; Future    15. Congestive heart failure, unspecified HF chronicity, unspecified heart failure type  -     Sedimentation Rate; Future  -     C-reactive Protein; Future    16. JAMEE (obstructive sleep apnea)  -     Ambulatory Referral to Sleep Lab  -     Sedimentation Rate; Future  -     C-reactive Protein; Future             Stress test April 7, 2023 shows normal exercise capacity low risk study no evidence of ischemia,   Be    Moderately advanced arthritis of the hips especially the right, and moderate arthritis in the right knee,, x-rays, June 2023 following up with Ortho----started meloxicam, June 2023     CKD stage IIIa,  down to 1.2 December 8, 2022,    Osteomyelitis, right ankle, has finished up course of antibiotics treatment options including continue doxycycline for 6 months versus following sed rates retreating if sed rates increase, discussed with patient December 9, 2021 --------------------patient had surgery for I&D and bone biopsy sept 1, 2021--- showing MRSA and gout into the right ankle and foot on September 1, 2021 by Dr. Turner at [facility] --- through [facility], had a PICC line placed, was put on IV antibiotics by infectious disease doctor  on vancomycin every 12 hours at home, for total of 6 weeks----released from orthopedics care as of January 2022,, sed rate CRPs both levels are undetectable or essentially 0 as of March 2022,     Impaired fasting glucose, hemoglobin A1c 5.9 June 8, 2023    LAP CHOLEY, AND POST OP BILE LEAK/ CBD OBSTRUCTION, READMITTED,AND HAD ERCP WITH DR PICKARD--- DEVELOPED AFIB WITH RVR, SEPTIC , ELEVATED WBC, APRIL 2019, CARDIOVERTED, WAS RX WITH ABX, HAD MARIAN DRAIN PLACED BY RADIOLOGY-- AND THEN ANOTHER MARIAN DRAIN PLACED, --THORACENTISIS ALSO DONE TIMES 2, --HAD STENT REMOVED JUNE 2019, HAD STRESS TEST WITH [facility],, May 2019, no evidence of ischemia and normal myocardial SPECT perfusion study,-------CARDIAC EVENT MONITOR 8/2019, --NO AFIB - Stopped eliquis October 2019    Gout, cont allopurinol 300 mg qd, uric acid level 4.9 December 2022,    VASC CALCIFICATIONS SEEN ON CAROTIDS ---ON PLAIN XRAY PER [facility] FOR BONE SURVERY ----- CAROTID U/S, MARCH 2021,, no stenosis present carotid bifurcations, mild to moderate mixed density nonstenotic plaque bilateral, vertebral flow antegrade,, continue statin therapy if possible,    POLYCYTHEMIA, -hemoglobin 17.7 hematocrit 52, June 8, 2023,,  consider phlebotomy, discussed with patient June 14, 2023 --- previously had issue with being presyncope vasovagal when he was going to get it phlebotomized, will set up a therapy plan, for phlebotomy, June 13, 2023    Because of polycythemia, organ to do a sleep study at the request of the [place] physician, June 2023,    PSA 1.5 December 8, 2022    HTN--cont ATACAND 24 MG QD, METOPROLOL 50 QOD AND 75 QOD     ELEVATED CHOL.--continues Qunol, Crestor, 20 mg daily    ELEVATED LFTS, KEENAN--going back to 2016--currently normal June 8, 2023,------- ultrasounds liver reviewed, October 2017 and March 2017, shows improvement in liver size,---Has had ultrasounds and fibrosis scan October 2019, slightly elevated score of 0.39 stage F1 to F2, moderate steatosis, has been followed by GI service, -- ultrasound March 5, 2021 shows normal echogenicity of the liver texture, no liver lesions, no biliary ductal dilation, otherwise unremarkable--ultrasound of the liver September 1, 2022 shows liver size at 16.1 cm otherwise unremarkable scan,    MONOCLONAL PARAPROTEINURIA, DX IN 2015-16, NOW GOING TO [place] FOR F/U Total proteins have been stable over the last several years through [place] labs, no treatment at this time    OA, OF L HIP AND NICOLAS 12/13, KAREN    OVERWGT-continue weight loss efforts, start walking more, exercise if he can,, discussed June 2023    LVH ON EKG AND ECHO 2016    CSCOPE 9/13 AND EGD , AND AUG 2017, NEERAJ colonoscopy August 19, 2022 tubular adenoma,           Follow Up   No follow-ups on file.  Patient was given instructions and counseling regarding his condition or for health maintenance advice. Please see specific information pulled into the AVS if appropriate.

## 2023-07-17 ENCOUNTER — TELEPHONE (OUTPATIENT)
Dept: INTERNAL MEDICINE | Facility: CLINIC | Age: 63
End: 2023-07-17

## 2023-07-17 NOTE — TELEPHONE ENCOUNTER
Caller: Paolo Goldstein    Relationship: Self    Best call back number: [phone]     What orders are you requesting (i.e. lab or imaging): THERAPEUTIC PHLEBOTOMY    In what timeframe would the patient need to come in: ASAP    Where will you receive your lab/imaging services: HOSPITAL    Additional notes: PLEASE CALL AND ADVISE WHEN ORDERS HAVE BEEN PLACED

## 2023-07-28 ENCOUNTER — HOSPITAL ENCOUNTER (OUTPATIENT)
Dept: INFUSION THERAPY | Facility: HOSPITAL | Age: 63
Discharge: HOME | End: 2023-07-28
Payer: COMMERCIAL

## 2023-07-28 VITALS
RESPIRATION RATE: 20 BRPM | SYSTOLIC BLOOD PRESSURE: 130 MMHG | BODY MASS INDEX: 37.3 KG/M2 | HEART RATE: 64 BPM | TEMPERATURE: 97.8 F | WEIGHT: 237.66 LBS | DIASTOLIC BLOOD PRESSURE: 70 MMHG | HEIGHT: 67 IN | OXYGEN SATURATION: 97 %

## 2023-07-28 DIAGNOSIS — D75.1 POLYCYTHEMIA: Primary | ICD-10-CM

## 2023-07-28 LAB
HCT VFR BLD AUTO: 47.8 % (ref 37.5–51)
HGB BLD-MCNC: 16.3 G/DL (ref 13–17.7)

## 2023-07-28 PROCEDURE — 36415 COLL VENOUS BLD VENIPUNCTURE: CPT

## 2023-07-28 PROCEDURE — 85014 HEMATOCRIT: CPT | Performed by: INTERNAL MEDICINE

## 2023-07-28 PROCEDURE — 85018 HEMOGLOBIN: CPT | Performed by: INTERNAL MEDICINE

## 2023-07-28 PROCEDURE — 99195 PHLEBOTOMY: CPT

## 2023-07-28 RX ORDER — SODIUM CHLORIDE 9 MG/ML
250 INJECTION, SOLUTION INTRAVENOUS ONCE
OUTPATIENT
Start: 2023-07-30

## 2023-07-28 RX ORDER — SODIUM CHLORIDE 9 MG/ML
250 INJECTION, SOLUTION INTRAVENOUS ONCE
Status: DISCONTINUED | OUTPATIENT
Start: 2023-07-28 | End: 2023-07-30 | Stop reason: HOSPADM

## 2023-08-09 ENCOUNTER — LAB (OUTPATIENT)
Dept: LAB | Facility: HOSPITAL | Age: 63
End: 2023-08-09
Payer: COMMERCIAL

## 2023-08-09 DIAGNOSIS — D75.1 POLYCYTHEMIA: ICD-10-CM

## 2023-08-09 DIAGNOSIS — R06.09 DOE (DYSPNEA ON EXERTION): ICD-10-CM

## 2023-08-09 DIAGNOSIS — E78.2 MIXED HYPERLIPIDEMIA: ICD-10-CM

## 2023-08-09 LAB
ALBUMIN SERPL-MCNC: 3.6 G/DL (ref 3.5–5.2)
ALP SERPL-CCNC: 65 U/L (ref 39–117)
ALT SERPL W P-5'-P-CCNC: 39 U/L (ref 1–41)
AST SERPL-CCNC: 21 U/L (ref 1–40)
BILIRUB CONJ SERPL-MCNC: <0.2 MG/DL (ref 0–0.3)
BILIRUB INDIRECT SERPL-MCNC: NORMAL MG/DL
BILIRUB SERPL-MCNC: 0.4 MG/DL (ref 0–1.2)
CHOLEST SERPL-MCNC: 113 MG/DL (ref 0–200)
HCT VFR BLD AUTO: 45.2 % (ref 37.5–51)
HDLC SERPL-MCNC: 27 MG/DL (ref 40–60)
HGB BLD-MCNC: 15.4 G/DL (ref 13–17.7)
LDLC SERPL CALC-MCNC: 65 MG/DL (ref 0–100)
LDLC/HDLC SERPL: 2.33 {RATIO}
PROT SERPL-MCNC: 6.5 G/DL (ref 6–8.5)
TRIGL SERPL-MCNC: 116 MG/DL (ref 0–150)
VLDLC SERPL-MCNC: 21 MG/DL (ref 5–40)

## 2023-08-09 PROCEDURE — 80076 HEPATIC FUNCTION PANEL: CPT

## 2023-08-09 PROCEDURE — 36415 COLL VENOUS BLD VENIPUNCTURE: CPT

## 2023-08-09 PROCEDURE — 80061 LIPID PANEL: CPT

## 2023-08-09 PROCEDURE — 85018 HEMOGLOBIN: CPT

## 2023-08-09 PROCEDURE — 85014 HEMATOCRIT: CPT

## 2023-08-10 ENCOUNTER — TELEPHONE (OUTPATIENT)
Dept: CARDIOLOGY | Facility: CLINIC | Age: 63
End: 2023-08-10
Payer: COMMERCIAL

## 2023-08-10 NOTE — TELEPHONE ENCOUNTER
----- Message from LON Rose sent at 8/10/2023  9:46 AM EDT -----  Labs look good, continue current meds

## 2023-08-16 ENCOUNTER — OFFICE VISIT (OUTPATIENT)
Dept: CARDIOLOGY | Facility: CLINIC | Age: 63
End: 2023-08-16
Payer: COMMERCIAL

## 2023-08-16 VITALS
SYSTOLIC BLOOD PRESSURE: 121 MMHG | BODY MASS INDEX: 37.67 KG/M2 | HEIGHT: 67 IN | WEIGHT: 240 LBS | HEART RATE: 66 BPM | DIASTOLIC BLOOD PRESSURE: 70 MMHG

## 2023-08-16 DIAGNOSIS — I48.0 PAROXYSMAL ATRIAL FIBRILLATION: Primary | ICD-10-CM

## 2023-08-16 DIAGNOSIS — I65.23 CAROTID ATHEROSCLEROSIS, BILATERAL: ICD-10-CM

## 2023-08-16 DIAGNOSIS — I10 HYPERTENSION, ESSENTIAL: ICD-10-CM

## 2023-08-16 DIAGNOSIS — E78.2 MIXED HYPERLIPIDEMIA: ICD-10-CM

## 2023-08-16 PROBLEM — K63.5 COLON POLYPS: Status: ACTIVE | Noted: 2023-08-16

## 2023-08-16 PROBLEM — M86.671: Status: ACTIVE | Noted: 2021-09-01

## 2023-08-16 PROBLEM — R06.09 DOE (DYSPNEA ON EXERTION): Status: RESOLVED | Noted: 2023-02-15 | Resolved: 2023-08-16

## 2023-08-16 PROBLEM — M86.071 ACUTE HEMATOGENOUS OSTEOMYELITIS OF RIGHT FOOT: Status: RESOLVED | Noted: 2021-09-23 | Resolved: 2023-08-16

## 2023-08-16 PROBLEM — M19.072 OSTEOARTHRITIS OF LEFT FOOT: Status: ACTIVE | Noted: 2019-01-12

## 2023-08-16 PROBLEM — K21.9 GERD (GASTROESOPHAGEAL REFLUX DISEASE): Status: ACTIVE | Noted: 2023-08-16

## 2023-08-16 PROBLEM — M25.571 ACUTE RIGHT ANKLE PAIN: Status: RESOLVED | Noted: 2021-12-09 | Resolved: 2023-08-16

## 2023-08-16 PROBLEM — K76.0 STEATOSIS OF LIVER: Status: ACTIVE | Noted: 2023-08-16

## 2023-08-16 PROBLEM — I50.9 CONGESTIVE HEART FAILURE: Status: RESOLVED | Noted: 2021-08-14 | Resolved: 2023-08-16

## 2023-08-16 NOTE — PROGRESS NOTES
Chief Complaint  Follow-up, Hypertension, and Hyperlipidemia    Subjective            History of Present Illness  Paolo Goldstein is a 63-year-old white/ male patient who presents to the office today for follow-up.  He has paroxysmal atrial fibrillation, hypertension, hyperlipidemia, and bilateral carotid atherosclerosis.  He is compliant with medications.  He denies any chest pain, shortness of breath, lightheadedness/dizziness, palpitations, or edema.    PMH  Past Medical History:   Diagnosis Date    A-fib     Acid reflux     Arthritis     Carotid atherosclerosis, bilateral 12/09/2021    Colon polyps     Essential hypertension     Family history of colon cancer     Fatty liver     GERD (gastroesophageal reflux disease)     Hip pain     LEFT     MGUS (monoclonal gammopathy of unknown significance)     Pes cavus of left foot 01/12/2019    Primary osteoarthritis of left foot 01/12/2019    Sciatic nerve pain          ALLERGY  No Known Allergies       SURGICALHX  Past Surgical History:   Procedure Laterality Date    ANKLE SURGERY Right 1977    RIGHT ANKLE CLOSED REDUCTION     CARDIOVERSION      CHOLECYSTECTOMY      LAPAROSCOPIC    COLONOSCOPY  04/21/2017    DR. PICKARD    COLONOSCOPY N/A 8/19/2022    Procedure: COLONOSCOPY WITH POLYPECTOMY;  Surgeon: Janes Pickard MD;  Location: [facility] ENDOSCOPY;  Service: Gastroenterology;  Laterality: N/A;  COLON POLYP, DIVERTICULOSIS    ENDOSCOPY  08/09/2013        HIP ARTHROPLASTY Left     HIP BIPOLAR REPLACEMENT Left     OTHER SURGICAL HISTORY      ARTIFICAL JOINTS/LIMBS     OTHER SURGICAL HISTORY      JOINT SURGERY    OTHER SURGICAL HISTORY      I & D for osteomylitis right foot          SOC  Social History     Socioeconomic History    Marital status:    Tobacco Use    Smoking status: Never    Smokeless tobacco: Never   Vaping Use    Vaping Use: Never used   Substance and Sexual Activity    Alcohol use: Yes     Comment: occasionally    Drug use:  "Never    Sexual activity: Yes     Partners: Female     Birth control/protection: None         FAMHX  Family History   Problem Relation Age of Onset    Stroke Mother     Arthritis Mother     Prostate cancer Father 69    Heart disease Father     Colon cancer Paternal Grandmother 60    Colon cancer Paternal Aunt     Colon cancer Paternal Uncle           MEDSIGONLY  Current Outpatient Medications on File Prior to Visit   Medication Sig    allopurinol (ZYLOPRIM) 300 MG tablet Take 1 tablet by mouth Daily.    aspirin 81 MG EC tablet Aspir-81 81 mg oral tablet,delayed release (DR/EC) take 1 tablet (81 mg) by oral route once daily   Active    candesartan (ATACAND) 16 MG tablet Take  1 1/2 tablet daily (Patient taking differently: 1.5 tablets Daily.)    cholecalciferol (VITAMIN D3) 25 MCG (1000 UT) tablet Take 1 tablet by mouth Daily.    colestipol (COLESTID) 5 g granules Take 1 g by mouth.    glucosamine-chondroitin 500-400 MG capsule capsule Take  by mouth 3 (Three) Times a Day With Meals.    Loratadine 10 MG capsule As Needed.    meloxicam (MOBIC) 15 MG tablet Take 1 tablet by mouth Daily.    metoprolol succinate XL (Toprol XL) 50 MG 24 hr tablet Take 1.5 tablets by mouth Daily. Take  1 1/2 tablets by mouth every night    Omega-3 Fatty Acids (fish oil) 1000 MG capsule capsule Take 1 capsule by mouth Daily With Breakfast.    pantoprazole (PROTONIX) 40 MG EC tablet Take 1 tablet by mouth Daily.    rosuvastatin (CRESTOR) 20 MG tablet Take 1 tablet by mouth Daily.    Turmeric 1053 MG tablet turmeric 400 mg oral capsule take 1 capsule by oral route daily   Active     No current facility-administered medications on file prior to visit.         Objective   /70   Pulse 66   Ht 170.2 cm (67\")   Wt 109 kg (240 lb)   BMI 37.59 kg/mý       Physical Exam  Constitutional:       Appearance: He is obese.   HENT:      Head: Normocephalic.   Neck:      Vascular: No carotid bruit.   Cardiovascular:      Rate and Rhythm: Normal " rate and regular rhythm.      Pulses: Normal pulses.      Heart sounds: Normal heart sounds. No murmur heard.  Pulmonary:      Effort: Pulmonary effort is normal.      Breath sounds: Normal breath sounds.   Musculoskeletal:      Cervical back: Neck supple.      Right lower leg: No edema.      Left lower leg: No edema.   Skin:     General: Skin is dry.   Neurological:      Mental Status: He is alert and oriented to person, place, and time.   Psychiatric:         Behavior: Behavior normal.     Result Review :   The following data was reviewed by: LON Storey on 08/16/2023:  No results found for: PROBNP  CMP          6/8/2023    09:34 8/9/2023    07:27   CMP   Glucose 101     BUN 15     Creatinine 1.13     EGFR 73.5     Sodium 141     Potassium 4.8     Chloride 106     Calcium 9.8     Total Protein 7.3  6.5    Albumin 4.1  3.6    Globulin 3.2     Total Bilirubin 0.7  0.4    Alkaline Phosphatase 73  65    AST (SGOT) 20  21    ALT (SGPT) 24  39    Albumin/Globulin Ratio 1.3     BUN/Creatinine Ratio 13.3     Anion Gap 10.0       CBC w/diff          7/28/2023    09:52 8/9/2023    07:27   CBC w/Diff   WBC     RBC     Hemoglobin 16.3  15.4    Hematocrit 47.8  45.2    MCV     MCH     MCHC     RDW     Platelets     Neutrophil Rel %     Immature Granulocyte Rel %     Lymphocyte Rel %     Monocyte Rel %     Eosinophil Rel %     Basophil Rel %        Lab Results   Component Value Date    TSH 1.750 12/07/2021      Lab Results   Component Value Date    FREET4 1.09 12/07/2021      No results found for: DDIMERQUANT  Magnesium   Date Value Ref Range Status   06/09/2022 2.0 1.6 - 2.4 mg/dL Final      No results found for: DIGOXIN   Lab Results   Component Value Date    TROPONINT <0.01 05/05/2019           Lipid Panel          8/9/2023    07:27   Lipid Panel   Total Cholesterol 113    Triglycerides 116    HDL Cholesterol 27    VLDL Cholesterol 21    LDL Cholesterol  65    LDL/HDL Ratio 2.33      [id number] Score: 2       Assessment and Plan    Diagnoses and all orders for this visit:    1. Paroxysmal atrial fibrillation (Primary)  Symptomatically stable, rate controlled, continue metoprolol 75 mg daily. He is on aspirin only 81 daily for CVA prevention given his relatively low Chadsvasc score and only one episode of A-fib occurring back in 2018.    2. Hypertension, essential  Currently controlled and without adverse effects from medication, continue candesartan 24 mg daily.     3. Mixed hyperlipidemia  Last lipid panel was 08/09/23 with LDL 65 which is within goal range, continue Crestor 20 mg daily.     4. Carotid atherosclerosis, bilateral  Asymptomatic, continue aspirin 81 mg daily.           Follow Up   Return in about 6 months (around 2/16/2024) for Follow up with Dr Lr.    Patient was given instructions and counseling regarding his condition or for health maintenance advice. Please see specific information pulled into the AVS if appropriate.     Paolo Goldstein  reports that he has never smoked. He has never used smokeless tobacco.           LON Storey  08/16/23  08:45 EDT    Dictated Utilizing Dragon Dictation

## 2023-09-05 ENCOUNTER — TELEPHONE (OUTPATIENT)
Dept: CARDIOLOGY | Facility: CLINIC | Age: 63
End: 2023-09-05
Payer: COMMERCIAL

## 2023-09-05 DIAGNOSIS — I10 HYPERTENSION, ESSENTIAL: Chronic | ICD-10-CM

## 2023-09-05 RX ORDER — CANDESARTAN 16 MG/1
TABLET ORAL
Qty: 135 TABLET | Refills: 3 | Status: SHIPPED | OUTPATIENT
Start: 2023-09-05

## 2023-09-05 NOTE — TELEPHONE ENCOUNTER
The [facility] received a fax that requires your attention. The document has been indexed to the patient’s chart for your review.      Reason for sending: EXTERNAL MEDICAL RECORD NOTIFICATION     Documents Description: MEDS-CANDESARTAN REFILL-9.5.23    Name of Sender: KRISTI     Date Indexed: 9.5.23

## 2023-09-11 RX ORDER — ALLOPURINOL 300 MG/1
300 TABLET ORAL DAILY
Qty: 30 TABLET | Refills: 5 | Status: SHIPPED | OUTPATIENT
Start: 2023-09-11

## 2023-10-04 ENCOUNTER — OFFICE VISIT (OUTPATIENT)
Dept: SLEEP MEDICINE | Facility: HOSPITAL | Age: 63
End: 2023-10-04
Payer: COMMERCIAL

## 2023-10-04 VITALS
WEIGHT: 233.8 LBS | BODY MASS INDEX: 36.7 KG/M2 | HEIGHT: 67 IN | DIASTOLIC BLOOD PRESSURE: 78 MMHG | SYSTOLIC BLOOD PRESSURE: 140 MMHG | HEART RATE: 64 BPM | OXYGEN SATURATION: 95 %

## 2023-10-04 DIAGNOSIS — D47.2 MONOCLONAL GAMMOPATHY OF UNDETERMINED SIGNIFICANCE: ICD-10-CM

## 2023-10-04 DIAGNOSIS — G47.10 HYPERSOMNIA: ICD-10-CM

## 2023-10-04 DIAGNOSIS — D75.1 POLYCYTHEMIA: ICD-10-CM

## 2023-10-04 DIAGNOSIS — E66.9 OBESITY (BMI 30-39.9): ICD-10-CM

## 2023-10-04 DIAGNOSIS — Z72.821 POOR SLEEP HYGIENE: ICD-10-CM

## 2023-10-04 DIAGNOSIS — I48.0 PAROXYSMAL ATRIAL FIBRILLATION: ICD-10-CM

## 2023-10-04 DIAGNOSIS — R29.818 SUSPECTED SLEEP APNEA: Primary | ICD-10-CM

## 2023-10-04 DIAGNOSIS — F51.04 PSYCHOPHYSIOLOGICAL INSOMNIA: ICD-10-CM

## 2023-10-04 PROCEDURE — G0463 HOSPITAL OUTPT CLINIC VISIT: HCPCS

## 2023-10-04 RX ORDER — UREA 10 %
LOTION (ML) TOPICAL
COMMUNITY

## 2023-10-04 RX ORDER — ZOLPIDEM TARTRATE 5 MG/1
5 TABLET ORAL NIGHTLY PRN
Qty: 2 TABLET | Refills: 0 | Status: SHIPPED | OUTPATIENT
Start: 2023-10-04

## 2023-10-04 NOTE — PROGRESS NOTES
Sleep Consultation    Patient Name: Paolo Goldstein  Age/Sex: 63 y.o. male  : 1960  MRN: [id number]    Date of Encounter Visit: 10/04/2023  Encounter Provider: Donna Saucedo MD  Referring Provider: Stanford Howe, *  Place of Service: [facility]  Patient Care Team:  Stanford Howe MD as PCP - General (Internal Medicine)    Subjective:     Reason for Consult: JAMEE    History of Present Illness:  Paolo Goldstein is a 63 y.o. male is here for evaluation of JAMEE  .    Patient complains of daytime fatigue and sleepiness with an Pateros Sleepiness Scale (ESS) of 13   Patient complains of the daytime fatigue and sleepiness with occasional difficulty driving because of sleepiness.  Patient has morning headache and waking up with a sore throat but he is not sure about the snoring  Patient has frequent leg jerking at night but no discomfort in the legs that keeps him from going to sleep  Patient has night sweats on and off and some nocturnal reflux symptoms  Patient is reporting difficulty staying asleep with frequent awakenings and minimal nocturia with 1-2 trip to the restroom per night with overall restless sleep   Denies any symptoms of restless leg syndrome.   Patient denies any cataplexy, sleep paralysis or other symptoms to suggest narcolepsy.  Patient denies any parasomnias.  Denies any history of seizure disorder or recent head trauma.  Patient does not spend adequate time in bed sitting sleep.  The bedtime is around 10 to 11 PM wake up time is around 3 in the morning may sleep until 5 on the weekends, patient reports 4 hours of sleep in between, and wakes up feeling very tired  He wakes up at 2 to 3 am without the alarm and can not go back to sleep, he may take a nap in the afternoon   Comorbidities include: Hypertension, hyperlipidemia, paroxysmal atrial fibrillation, polycythemia (familial type, requiring phlebotomies) and monoclonal gammopathy.    Review of  Systems:   A twelve-system review was conducted and was negative except for the following: Occasional dysphagia heartburn.        Past Medical History:  Past Medical History:   Diagnosis Date    A-fib     Acid reflux     Arthritis     Carotid atherosclerosis, bilateral 12/09/2021    Colon polyps     Essential hypertension     Family history of colon cancer     Fatty liver     GERD (gastroesophageal reflux disease)     Hip pain     LEFT     Insomnia     MGUS (monoclonal gammopathy of unknown significance)     Pes cavus of left foot 01/12/2019    Primary osteoarthritis of left foot 01/12/2019    Sciatic nerve pain        Past Surgical History:   Procedure Laterality Date    ANKLE SURGERY Right 1977    RIGHT ANKLE CLOSED REDUCTION     CARDIOVERSION      CHOLECYSTECTOMY      LAPAROSCOPIC    COLONOSCOPY  04/21/2017    DR. PICKARD    COLONOSCOPY N/A 8/19/2022    Procedure: COLONOSCOPY WITH POLYPECTOMY;  Surgeon: Janes Pickard MD;  Location: [facility] ENDOS[facility];  Service: Gastroenterology;  Laterality: N/A;  COLON POLYP, DIVERTICULOSIS    ENDOSCOPY  08/09/2013        HIP ARTHROPLASTY Left     HIP BIPOLAR REPLACEMENT Left     OTHER SURGICAL HISTORY      ARTIFICAL JOINTS/LIMBS     OTHER SURGICAL HISTORY      JOINT SURGERY    OTHER SURGICAL HISTORY      I & D for osteomylitis right foot       Home Medications:     Current Outpatient Medications:     aspirin 81 MG EC tablet, Aspir-81 81 mg oral tablet,delayed release (DR/EC) take 1 tablet (81 mg) by oral route once daily   Active, Disp: , Rfl:     candesartan (ATACAND) 16 MG tablet, Take  1 1/2 tablet daily, Disp: 135 tablet, Rfl: 3    melatonin 1 MG tablet, Take  by mouth., Disp: , Rfl:     metoprolol succinate XL (Toprol XL) 50 MG 24 hr tablet, Take 1.5 tablets by mouth Daily. Take  1 1/2 tablets by mouth every night, Disp: 135 tablet, Rfl: 3    RESVERATROL PO, Take  by mouth., Disp: , Rfl:     rosuvastatin (CRESTOR) 20 MG tablet, Take 1 tablet by mouth Daily.,  "Disp: 90 tablet, Rfl: 3    Turmeric 1053 MG tablet, turmeric 400 mg oral capsule take 1 capsule by oral route daily   Active, Disp: , Rfl:     allopurinol (ZYLOPRIM) 300 MG tablet, TAKE 1 TABLET BY MOUTH DAILY, Disp: 30 tablet, Rfl: 5    cholecalciferol (VITAMIN D3) 25 MCG (1000 UT) tablet, Take 1 tablet by mouth Daily., Disp: , Rfl:     colestipol (COLESTID) 5 g granules, Take 1 g by mouth., Disp: , Rfl:     glucosamine-chondroitin 500-400 MG capsule capsule, Take  by mouth 3 (Three) Times a Day With Meals., Disp: , Rfl:     Loratadine 10 MG capsule, As Needed., Disp: , Rfl:     meloxicam (MOBIC) 15 MG tablet, Take 1 tablet by mouth Daily., Disp: 30 tablet, Rfl: 2    Omega-3 Fatty Acids (fish oil) 1000 MG capsule capsule, Take 1 capsule by mouth Daily With Breakfast., Disp: , Rfl:     pantoprazole (PROTONIX) 40 MG EC tablet, Take 1 tablet by mouth Daily., Disp: 30 tablet, Rfl: 5    zolpidem (Ambien) 5 MG tablet, Take 1 tablet by mouth At Night As Needed for Sleep., Disp: 2 tablet, Rfl: 0    Allergies:  No Known Allergies    Past Social History:  Social History     Socioeconomic History    Marital status:    Tobacco Use    Smoking status: Never    Smokeless tobacco: Never   Vaping Use    Vaping Use: Never used   Substance and Sexual Activity    Alcohol use: Yes     Comment: occasionally    Drug use: Never    Sexual activity: Yes     Partners: Female     Birth control/protection: None       Past Family History:  Family History   Problem Relation Age of Onset    Stroke Mother     Arthritis Mother     Thyroid disease Mother     Prostate cancer Father 69    Heart disease Father     Colon cancer Paternal Aunt     Colon cancer Paternal Uncle     Colon cancer Paternal Grandmother 60        Objective:        Vital Signs:   Visit Vitals  /78   Pulse 64   Ht 170.2 cm (67\")   Wt 106 kg (233 lb 12.8 oz)   SpO2 95%   BMI 36.62 kg/m²     Wt Readings from Last 3 Encounters:   10/04/23 106 kg (233 lb 12.8 oz) "   08/16/23 109 kg (240 lb)   07/28/23 108 kg (237 lb 10.5 oz)     Neck Circumference: 16 inches    Physical Exam:   GEN:  No acute distress, alert, cooperative, well developed   EYES:   Sclerae clear. No icterus. PERRL. Normal EOM  ENT:   External ears/nose normal, no oral lesions, no thrush, mucous membranes moist, Septum midline. Mallampati IV airway. Normal uvula    NECK:  Supple, midline trachea, no JVD  LUNGS: Normal chest on inspection, CTAB, no wheezes. No rhonchi. No crackles. Respirations regular, even and unlabored.   CV:  Regular rhythm and rate. Normal S1/S2. No murmurs, gallops, or rubs noted.  ABD:  Soft, nontender and nondistended. Normal bowel sounds. No guarding  EXT:  Moves all extremities well. No cyanosis. No redness. 1+ edema.   Skin: Dry, intact, no bleeding      Diagnostic Data:  No prior sleep study     Assessment and Plan:       ICD-10-CM ICD-9-CM   1. Suspected sleep apnea  R29.818 781.99   2. Hypersomnia  G47.10 780.54   3. Psychophysiological insomnia  F51.04 307.42   4. Poor sleep hygiene  Z72.821 307.49   5. Obesity (BMI 30-39.9)  E66.9 278.00   6. Paroxysmal atrial fibrillation  I48.0 427.31   7. Polycythemia  D75.1 238.4   8. Monoclonal gammopathy of undetermined significance  D47.2 273.1       Recommendations:        He does agree to proceed with HST.   Ambien 5 mg 2 tablets to take the night of the HST  Patient has issues with Insomnia, and does nap daily.   Written education provided for sleep hygiene tactics.  Patient has polycythemia and he is already followed by hematology, not sure if he has significant hypoxemia as a possible factor but that would be evaluated on the home sleep study and this was actually the reason for his referral by his hematologist.  We did discuss the need to avoid the nap in the afternoons and other measures to help with the sleep hygiene and trying to consolidate more sleep at night  We will discuss the option of adding a hypnotic on the follow-up visit  if no other abnormalities were found      Patient was educated in depth about JAMEE and cardiovascular consequences if left untreated, including but not limited to CHF, CAD, arrhythmias, CVA, and/or HTN. Education also provided about the diagnostic tools for JAMEE, including the polysomnography and the treatment modalities, including the CPAP.     If patient has obstructive sleep apnea the recommend treatment is CPAP and will start CPAP and patient will follow up within 31-90 days after starting CPAP for compliance review.   Will address alternative treatment option if intolerant to CPAP     Adherence to the CPAP is a key factor in successful treatment of JAMEE and the patient was encouraged to contact us in case of problem with the CPAP or the mask that can limit the tolerance of the compliance with the therapy.    Patient was educated about the impact of obesity on sleep apnea and the benefit of weight loss and weight loss was recommended    Patient was educated about the risk of driving while sleepy and was strongly encouraged to avoid any  driving if sleepy given the high risk of motor vehicle accidents and injury to self and other pedestrians/vehicles.     Orders Placed This Encounter   Procedures    Home Sleep Study     New Medications Ordered This Visit   Medications    zolpidem (Ambien) 5 MG tablet     Sig: Take 1 tablet by mouth At Night As Needed for Sleep.     Dispense:  2 tablet     Refill:  0      Return in about 4 weeks (around 11/1/2023).    Donna Saucedo MD   [facility]   10/04/23  09:32 EDT    Dictated utilizing Dragon dictation

## 2023-10-10 ENCOUNTER — OFFICE VISIT (OUTPATIENT)
Dept: ORTHOPEDIC SURGERY | Facility: CLINIC | Age: 63
End: 2023-10-10
Payer: COMMERCIAL

## 2023-10-10 VITALS
HEART RATE: 59 BPM | HEIGHT: 67 IN | SYSTOLIC BLOOD PRESSURE: 149 MMHG | WEIGHT: 241 LBS | BODY MASS INDEX: 37.83 KG/M2 | DIASTOLIC BLOOD PRESSURE: 94 MMHG | OXYGEN SATURATION: 96 %

## 2023-10-10 DIAGNOSIS — M70.61 TROCHANTERIC BURSITIS OF RIGHT HIP: Primary | ICD-10-CM

## 2023-10-10 RX ADMIN — TRIAMCINOLONE ACETONIDE 40 MG: 40 INJECTION, SUSPENSION INTRA-ARTICULAR; INTRAMUSCULAR at 10:20

## 2023-10-10 RX ADMIN — LIDOCAINE HYDROCHLORIDE 5 ML: 10 INJECTION, SOLUTION INFILTRATION; PERINEURAL at 10:20

## 2023-10-13 ENCOUNTER — HOSPITAL ENCOUNTER (OUTPATIENT)
Dept: SLEEP MEDICINE | Facility: HOSPITAL | Age: 63
Discharge: HOME | End: 2023-10-13
Admitting: INTERNAL MEDICINE
Payer: COMMERCIAL

## 2023-10-13 DIAGNOSIS — F51.04 PSYCHOPHYSIOLOGICAL INSOMNIA: ICD-10-CM

## 2023-10-13 DIAGNOSIS — G47.10 HYPERSOMNIA: ICD-10-CM

## 2023-10-13 DIAGNOSIS — E66.9 OBESITY (BMI 30-39.9): ICD-10-CM

## 2023-10-13 DIAGNOSIS — Z72.821 POOR SLEEP HYGIENE: ICD-10-CM

## 2023-10-13 DIAGNOSIS — R29.818 SUSPECTED SLEEP APNEA: ICD-10-CM

## 2023-10-13 PROCEDURE — 95806 SLEEP STUDY UNATT&RESP EFFT: CPT

## 2023-10-13 RX ORDER — LIDOCAINE HYDROCHLORIDE 10 MG/ML
5 INJECTION, SOLUTION INFILTRATION; PERINEURAL
Status: COMPLETED | OUTPATIENT
Start: 2023-10-10 | End: 2023-10-10

## 2023-10-13 RX ORDER — TRIAMCINOLONE ACETONIDE 40 MG/ML
40 INJECTION, SUSPENSION INTRA-ARTICULAR; INTRAMUSCULAR
Status: COMPLETED | OUTPATIENT
Start: 2023-10-10 | End: 2023-10-10

## 2023-10-13 NOTE — PATIENT INSTRUCTIONS
Right hip trochanteric bursitis steroid injection administered in office today and tolerated the procedure well without complications.  Patient advised on 3 months duration between injections.     Follow-up as needed for worsening symptoms.  Call with questions, concerns or worsening symptoms.

## 2023-10-17 DIAGNOSIS — G47.33 OSA (OBSTRUCTIVE SLEEP APNEA): Primary | ICD-10-CM

## 2023-10-18 ENCOUNTER — TELEPHONE (OUTPATIENT)
Dept: SLEEP MEDICINE | Facility: HOSPITAL | Age: 63
End: 2023-10-18
Payer: COMMERCIAL

## 2023-10-26 ENCOUNTER — TELEPHONE (OUTPATIENT)
Dept: ORTHOPEDIC SURGERY | Facility: CLINIC | Age: 63
End: 2023-10-26

## 2023-10-26 DIAGNOSIS — M25.551 RIGHT HIP PAIN: Primary | ICD-10-CM

## 2023-10-26 NOTE — TELEPHONE ENCOUNTER
Caller: Paolo Goldstein    Relationship: Self    Best call back number: [phone]    What form or medical record are you requesting: NEW REFERRAL FOR P/T FOR RIGHT HIP    Who is requesting this form or medical record from you: [facility] [place]    How would you like to receive the form or medical records (pick-up, mail, fax): FAX  If fax, what is the fax number: PATIENT DID NOT HAVE FAX #    Timeframe paperwork needed: ASAP    Additional notes: PATIENT STATES HIS HIP IS DOING WELL SINCE HIS LAST INJECTION, BUT HE IS STILL HAVING SOME PAIN. HE WOULD LIKE TO HAVE MORE P/T TO HELP WITH THIS

## 2023-11-03 ENCOUNTER — TELEPHONE (OUTPATIENT)
Dept: CARDIOLOGY | Facility: CLINIC | Age: 63
End: 2023-11-03
Payer: COMMERCIAL

## 2023-11-03 RX ORDER — EZETIMIBE 10 MG/1
10 TABLET ORAL DAILY
Qty: 90 TABLET | Refills: 3 | Status: SHIPPED | OUTPATIENT
Start: 2023-11-03

## 2023-11-03 RX ORDER — ROSUVASTATIN CALCIUM 10 MG/1
10 TABLET, COATED ORAL DAILY
Qty: 90 TABLET | Refills: 3 | Status: SHIPPED | OUTPATIENT
Start: 2023-11-03

## 2023-11-03 NOTE — TELEPHONE ENCOUNTER
Since increasing crestor to 20 mg , pt has had muscle aches and cramps. He wants to know if he can go back to crestor 10 mg and add Zetia.

## 2023-11-28 ENCOUNTER — OFFICE VISIT (OUTPATIENT)
Dept: ORTHOPEDIC SURGERY | Facility: CLINIC | Age: 63
End: 2023-11-28
Payer: COMMERCIAL

## 2023-11-28 VITALS
OXYGEN SATURATION: 96 % | WEIGHT: 235 LBS | SYSTOLIC BLOOD PRESSURE: 153 MMHG | HEIGHT: 67 IN | BODY MASS INDEX: 36.88 KG/M2 | HEART RATE: 95 BPM | DIASTOLIC BLOOD PRESSURE: 80 MMHG

## 2023-11-28 DIAGNOSIS — M54.50 LUMBAR PAIN: ICD-10-CM

## 2023-11-28 DIAGNOSIS — M25.551 RIGHT HIP PAIN: Primary | ICD-10-CM

## 2023-11-28 NOTE — PROGRESS NOTES
"Chief Complaint  Follow-up of the Right Hip     Subjective      Paolo Goldstein presents to [facility] for follow of the right hip. He had a hip bursitis injection on 10/10/23.  He has been in physical therapy and his hip feels better but still notes quad weakness. He is having pain in the gluteal, lateral aspect of the hip and down the quad to the knee. He has minimal groin pain.     No Known Allergies     Social History     Socioeconomic History    Marital status:    Tobacco Use    Smoking status: Never    Smokeless tobacco: Never   Vaping Use    Vaping Use: Never used   Substance and Sexual Activity    Alcohol use: Yes     Comment: occasionally    Drug use: Never    Sexual activity: Yes     Partners: Female     Birth control/protection: None        I reviewed the patient's chief complaint, history of present illness, review of systems, past medical history, surgical history, family history, social history, medications, and allergy list.     Review of Systems     Constitutional: Denies fevers, chills, weight loss  Cardiovascular: Denies chest pain, shortness of breath  Skin: Denies rashes, acute skin changes  Neurologic: Denies headache, loss of consciousness        Vital Signs:   /80 (BP Location: Right arm, Patient Position: Sitting, Cuff Size: Adult)   Pulse 95   Ht 170.2 cm (67\")   Wt 107 kg (235 lb)   SpO2 96%   BMI 36.81 kg/m²          Physical Exam  General: Alert. No acute distress    Ortho Exam        RIGHT HIP  No skin discoloration, atrophy or swelling. Positive EHL, FHL, GS, and TA. Sensation intact to all 5 nerves of the foot. Negative straight leg raise. Leg lengths appear equal. Ambulates with Non-antalgic gait Negative Yeni. Negative Dalia. Good strength to hamstrings, quadriceps, dorsiflexors, and plantar flexors. Knee Extensor Mechanism  intact He has no pain with movement of the hip for flexion and rotation.     Procedures      Imaging Results " (Most Recent)       None             Result Review :           Assessment and Plan     Diagnoses and all orders for this visit:    1. Right hip pain (Primary)    2. Lumbar pain        Discussed the treatment plan with the patient.     Continue physical therapy.     MRI of the right hip and lumbar spine to assess the structure.       Call or return if worsening symptoms.    Follow Up     After MRI of the right hip and lumbar spine    Patient was given instructions and counseling regarding his condition or for health maintenance advice. Please see specific information pulled into the AVS if appropriate.     Scribed for Cassius Reilly MD by Carmel Pate MA.  11/28/23   08:00 EST    I have personally performed the services described in this document as scribed by the above individual and it is both accurate and complete. Cassius Reilly MD 11/28/23

## 2023-12-01 ENCOUNTER — TELEPHONE (OUTPATIENT)
Dept: ORTHOPEDIC SURGERY | Facility: CLINIC | Age: 63
End: 2023-12-01

## 2023-12-01 NOTE — TELEPHONE ENCOUNTER
Caller: Paolo Goldstein    Relationship: Self    Best call back number: [phone]    What orders are you requesting (i.e. lab or imaging): MRI RIGHT HIP     In what timeframe would the patient need to come in: ASAP BEFORE THE END OF THE YEAR     Where will you receive your lab/imaging services: [facility]     Additional notes: JOCE CANNOT WORK THE PATIENT IN UNTIL NEXT YEAR

## 2023-12-08 ENCOUNTER — LAB (OUTPATIENT)
Dept: LAB | Facility: HOSPITAL | Age: 63
End: 2023-12-08
Payer: COMMERCIAL

## 2023-12-08 DIAGNOSIS — D47.2 MONOCLONAL GAMMOPATHY OF UNDETERMINED SIGNIFICANCE: ICD-10-CM

## 2023-12-08 DIAGNOSIS — D75.1 POLYCYTHEMIA: ICD-10-CM

## 2023-12-08 DIAGNOSIS — G47.33 OSA (OBSTRUCTIVE SLEEP APNEA): ICD-10-CM

## 2023-12-08 DIAGNOSIS — I48.0 PAROXYSMAL ATRIAL FIBRILLATION: ICD-10-CM

## 2023-12-08 DIAGNOSIS — R73.01 IFG (IMPAIRED FASTING GLUCOSE): ICD-10-CM

## 2023-12-08 DIAGNOSIS — I65.29 STENOSIS OF CAROTID ARTERY, UNSPECIFIED LATERALITY: ICD-10-CM

## 2023-12-08 DIAGNOSIS — N18.31 STAGE 3A CHRONIC KIDNEY DISEASE: ICD-10-CM

## 2023-12-08 DIAGNOSIS — R74.8 ELEVATED LIVER ENZYMES: ICD-10-CM

## 2023-12-08 DIAGNOSIS — Z12.5 SCREENING PSA (PROSTATE SPECIFIC ANTIGEN): ICD-10-CM

## 2023-12-08 DIAGNOSIS — I50.9 CONGESTIVE HEART FAILURE, UNSPECIFIED HF CHRONICITY, UNSPECIFIED HEART FAILURE TYPE: ICD-10-CM

## 2023-12-08 DIAGNOSIS — K59.1 FUNCTIONAL DIARRHEA: ICD-10-CM

## 2023-12-08 DIAGNOSIS — M25.571 ACUTE RIGHT ANKLE PAIN: ICD-10-CM

## 2023-12-08 DIAGNOSIS — M86.9 OSTEOMYELITIS OF ANKLE AND FOOT: ICD-10-CM

## 2023-12-08 DIAGNOSIS — I10 HYPERTENSION, ESSENTIAL: ICD-10-CM

## 2023-12-08 DIAGNOSIS — E78.2 MIXED HYPERLIPIDEMIA: ICD-10-CM

## 2023-12-08 DIAGNOSIS — M25.551 RIGHT HIP PAIN: ICD-10-CM

## 2023-12-08 DIAGNOSIS — M86.071 ACUTE HEMATOGENOUS OSTEOMYELITIS OF RIGHT FOOT: ICD-10-CM

## 2023-12-08 LAB
25(OH)D3 SERPL-MCNC: 32.4 NG/ML (ref 30–100)
ALBUMIN SERPL-MCNC: 4.1 G/DL (ref 3.5–5.2)
ALBUMIN/GLOB SERPL: 1.5 G/DL
ALP SERPL-CCNC: 66 U/L (ref 39–117)
ALT SERPL W P-5'-P-CCNC: 28 U/L (ref 1–41)
ANION GAP SERPL CALCULATED.3IONS-SCNC: 9.5 MMOL/L (ref 5–15)
AST SERPL-CCNC: 24 U/L (ref 1–40)
BASOPHILS # BLD AUTO: 0.07 10*3/MM3 (ref 0–0.2)
BASOPHILS NFR BLD AUTO: 0.9 % (ref 0–1.5)
BILIRUB SERPL-MCNC: 0.5 MG/DL (ref 0–1.2)
BUN SERPL-MCNC: 15 MG/DL (ref 8–23)
BUN/CREAT SERPL: 11.5 (ref 7–25)
CALCIUM SPEC-SCNC: 9.5 MG/DL (ref 8.6–10.5)
CHLORIDE SERPL-SCNC: 108 MMOL/L (ref 98–107)
CHOLEST SERPL-MCNC: 119 MG/DL (ref 0–200)
CO2 SERPL-SCNC: 24.5 MMOL/L (ref 22–29)
CREAT SERPL-MCNC: 1.31 MG/DL (ref 0.76–1.27)
CRP SERPL-MCNC: <0.3 MG/DL (ref 0–0.5)
DEPRECATED RDW RBC AUTO: 44 FL (ref 37–54)
EGFRCR SERPLBLD CKD-EPI 2021: 61.2 ML/MIN/1.73
EOSINOPHIL # BLD AUTO: 0.34 10*3/MM3 (ref 0–0.4)
EOSINOPHIL NFR BLD AUTO: 4.2 % (ref 0.3–6.2)
ERYTHROCYTE [DISTWIDTH] IN BLOOD BY AUTOMATED COUNT: 12.6 % (ref 12.3–15.4)
ERYTHROCYTE [SEDIMENTATION RATE] IN BLOOD: 15 MM/HR (ref 0–20)
FOLATE SERPL-MCNC: 8.33 NG/ML (ref 4.78–24.2)
GLOBULIN UR ELPH-MCNC: 2.7 GM/DL
GLUCOSE SERPL-MCNC: 104 MG/DL (ref 65–99)
HBA1C MFR BLD: 5.7 % (ref 4.8–5.6)
HCT VFR BLD AUTO: 50 % (ref 37.5–51)
HDLC SERPL-MCNC: 33 MG/DL (ref 40–60)
HGB BLD-MCNC: 17.4 G/DL (ref 13–17.7)
IMM GRANULOCYTES # BLD AUTO: 0.04 10*3/MM3 (ref 0–0.05)
IMM GRANULOCYTES NFR BLD AUTO: 0.5 % (ref 0–0.5)
LDLC SERPL CALC-MCNC: 72 MG/DL (ref 0–100)
LDLC/HDLC SERPL: 2.21 {RATIO}
LYMPHOCYTES # BLD AUTO: 1.7 10*3/MM3 (ref 0.7–3.1)
LYMPHOCYTES NFR BLD AUTO: 20.9 % (ref 19.6–45.3)
MCH RBC QN AUTO: 33 PG (ref 26.6–33)
MCHC RBC AUTO-ENTMCNC: 34.8 G/DL (ref 31.5–35.7)
MCV RBC AUTO: 94.7 FL (ref 79–97)
MONOCYTES # BLD AUTO: 0.7 10*3/MM3 (ref 0.1–0.9)
MONOCYTES NFR BLD AUTO: 8.6 % (ref 5–12)
NEUTROPHILS NFR BLD AUTO: 5.3 10*3/MM3 (ref 1.7–7)
NEUTROPHILS NFR BLD AUTO: 64.9 % (ref 42.7–76)
NRBC BLD AUTO-RTO: 0 /100 WBC (ref 0–0.2)
PLATELET # BLD AUTO: 187 10*3/MM3 (ref 140–450)
PMV BLD AUTO: 11.1 FL (ref 6–12)
POTASSIUM SERPL-SCNC: 4.7 MMOL/L (ref 3.5–5.2)
PROT SERPL-MCNC: 6.8 G/DL (ref 6–8.5)
PSA SERPL-MCNC: 0.86 NG/ML (ref 0–4)
RBC # BLD AUTO: 5.28 10*6/MM3 (ref 4.14–5.8)
SODIUM SERPL-SCNC: 142 MMOL/L (ref 136–145)
TRIGL SERPL-MCNC: 65 MG/DL (ref 0–150)
VIT B12 BLD-MCNC: 413 PG/ML (ref 211–946)
VLDLC SERPL-MCNC: 14 MG/DL (ref 5–40)
WBC NRBC COR # BLD AUTO: 8.15 10*3/MM3 (ref 3.4–10.8)

## 2023-12-08 PROCEDURE — 82746 ASSAY OF FOLIC ACID SERUM: CPT

## 2023-12-08 PROCEDURE — 80053 COMPREHEN METABOLIC PANEL: CPT

## 2023-12-08 PROCEDURE — 82306 VITAMIN D 25 HYDROXY: CPT

## 2023-12-08 PROCEDURE — 85025 COMPLETE CBC W/AUTO DIFF WBC: CPT

## 2023-12-08 PROCEDURE — 80061 LIPID PANEL: CPT

## 2023-12-08 PROCEDURE — 36415 COLL VENOUS BLD VENIPUNCTURE: CPT

## 2023-12-08 PROCEDURE — G0103 PSA SCREENING: HCPCS

## 2023-12-08 PROCEDURE — 86140 C-REACTIVE PROTEIN: CPT

## 2023-12-08 PROCEDURE — 85652 RBC SED RATE AUTOMATED: CPT

## 2023-12-08 PROCEDURE — 82607 VITAMIN B-12: CPT

## 2023-12-08 PROCEDURE — 83036 HEMOGLOBIN GLYCOSYLATED A1C: CPT

## 2023-12-11 ENCOUNTER — TELEPHONE (OUTPATIENT)
Dept: CARDIOLOGY | Facility: CLINIC | Age: 63
End: 2023-12-11
Payer: COMMERCIAL

## 2023-12-11 DIAGNOSIS — E78.2 MIXED HYPERLIPIDEMIA: Primary | ICD-10-CM

## 2023-12-11 NOTE — TELEPHONE ENCOUNTER
----- Message from LON Roes sent at 12/11/2023  8:11 AM EST -----  Lipid panel shows LDL slightly outside of goal range of less than 70, make sure being compliant with crestor and zetia  Renal function is stable and electrolytes are good  Liver enzymes are good

## 2023-12-13 ENCOUNTER — OFFICE VISIT (OUTPATIENT)
Dept: INTERNAL MEDICINE | Facility: CLINIC | Age: 63
End: 2023-12-13
Payer: COMMERCIAL

## 2023-12-13 ENCOUNTER — OFFICE VISIT (OUTPATIENT)
Dept: SLEEP MEDICINE | Facility: HOSPITAL | Age: 63
End: 2023-12-13
Payer: COMMERCIAL

## 2023-12-13 VITALS
RESPIRATION RATE: 18 BRPM | OXYGEN SATURATION: 95 % | TEMPERATURE: 96.9 F | SYSTOLIC BLOOD PRESSURE: 125 MMHG | HEART RATE: 63 BPM | DIASTOLIC BLOOD PRESSURE: 84 MMHG | BODY MASS INDEX: 37.1 KG/M2 | HEIGHT: 67 IN | WEIGHT: 236.4 LBS

## 2023-12-13 VITALS
DIASTOLIC BLOOD PRESSURE: 78 MMHG | OXYGEN SATURATION: 94 % | SYSTOLIC BLOOD PRESSURE: 135 MMHG | HEART RATE: 60 BPM | WEIGHT: 236.3 LBS | HEIGHT: 67 IN | BODY MASS INDEX: 37.09 KG/M2

## 2023-12-13 DIAGNOSIS — I10 HYPERTENSION, ESSENTIAL: ICD-10-CM

## 2023-12-13 DIAGNOSIS — E66.9 OBESITY (BMI 30-39.9): ICD-10-CM

## 2023-12-13 DIAGNOSIS — Z12.5 SCREENING PSA (PROSTATE SPECIFIC ANTIGEN): ICD-10-CM

## 2023-12-13 DIAGNOSIS — R74.8 ELEVATED LIVER ENZYMES: ICD-10-CM

## 2023-12-13 DIAGNOSIS — D47.2 MONOCLONAL GAMMOPATHY OF UNDETERMINED SIGNIFICANCE: ICD-10-CM

## 2023-12-13 DIAGNOSIS — D75.1 POLYCYTHEMIA: ICD-10-CM

## 2023-12-13 DIAGNOSIS — G47.33 OSA ON CPAP: Primary | ICD-10-CM

## 2023-12-13 DIAGNOSIS — K21.9 GASTROESOPHAGEAL REFLUX DISEASE WITHOUT ESOPHAGITIS: ICD-10-CM

## 2023-12-13 DIAGNOSIS — E78.2 MIXED HYPERLIPIDEMIA: ICD-10-CM

## 2023-12-13 DIAGNOSIS — R73.01 IFG (IMPAIRED FASTING GLUCOSE): ICD-10-CM

## 2023-12-13 DIAGNOSIS — I48.0 PAROXYSMAL ATRIAL FIBRILLATION: ICD-10-CM

## 2023-12-13 DIAGNOSIS — I65.29 STENOSIS OF CAROTID ARTERY, UNSPECIFIED LATERALITY: ICD-10-CM

## 2023-12-13 DIAGNOSIS — Z00.00 PHYSICAL EXAM, ANNUAL: ICD-10-CM

## 2023-12-13 DIAGNOSIS — G47.10 HYPERSOMNIA: Primary | ICD-10-CM

## 2023-12-13 DIAGNOSIS — N18.31 STAGE 3A CHRONIC KIDNEY DISEASE: ICD-10-CM

## 2023-12-13 DIAGNOSIS — I65.23 CAROTID ATHEROSCLEROSIS, BILATERAL: ICD-10-CM

## 2023-12-13 DIAGNOSIS — G47.34 SLEEP RELATED HYPOXIA: ICD-10-CM

## 2023-12-13 PROCEDURE — G0463 HOSPITAL OUTPT CLINIC VISIT: HCPCS

## 2023-12-13 RX ORDER — PANTOPRAZOLE SODIUM 40 MG/1
40 TABLET, DELAYED RELEASE ORAL DAILY
Qty: 90 TABLET | Refills: 3 | Status: SHIPPED | OUTPATIENT
Start: 2023-12-13

## 2023-12-13 NOTE — PROGRESS NOTES
Sleep Disorder Follow Up    Patient Name: Paolo Goldstein  Age/Sex: 63 y.o. male  : 1960  MRN: [id number]    Date of Encounter Visit: 23   Referring Provider: Stanford Howe, *  Place of Service: [facility]  Patient Care Team:  Stanford Howe MD as PCP - General (Internal Medicine)    PROBLEM LIST:  JAMEE  Sleep-related hypoxemia  Hypertension  Paroxysmal atrial fibrillation  Hypersomnia    History of Present Illness:  Paolo Goldstein is a 63 y.o. male who is here for follow up on JAMEE. Patient was last seen in the office on 10/4/2023.  Since last visit, patient had a home sleep study on 10/15/2023 that showed moderate sleep apnea with hypoxemia and its CPAP was initiated and he is here for follow-up on the treatment.  Patient uses machine every night with no complaints from the mask or the pressure.  Patient uses a nasal mask, which does  fit well. Patient no significant air leak.  Minimal dry mouth.   Patient sleeps better and has a deeper sleep with the machine and feels more energy during the day time.  Currently on 5-15 cm H20.  [place] Sleepiness Scale (ESS) is 4 which is a significant improvement from ESS of 13 prior to treatment.  Compliance download was reviewed and documented below.  Weight is up by 3 pounds since last visit.  He is relatively sleep deprived however he did admit to sleep an extra hour off the CPAP in the morning  Overall he likes the response and he did report significant change in his daytime sleepiness   Other comorbidities include hypertension, hyperlipidemia, paroxysmal atrial fibrillation, polycythemia of the familial type, and monoclonal gammopathy    Review of Systems:   A ten-system review was conducted and was negative except for the following: Dry nose/mouth.        Past Medical History:  Past medical, surgical, social, and family history, except as mentioned above, was unchanged from the last visit.     Past Medical  History:   Diagnosis Date    A-fib     Acid reflux     Arthritis     Carotid atherosclerosis, bilateral 12/09/2021    Colon polyps     Essential hypertension     Family history of colon cancer     Fatty liver     GERD (gastroesophageal reflux disease)     Hip pain     LEFT     Insomnia     MGUS (monoclonal gammopathy of unknown significance)     Pes cavus of left foot 01/12/2019    Primary osteoarthritis of left foot 01/12/2019    Sciatic nerve pain        Past Surgical History:   Procedure Laterality Date    ANKLE SURGERY Right 1977    RIGHT ANKLE CLOSED REDUCTION     CARDIOVERSION      CHOLECYSTECTOMY      LAPAROSCOPIC    COLONOSCOPY  04/21/2017    DR. PICKARD    COLONOSCOPY N/A 8/19/2022    Procedure: COLONOSCOPY WITH POLYPECTOMY;  Surgeon: Janes Pickard MD;  Location: [facility] ENDOSCOPY;  Service: Gastroenterology;  Laterality: N/A;  COLON POLYP, DIVERTICULOSIS    ENDOSCOPY  08/09/2013        HIP ARTHROPLASTY Left     HIP BIPOLAR REPLACEMENT Left     OTHER SURGICAL HISTORY      ARTIFICAL JOINTS/LIMBS     OTHER SURGICAL HISTORY      JOINT SURGERY    OTHER SURGICAL HISTORY      I & D for osteomylitis right foot       Home Medications:     Current Outpatient Medications:     allopurinol (ZYLOPRIM) 300 MG tablet, TAKE 1 TABLET BY MOUTH DAILY, Disp: 30 tablet, Rfl: 5    aspirin 81 MG EC tablet, Aspir-81 81 mg oral tablet,delayed release (DR/EC) take 1 tablet (81 mg) by oral route once daily   Active, Disp: , Rfl:     candesartan (ATACAND) 16 MG tablet, Take  1 1/2 tablet daily, Disp: 135 tablet, Rfl: 3    cholecalciferol (VITAMIN D3) 25 MCG (1000 UT) tablet, Take 1 tablet by mouth Daily., Disp: , Rfl:     colestipol (COLESTID) 5 g granules, Take 1 g by mouth., Disp: , Rfl:     ezetimibe (ZETIA) 10 MG tablet, Take 1 tablet by mouth Daily., Disp: 90 tablet, Rfl: 3    glucosamine-chondroitin 500-400 MG capsule capsule, Take  by mouth 3 (Three) Times a Day With Meals., Disp: , Rfl:     Loratadine 10 MG  "capsule, As Needed., Disp: , Rfl:     melatonin 1 MG tablet, Take  by mouth., Disp: , Rfl:     meloxicam (MOBIC) 15 MG tablet, Take 1 tablet by mouth Daily., Disp: 30 tablet, Rfl: 2    metoprolol succinate XL (Toprol XL) 50 MG 24 hr tablet, Take 1.5 tablets by mouth Daily. Take  1 1/2 tablets by mouth every night, Disp: 135 tablet, Rfl: 3    Omega-3 Fatty Acids (fish oil) 1000 MG capsule capsule, Take 1 capsule by mouth Daily With Breakfast., Disp: , Rfl:     pantoprazole (PROTONIX) 40 MG EC tablet, Take 1 tablet by mouth Daily., Disp: 30 tablet, Rfl: 5    RESVERATROL PO, Take  by mouth., Disp: , Rfl:     rosuvastatin (CRESTOR) 10 MG tablet, Take 1 tablet by mouth Daily., Disp: 90 tablet, Rfl: 3    Turmeric 1053 MG tablet, turmeric 400 mg oral capsule take 1 capsule by oral route daily   Active, Disp: , Rfl:     zolpidem (Ambien) 5 MG tablet, Take 1 tablet by mouth At Night As Needed for Sleep., Disp: 2 tablet, Rfl: 0    Allergies:  No Known Allergies    Past Social History:  Social History     Socioeconomic History    Marital status:    Tobacco Use    Smoking status: Never    Smokeless tobacco: Never   Vaping Use    Vaping Use: Never used   Substance and Sexual Activity    Alcohol use: Yes     Comment: occasionally    Drug use: Never    Sexual activity: Yes     Partners: Female     Birth control/protection: None       Past Family History:  Family History   Problem Relation Age of Onset    Stroke Mother     Arthritis Mother     Thyroid disease Mother     Prostate cancer Father 69    Heart disease Father     Colon cancer Paternal Aunt     Colon cancer Paternal Uncle     Colon cancer Paternal Grandmother 60         Objective:        Vital Signs:   Visit Vitals  /78   Pulse 60   Ht 170.2 cm (67.01\")   Wt 107 kg (236 lb 4.8 oz)   SpO2 94%   BMI 37.00 kg/m²     Wt Readings from Last 3 Encounters:   12/13/23 107 kg (236 lb 4.8 oz)   11/28/23 107 kg (235 lb)   10/10/23 109 kg (241 lb)          Physical Exam: "   GEN:  No acute distress, alert, cooperative, well developed   EYES:   Sclerae clear. No icterus. PERRL. Normal EOM  ENT:   External ears/nose normal, no oral lesions, no thrush, mucous membranes moist, Septum midline. Mallampati IV airway. Normal uvula    NECK:  Supple, midline trachea, no JVD  LUNGS: Normal chest on inspection, CTAB, no wheezes. No rhonchi. No crackles. Respirations regular, even and unlabored.   CV:  Regular rhythm and rate. Normal S1/S2. No murmurs, gallops, or rubs noted.  ABD:  Soft, nontender and nondistended. Normal bowel sounds. No guarding  EXT:  Moves all extremities well. No cyanosis. No redness. 1+ edema.   Skin: Dry, intact, no bleeding    Diagnostic Data:  HST 10/15/2023:      Respiratory Disturbance Events:     This is a home sleep study done on 10/15/2023  Recorded weight on the night of study was 109.3 kg with a BMI of 37.7  Total recording time 289.9 minutes with a monitoring time of 280.5 minutes  Respiratory summary was positive for moderate JAMEE with AHI of 15.0, AHI with the highest in the left position at 28.0  Sleep-related hypoxemia noted with paul desaturation down to 82% with 8.1 minutes spent in the hypoxemic range below 90%  Percentage of snoring 37.2%  Heart rate was within normal range  Nonspecific clustering noted  Impression:    Moderate JAMEE  Mild sleep-related hypoxemia  Hypersomnia by history  Plan:   Treatment is recommended, initiate auto CPAP 5-15  Follow-up in the sleep center as ordered    Compliance download from 11/5/2023 - 12/4/2023 showed 87% adherence with average nightly use of 5 hours and 51 minutes on the nights used.  The patient is on auto CPAP 5-15 with a median/95th percentile pressure of 6.2/9.5 cm H2O  Leak is minimal with a median/95th percentile air leak of 0.4/17.7 L/min  Sleep apnea is very well-controlled with residual AHI of 0.6      Assessment and Plan:       ICD-10-CM ICD-9-CM   1. JAMEE on CPAP  G47.33 327.23   2. Sleep related hypoxia   G47.34 327.24   3. Obesity (BMI 30-39.9)  E66.9 278.00   4. Polycythemia  D75.1 238.4   5. Monoclonal gammopathy of undetermined significance  D47.2 273.1   6. Stage 3a chronic kidney disease  N18.31 585.3   7. Paroxysmal atrial fibrillation  I48.0 427.31       Recommendations:     Patient is compliant with the CPAP as evident from the compliance download  Patient is getting both clinical and subjective benefit from the use of the CPAP machine  The CPAP machine is effective in controlling the underlying sleep apnea  CPAP is strongly recommended to be continued  Patient to continue work on the weight loss  No pressure adjustment recommended  Continue with a yearly follow-up or sooner as needed  The patient reported that his hemoglobin count did not change much on follow-up, not sure how much is due to the control of the mild hypoxemia that he had on the sleep study but that is a welcomed response  Patient is using the nasal mask but he would like to try the nasal pillow and we will have his DME continue with the mask fitting until he finds the most comfortable mask  I did review with the patient in detail the results of the sleep study, the report from his compliance download and I did point all the abnormal finding and the clinical response on the CPAP.  He did have few questions that were answered to his satisfaction    Orders Placed This Encounter   Procedures    PAP Therapy     No orders of the defined types were placed in this encounter.    Return in about 1 year (around 12/13/2024).    Donna Saucedo MD   [facility]   12/13/23  09:22 EST    Dictated utilizing Dragon dictation

## 2023-12-13 NOTE — PROGRESS NOTES
"CHIEF COMPLAINT/ HPI:  Follow-up (6 month follow up/ labs done)  Patient's change or adjusted his cholesterol medicines recently, he is also started back on CPAP            Objective   Vital Signs  Vitals:    12/13/23 0939   BP: 125/84   BP Location: Left arm   Patient Position: Sitting   Cuff Size: Large Adult   Pulse: 63   Resp: 18   Temp: 96.9 °F (36.1 °C)   TempSrc: Temporal   SpO2: 95%   Weight: 107 kg (236 lb 6.4 oz)   Height: 170.2 cm (67.01\")      Body mass index is 37.02 kg/m².  Review of Systems   Constitutional: Negative.    HENT: Negative.     Eyes: Negative.    Respiratory: Negative.     Cardiovascular: Negative.    Gastrointestinal: Negative.    Endocrine: Negative.    Genitourinary: Negative.    Musculoskeletal: Negative.    Allergic/Immunologic: Negative.    Neurological: Negative.    Hematological: Negative.    Psychiatric/Behavioral: Negative.        Physical Exam  Constitutional:       General: He is not in acute distress.     Appearance: Normal appearance. He is obese.   HENT:      Head: Normocephalic.      Mouth/Throat:      Mouth: Mucous membranes are moist.   Eyes:      Conjunctiva/sclera: Conjunctivae normal.      Pupils: Pupils are equal, round, and reactive to light.   Cardiovascular:      Rate and Rhythm: Normal rate and regular rhythm.      Pulses: Normal pulses.      Heart sounds: Normal heart sounds.   Pulmonary:      Effort: Pulmonary effort is normal.      Breath sounds: Normal breath sounds.   Abdominal:      General: Bowel sounds are normal.      Palpations: Abdomen is soft.   Musculoskeletal:         General: No swelling. Normal range of motion.      Cervical back: Neck supple.   Skin:     General: Skin is warm and dry.      Coloration: Skin is not jaundiced.   Neurological:      General: No focal deficit present.      Mental Status: He is alert and oriented to person, place, and time. Mental status is at baseline.   Psychiatric:         Mood and Affect: Mood normal.         " "Behavior: Behavior normal.         Thought Content: Thought content normal.         Judgment: Judgment normal.        Result Review :   No results found for: \"PROBNP\", \"BNP\"  CMP          6/8/2023    09:34 8/9/2023    07:27 12/8/2023    07:39   CMP   Glucose 101   104    BUN 15   15    Creatinine 1.13   1.31    EGFR 73.5   61.2    Sodium 141   142    Potassium 4.8   4.7    Chloride 106   108    Calcium 9.8   9.5    Total Protein 7.3  6.5  6.8    Albumin 4.1  3.6  4.1    Globulin 3.2   2.7    Total Bilirubin 0.7  0.4  0.5    Alkaline Phosphatase 73  65  66    AST (SGOT) 20  21  24    ALT (SGPT) 24  39  28    Albumin/Globulin Ratio 1.3   1.5    BUN/Creatinine Ratio 13.3   11.5    Anion Gap 10.0   9.5      CBC w/diff          7/28/2023    09:52 8/9/2023    07:27 12/8/2023    07:39   CBC w/Diff   WBC   8.15    RBC   5.28    Hemoglobin 16.3  15.4  17.4    Hematocrit 47.8  45.2  50.0    MCV   94.7    MCH   33.0    MCHC   34.8    RDW   12.6    Platelets   187    Neutrophil Rel %   64.9    Immature Granulocyte Rel %   0.5    Lymphocyte Rel %   20.9    Monocyte Rel %   8.6    Eosinophil Rel %   4.2    Basophil Rel %   0.9       Lipid Panel          6/8/2023    09:34 8/9/2023    07:27 12/8/2023    07:39   Lipid Panel   Total Cholesterol 150  113  119    Triglycerides 107  116  65    HDL Cholesterol 32  27  33    VLDL Cholesterol 20  21  14    LDL Cholesterol  98  65  72    LDL/HDL Ratio 3.02  2.33  2.21       Lab Results   Component Value Date    TSH 1.750 12/07/2021    TSH 2.780 09/13/2021    TSH 2.070 08/02/2019      Lab Results   Component Value Date    FREET4 1.09 12/07/2021    FREET4 0.9 05/04/2019      A1C Last 3 Results          6/8/2023    09:34 12/8/2023    07:39   HGBA1C Last 3 Results   Hemoglobin A1C 5.90  5.70       PSA          12/8/2023    07:39   PSA   PSA 0.862                     Visit Diagnoses:    ICD-10-CM ICD-9-CM   1. Hypersomnia  G47.10 780.54   2. Monoclonal gammopathy of undetermined significance  " D47.2 273.1   3. Stage 3a chronic kidney disease  N18.31 585.3   4. Paroxysmal atrial fibrillation  I48.0 427.31   5. Carotid atherosclerosis, bilateral  I65.23 433.10     433.30   6. Elevated liver enzymes  R74.8 790.5   7. Polycythemia  D75.1 238.4   8. Mixed hyperlipidemia  E78.2 272.2   9. Hypertension, essential  I10 401.9   10. Stenosis of carotid artery, unspecified laterality  I65.29 433.10   11. IFG (impaired fasting glucose)  R73.01 790.21   12. Obesity (BMI 30-39.9)  E66.9 278.00   13. Screening PSA (prostate specific antigen)  Z12.5 V76.44   14. Gastroesophageal reflux disease without esophagitis  K21.9 530.81   15. Physical exam, annual  Z00.00 V70.0       Assessment and Plan   Diagnoses and all orders for this visit:    1. Hypersomnia (Primary)  -     CBC & Differential; Future  -     Comprehensive Metabolic Panel; Future  -     Lipid Panel; Future  -     Hemoglobin A1c; Future  -     Ambulatory Referral to [facility] For Infusion Treatment    2. Monoclonal gammopathy of undetermined significance  -     CBC & Differential; Future  -     Comprehensive Metabolic Panel; Future  -     Lipid Panel; Future  -     Hemoglobin A1c; Future  -     Ambulatory Referral to [facility] For Infusion Treatment    3. Stage 3a chronic kidney disease  -     CBC & Differential; Future  -     Comprehensive Metabolic Panel; Future  -     Lipid Panel; Future  -     Hemoglobin A1c; Future  -     Ambulatory Referral to [facility] For Infusion Treatment    4. Paroxysmal atrial fibrillation  -     CBC & Differential; Future  -     Comprehensive Metabolic Panel; Future  -     Lipid Panel; Future  -     Hemoglobin A1c; Future  -     Ambulatory Referral to [facility] For Infusion Treatment    5. Carotid atherosclerosis, bilateral  -     CBC & Differential; Future  -     Comprehensive Metabolic Panel; Future  -     Lipid Panel; Future  -     Hemoglobin A1c; Future  -     Ambulatory Referral to [facility] For Infusion Treatment    6. Elevated liver enzymes  -     CBC &  Differential; Future  -     Comprehensive Metabolic Panel; Future  -     Lipid Panel; Future  -     Hemoglobin A1c; Future  -     Ambulatory Referral to [facility] For Infusion Treatment    7. Polycythemia  -     CBC & Differential; Future  -     Comprehensive Metabolic Panel; Future  -     Lipid Panel; Future  -     Hemoglobin A1c; Future  -     Ambulatory Referral to [facility] For Infusion Treatment    8. Mixed hyperlipidemia  -     CBC & Differential; Future  -     Comprehensive Metabolic Panel; Future  -     Lipid Panel; Future  -     Hemoglobin A1c; Future  -     Ambulatory Referral to [facility] For Infusion Treatment    9. Hypertension, essential  -     CBC & Differential; Future  -     Comprehensive Metabolic Panel; Future  -     Lipid Panel; Future  -     Hemoglobin A1c; Future  -     Ambulatory Referral to [facility] For Infusion Treatment    10. Stenosis of carotid artery, unspecified laterality  -     CBC & Differential; Future  -     Comprehensive Metabolic Panel; Future  -     Lipid Panel; Future  -     Hemoglobin A1c; Future  -     Ambulatory Referral to [facility] For Infusion Treatment    11. IFG (impaired fasting glucose)  -     CBC & Differential; Future  -     Comprehensive Metabolic Panel; Future  -     Lipid Panel; Future  -     Hemoglobin A1c; Future  -     Ambulatory Referral to [facility] For Infusion Treatment    12. Obesity (BMI 30-39.9)  -     CBC & Differential; Future  -     Comprehensive Metabolic Panel; Future  -     Lipid Panel; Future  -     Hemoglobin A1c; Future  -     Ambulatory Referral to [facility] For Infusion Treatment    13. Screening PSA (prostate specific antigen)  -     CBC & Differential; Future  -     Comprehensive Metabolic Panel; Future  -     Lipid Panel; Future  -     Hemoglobin A1c; Future  -     Ambulatory Referral to [facility] For Infusion Treatment    14. Gastroesophageal reflux disease without esophagitis  -     CBC & Differential; Future  -     Comprehensive Metabolic Panel; Future  -     Lipid Panel; Future  -      Hemoglobin A1c; Future  -     Ambulatory Referral to [facility] For Infusion Treatment    15. Physical exam, annual  -     CBC & Differential; Future  -     Comprehensive Metabolic Panel; Future  -     Lipid Panel; Future  -     Hemoglobin A1c; Future  -     Ambulatory Referral to [facility] For Infusion Treatment    Other orders  -     pantoprazole (PROTONIX) 40 MG EC tablet; Take 1 tablet by mouth Daily.  Dispense: 90 tablet; Refill: 3        Stress test April 7, 2023 shows normal exercise capacity low risk study no evidence of ischemia, Dr. Lr    Moderately advanced arthritis of the hips especially the right, and moderate arthritis in the right knee,, x-rays, June 2023 following up with Ortho----     CKD stage IIIa,, stable at 1.3, December 2023    Osteomyelitis, right ankle, has finished up course of antibiotics treatment options including continue doxycycline for 6 months versus following sed rates retreating if sed rates increase, discussed with patient December 9, 2021 --------------------patient had surgery for I&D and bone biopsy sept 1, 2021--- showing MRSA and gout into the right ankle and foot on September 1, 2021 by Dr. Turner at [facility] --- through [facility], had a PICC line placed, was put on IV antibiotics by infectious disease doctor  on vancomycin every 12 hours at home, for total of 6 weeks----released from orthopedics care as of January 2022,, sed rate CRPs both levels are undetectable or essentially 0 as of March 2022,     Impaired fasting glucose, hemoglobin A1c 5.7 December 2023,    LAP CHOLEY, AND POST OP BILE LEAK/ CBD OBSTRUCTION, READMITTED,AND HAD ERCP WITH DR PICKARD--- DEVELOPED AFIB WITH RVR, SEPTIC , ELEVATED WBC, APRIL 2019, CARDIOVERTED, WAS RX WITH ABX, HAD MARIAN DRAIN PLACED BY RADIOLOGY-- AND THEN ANOTHER MARIAN DRAIN PLACED, --THORACENTISIS ALSO DONE TIMES 2, --HAD STENT REMOVED JUNE 2019, HAD STRESS TEST WITH [facility],, May 2019, no evidence of ischemia and normal myocardial SPECT  perfusion study,-------CARDIAC EVENT MONITOR 8/2019, --NO AFIB - Stopped eliquis October 2019    Gout, cont allopurinol 300 mg qd, uric acid level 4.9 December 2022,    VASC CALCIFICATIONS SEEN ON CAROTIDS ---ON PLAIN XRAY PER [facility] FOR BONE SURVERY ----- CAROTID U/S, MARCH 2021,, no stenosis present carotid bifurcations, mild to moderate mixed density nonstenotic plaque bilateral, vertebral flow antegrade,, continue statin therapy if possible,    POLYCYTHEMIA, -hemoglobin 17.7 hematocrit 52, June 8, 2023,, consider phlebotomy, discussed with patient June 14, 2023 --- previously had issue with being presyncope vasovagal when he was going to get it phlebotomized, will set up a therapy plan, for phlebotomy, June 13, 2023 and again dec 2023     Because of polycythemia, organ to do a sleep study at the request of the [place] physician, June 2023,    PSA 1.5 December 8, 2022,   0.862 dec 2023     HTN--cont ATACAND 24 MG QD, METOPROLOL 50 QOD AND 75 QOD     ELEVATED CHOL.--continues Qunol, Crestor, 15 mg daily, Zetia 10 mg daily    ELEVATED LFTS, KEENAN--going back to 2016--currently normal June 8, 2023,------- ultrasounds liver reviewed, October 2017 and March 2017, shows improvement in liver size,---Has had ultrasounds and fibrosis scan October 2019, slightly elevated score of 0.39 stage F1 to F2, moderate steatosis, has been followed by GI service, -- ultrasound March 5, 2021 shows normal echogenicity of the liver texture, no liver lesions, no biliary ductal dilation, otherwise unremarkable--ultrasound of the liver September 1, 2022 shows liver size at 16.1 cm otherwise unremarkable scan,    MONOCLONAL PARAPROTEINURIA, DX IN 2015-16, NOW GOING TO [facility] FOR F/U Total proteins have been stable over the last several years through [place] labs, no treatment at this time    OA, OF L HIP AND NICOLAS 12/13, KAREN,, patient's MRI December 8, 2023 shows advanced arthritis in the right hip with some marrow edema, he is also  having the lumbar spine due to back pain and radiculopathy symptoms down in the thigh area, December 2023    OVERWGT-continue weight loss efforts, start walking more, exercise if he can,, discussed December 2023    LVH ON EKG AND ECHO 2016    CSCOPE 9/13 AND EGD , AND AUG 2017, NEERAJ colonoscopy August 19, 2022 tubular adenoma,                Follow Up   Return in about 6 months (around 6/13/2024).  Patient was given instructions and counseling regarding his condition or for health maintenance advice. Please see specific information pulled into the AVS if appropriate.

## 2023-12-14 ENCOUNTER — OFFICE VISIT (OUTPATIENT)
Dept: ORTHOPEDIC SURGERY | Facility: CLINIC | Age: 63
End: 2023-12-14
Payer: COMMERCIAL

## 2023-12-14 ENCOUNTER — PREP FOR SURGERY (OUTPATIENT)
Dept: OTHER | Facility: HOSPITAL | Age: 63
End: 2023-12-14
Payer: COMMERCIAL

## 2023-12-14 VITALS
WEIGHT: 236 LBS | OXYGEN SATURATION: 97 % | BODY MASS INDEX: 37.04 KG/M2 | HEART RATE: 66 BPM | HEIGHT: 67 IN | SYSTOLIC BLOOD PRESSURE: 142 MMHG | DIASTOLIC BLOOD PRESSURE: 90 MMHG

## 2023-12-14 DIAGNOSIS — M25.551 RIGHT HIP PAIN: Primary | ICD-10-CM

## 2023-12-14 DIAGNOSIS — M16.9 OA (OSTEOARTHRITIS) OF HIP: Primary | ICD-10-CM

## 2023-12-14 DIAGNOSIS — M16.11 OSTEOARTHRITIS OF RIGHT HIP, UNSPECIFIED OSTEOARTHRITIS TYPE: ICD-10-CM

## 2023-12-14 RX ORDER — CEFAZOLIN SODIUM 2 G/100ML
2 INJECTION, SOLUTION INTRAVENOUS ONCE
OUTPATIENT
Start: 2023-12-14 | End: 2023-12-14

## 2023-12-14 RX ORDER — TRANEXAMIC ACID 10 MG/ML
1000 INJECTION, SOLUTION INTRAVENOUS ONCE
OUTPATIENT
Start: 2023-12-14 | End: 2023-12-14

## 2023-12-14 RX ORDER — CEFAZOLIN SODIUM IN 0.9 % NACL 3 G/100 ML
3 INTRAVENOUS SOLUTION, PIGGYBACK (ML) INTRAVENOUS ONCE
OUTPATIENT
Start: 2023-12-14 | End: 2023-12-14

## 2023-12-14 NOTE — PROGRESS NOTES
"Chief Complaint  Follow-up of the Right Hip     Subjective      Paolo Goldstein presents to [facility] for follow up of the right hip.  He had a MRI on 12/8/23 and is here to review. He has pain on the lateral side of the hip, in the groin and in his hip and knee.  His pain has worsened since about May.  He has had no recent injury or falls.  He has had physical therapy and injections in the past. He has difficulty with prolonged standing, ambulation and stairs.     No Known Allergies     Social History     Socioeconomic History    Marital status:    Tobacco Use    Smoking status: Never    Smokeless tobacco: Never   Vaping Use    Vaping Use: Never used   Substance and Sexual Activity    Alcohol use: Yes     Comment: occasionally    Drug use: Never    Sexual activity: Yes     Partners: Female     Birth control/protection: None        I reviewed the patient's chief complaint, history of present illness, review of systems, past medical history, surgical history, family history, social history, medications, and allergy list.     Review of Systems     Constitutional: Denies fevers, chills, weight loss  Cardiovascular: Denies chest pain, shortness of breath  Skin: Denies rashes, acute skin changes  Neurologic: Denies headache, loss of consciousness        Vital Signs:   /90   Pulse 66   Ht 170.2 cm (67\")   Wt 107 kg (236 lb)   SpO2 97%   BMI 36.96 kg/m²          Physical Exam  General: Alert. No acute distress    Ortho Exam        RIGHT HIP No skin discoloration, atrophy or swelling. Positive EHL, FHL, GS, and TA. Sensation intact to all 5 nerves of the foot. Negative straight leg raise. Leg lengths appear equal. Ambulates with Antalgic gait Negative Yeni. Negative Dalia. Good strength to hamstrings, quadriceps, dorsiflexors, and plantar flexors. Knee Extensor Mechanism  intact     Procedures      Imaging Results (Most Recent)       None             Result Review : "       X-Ray Report:  Right hip X-Ray  Indication: Evaluation of the right hip  AP/Lateral view(s)  Findings: Advanced degenerative bone on bone arthritis.   Prior studies available for comparison: yes       PROCEDURE: MRI HIP WO CONTRAST [id number]  REASON FOR EXAM: M25.551 PAIN IN RIGHT HIP right hip pain for several months. Physical therapy. Left hip replacement  in 2013.  COMPARISON: CT and pelvis 05/29/2020  TECHNIQUE: Multiplanar multi-echo imaging performed right hip and pelvis.  FINDINGS: Advanced right hip degenerative arthropathy with progression when compared to CT scan of May 2020. Extensive  full-thickness cartilage loss along the weight-bearing aspect of the right hip joint. Extensive subchondral edema weight-bearing  acetabulum and along the weight-bearing aspect of the right femoral head. Large marginal osteophytes femoral head. Extensive  degeneration of the acetabular labrum 0.5 x 1.4 cm ossified fragment along the lateral margin of the right acetabular margin. Small  hip effusion.  Left total hip arthroplasty with associated susceptibility artifact. No visible complication. Pelvic musculature appears normal. All  tendinous attachments about the hips and pelvis. Internal pelvic structures unremarkable. SI joints unremarkable. Degenerative disc  disease and facet arthropathy lower lumbar spine. Severe lower lumbar spine spinal stenosis likely at the L4-L5 level due to a  combination of disc disease and facet arthropathy.  IMPRESSION: Advanced degenerative arthropathy of the right hip with bone marrow edema and joint effusion compatible with  active inflammatory process. This has shown progression from May 2020.  Lower lumbar spine degenerative disc disease and facet arthropathy with severe spinal stenosis at the expected L4-L5 level.        Assessment and Plan     Diagnoses and all orders for this visit:    1. Right hip pain (Primary)    2. Osteoarthritis of right hip, unspecified osteoarthritis  type        Discussed the treatment plan with the patient. I reviewed the X-rays that were obtained today with the patient. I reviewed the MRI results with the patient.     Discussed the treatment options with the patient, operative vs non-operative.     The patient expressed understanding and wished to proceed with a right total hip arthroplasty.       Discussed surgery., Risks/benefits discussed with patient including, but not limited to: infection, bleeding, neurovascular damage, re-rupture, aesthetic deformity, need for further surgery, and death., Discussed with patient the implant type being used during surgery and patient understands., Surgery pamphlet given., Call or return if worsening symptoms., and DME order for a 3 in 1 given today due to patient will be confined to one room/level of the home that does not offer a toilet during postop recovery.     Follow Up     2 weeks postoperatively       Patient was given instructions and counseling regarding his condition or for health maintenance advice. Please see specific information pulled into the AVS if appropriate.     Scribed for Cassius Reilly MD by Carmel Pate MA.  12/14/23   14:45 EST    I have personally performed the services described in this document as scribed by the above individual and it is both accurate and complete. Cassius Reilly MD 12/14/23

## 2024-01-19 ENCOUNTER — TELEPHONE (OUTPATIENT)
Dept: ORTHOPEDIC SURGERY | Facility: CLINIC | Age: 64
End: 2024-01-19
Payer: COMMERCIAL

## 2024-01-19 NOTE — TELEPHONE ENCOUNTER
SPOKE WITH PATIENT, SURGERY FOR 4/15/2024 CANCELLED ALONG WITH PAT APPOINTMENT FOR 4/8/2024. WHEN PATIENT DECIDES TO RESCHEDULE HE WILL GIVE THE OFFICE A CALL BACK. IF IT IS GREATER THAN 3 MONTHS FROM LAST BEING SEEN IN THE OFFICE HE WILL NEED TO SCHEDULE A FOLLOW-UP APPOINTMENT PRIOR TO RESCHEDULING SURGERY. PATIENT VOICED UNDERSTANDING.

## 2024-01-19 NOTE — TELEPHONE ENCOUNTER
Provider:  DR. REBEKAH JONES    Caller: ZARA NERI    Relationship to Patient: SELF      Phone Number: [phone]    Reason for Call: PATIENT NEEDS TO CANCEL HIP SURGERY ON 4/15/24. PATIENT SAYS HE HAS BACK PROBLEMS AND IS GOING TO HAVE TO TAKE CARE OF HIS BACK FIRST. IT HURTS THE WORSE.    When was the patient last seen: 12/14/23

## 2024-01-24 ENCOUNTER — HOSPITAL ENCOUNTER (OUTPATIENT)
Dept: INFUSION THERAPY | Facility: HOSPITAL | Age: 64
Discharge: HOME | End: 2024-01-24
Admitting: INTERNAL MEDICINE
Payer: COMMERCIAL

## 2024-01-24 VITALS
DIASTOLIC BLOOD PRESSURE: 74 MMHG | TEMPERATURE: 97.9 F | OXYGEN SATURATION: 99 % | SYSTOLIC BLOOD PRESSURE: 137 MMHG | RESPIRATION RATE: 20 BRPM | HEART RATE: 66 BPM

## 2024-01-24 DIAGNOSIS — D75.1 POLYCYTHEMIA: Primary | ICD-10-CM

## 2024-01-24 LAB
HCT VFR BLD AUTO: 50.5 % (ref 37.5–51)
HGB BLD-MCNC: 17.4 G/DL (ref 13–17.7)

## 2024-01-24 PROCEDURE — 36415 COLL VENOUS BLD VENIPUNCTURE: CPT

## 2024-01-24 PROCEDURE — 85018 HEMOGLOBIN: CPT | Performed by: INTERNAL MEDICINE

## 2024-01-24 PROCEDURE — 85014 HEMATOCRIT: CPT | Performed by: INTERNAL MEDICINE

## 2024-01-24 PROCEDURE — 99195 PHLEBOTOMY: CPT

## 2024-01-24 RX ORDER — SODIUM CHLORIDE 9 MG/ML
250 INJECTION, SOLUTION INTRAVENOUS ONCE
OUTPATIENT
Start: 2024-01-28

## 2024-01-24 RX ORDER — SODIUM CHLORIDE 9 MG/ML
250 INJECTION, SOLUTION INTRAVENOUS ONCE
Status: DISCONTINUED | OUTPATIENT
Start: 2024-01-24 | End: 2024-01-27 | Stop reason: HOSPADM

## 2024-02-12 ENCOUNTER — TELEPHONE (OUTPATIENT)
Dept: CARDIOLOGY | Facility: CLINIC | Age: 64
End: 2024-02-12
Payer: COMMERCIAL

## 2024-03-01 ENCOUNTER — LAB (OUTPATIENT)
Dept: LAB | Facility: HOSPITAL | Age: 64
End: 2024-03-01
Payer: COMMERCIAL

## 2024-03-01 DIAGNOSIS — E78.2 MIXED HYPERLIPIDEMIA: ICD-10-CM

## 2024-03-01 DIAGNOSIS — D75.1 POLYCYTHEMIA: ICD-10-CM

## 2024-03-01 LAB
ALBUMIN SERPL-MCNC: 4.1 G/DL (ref 3.5–5.2)
ALP SERPL-CCNC: 66 U/L (ref 39–117)
ALT SERPL W P-5'-P-CCNC: 30 U/L (ref 1–41)
AST SERPL-CCNC: 21 U/L (ref 1–40)
BILIRUB CONJ SERPL-MCNC: <0.2 MG/DL (ref 0–0.3)
BILIRUB INDIRECT SERPL-MCNC: NORMAL MG/DL
BILIRUB SERPL-MCNC: 0.5 MG/DL (ref 0–1.2)
CHOLEST SERPL-MCNC: 110 MG/DL (ref 0–200)
HCT VFR BLD AUTO: 49.8 % (ref 37.5–51)
HDLC SERPL-MCNC: 29 MG/DL (ref 40–60)
HGB BLD-MCNC: 17 G/DL (ref 13–17.7)
LDLC SERPL CALC-MCNC: 62 MG/DL (ref 0–100)
LDLC/HDLC SERPL: 2.12 {RATIO}
PROT SERPL-MCNC: 6.9 G/DL (ref 6–8.5)
TRIGL SERPL-MCNC: 98 MG/DL (ref 0–150)
VLDLC SERPL-MCNC: 19 MG/DL (ref 5–40)

## 2024-03-01 PROCEDURE — 36415 COLL VENOUS BLD VENIPUNCTURE: CPT

## 2024-03-01 PROCEDURE — 85018 HEMOGLOBIN: CPT

## 2024-03-01 PROCEDURE — 85014 HEMATOCRIT: CPT

## 2024-03-01 PROCEDURE — 80076 HEPATIC FUNCTION PANEL: CPT

## 2024-03-01 PROCEDURE — 80061 LIPID PANEL: CPT

## 2024-03-04 DIAGNOSIS — I10 HYPERTENSION, ESSENTIAL: ICD-10-CM

## 2024-03-05 RX ORDER — METOPROLOL SUCCINATE 50 MG/1
75 TABLET, EXTENDED RELEASE ORAL NIGHTLY
Qty: 135 TABLET | Refills: 0 | Status: SHIPPED | OUTPATIENT
Start: 2024-03-05 | End: 2024-03-07 | Stop reason: SDUPTHER

## 2024-03-07 ENCOUNTER — OFFICE VISIT (OUTPATIENT)
Dept: CARDIOLOGY | Facility: CLINIC | Age: 64
End: 2024-03-07
Payer: COMMERCIAL

## 2024-03-07 VITALS
SYSTOLIC BLOOD PRESSURE: 133 MMHG | HEART RATE: 56 BPM | BODY MASS INDEX: 36.88 KG/M2 | WEIGHT: 235 LBS | HEIGHT: 67 IN | DIASTOLIC BLOOD PRESSURE: 86 MMHG

## 2024-03-07 DIAGNOSIS — I48.0 PAROXYSMAL ATRIAL FIBRILLATION: Primary | ICD-10-CM

## 2024-03-07 DIAGNOSIS — I10 HYPERTENSION, ESSENTIAL: ICD-10-CM

## 2024-03-07 PROCEDURE — 99214 OFFICE O/P EST MOD 30 MIN: CPT | Performed by: INTERNAL MEDICINE

## 2024-03-07 RX ORDER — CANDESARTAN 32 MG/1
32 TABLET ORAL DAILY
Qty: 90 TABLET | Refills: 3 | Status: SHIPPED | OUTPATIENT
Start: 2024-03-07

## 2024-03-07 RX ORDER — ROSUVASTATIN CALCIUM 10 MG/1
15 TABLET, COATED ORAL DAILY
Qty: 135 TABLET | Refills: 3 | Status: SHIPPED | OUTPATIENT
Start: 2024-03-07

## 2024-03-07 RX ORDER — METOPROLOL SUCCINATE 50 MG/1
75 TABLET, EXTENDED RELEASE ORAL NIGHTLY
Qty: 135 TABLET | Refills: 3 | Status: SHIPPED | OUTPATIENT
Start: 2024-03-07

## 2024-03-07 NOTE — ASSESSMENT & PLAN NOTE
Patient with borderline elevation his blood pressure recommend up titration of his candesartan 32 mg daily continue with Toprol 20 Dacadis

## 2024-03-07 NOTE — ASSESSMENT & PLAN NOTE
Patient maintain normal sinus rhythm no anginal symptoms still able to do 4 METS of activity his EKG does show T wave inversions in the anterolateral and inferior leads this has been seen on previous EKGs on multiple occasions the last few years he had stress testing done in April 2023 which showed no ischemia at that time any perfusion or any anginal symptoms with good exercise capacity for the patient can proceed with his planned surgical procedure without any further evaluation is at low to moderate risk is given his age and other risk factors for adverse cardiovascular events and will try to continue on his Toprol 75 daily through the perioperative period if tolerated

## 2024-03-07 NOTE — PROGRESS NOTES
Chief Complaint  Follow-up, Hypertension, and Atrial Fibrillation    Subjective    Patient has been doing well denies any symptomatic tachycardic issues no change in overall breathing ability.  Patient is scheduled undergo surgery on his hip he does report able to ambulate 2 flights stairs without any change EKG done for preoperative testing did show some T wave abnormalities in the anterior lateral and inferior leads.    Past Medical History:   Diagnosis Date    A-fib     Acid reflux     Arthritis     Carotid atherosclerosis, bilateral 12/09/2021    Colon polyps     Essential hypertension     Family history of colon cancer     Fatty liver     GERD (gastroesophageal reflux disease)     Hip pain     LEFT     Insomnia     MGUS (monoclonal gammopathy of unknown significance)     Pes cavus of left foot 01/12/2019    Primary osteoarthritis of left foot 01/12/2019    Sciatic nerve pain          Current Outpatient Medications:     allopurinol (ZYLOPRIM) 300 MG tablet, TAKE 1 TABLET BY MOUTH DAILY, Disp: 30 tablet, Rfl: 5    aspirin 81 MG EC tablet, Aspir-81 81 mg oral tablet,delayed release (DR/EC) take 1 tablet (81 mg) by oral route once daily   Active, Disp: , Rfl:     cholecalciferol (VITAMIN D3) 25 MCG (1000 UT) tablet, Take 1 tablet by mouth Daily., Disp: , Rfl:     colestipol (COLESTID) 5 g granules, Take 1 g by mouth., Disp: , Rfl:     ezetimibe (ZETIA) 10 MG tablet, Take 1 tablet by mouth Daily., Disp: 90 tablet, Rfl: 3    glucosamine-chondroitin 500-400 MG capsule capsule, Take  by mouth 3 (Three) Times a Day With Meals., Disp: , Rfl:     Loratadine 10 MG capsule, As Needed., Disp: , Rfl:     melatonin 1 MG tablet, Take  by mouth., Disp: , Rfl:     meloxicam (MOBIC) 15 MG tablet, Take 1 tablet by mouth Daily., Disp: 30 tablet, Rfl: 2    metoprolol succinate XL (TOPROL-XL) 50 MG 24 hr tablet, Take 1.5 tablets by mouth Every Night., Disp: 135 tablet, Rfl: 3    Omega-3 Fatty Acids (fish oil) 1000 MG capsule capsule,  "Take 1 capsule by mouth Daily With Breakfast., Disp: , Rfl:     pantoprazole (PROTONIX) 40 MG EC tablet, Take 1 tablet by mouth Daily., Disp: 90 tablet, Rfl: 3    RESVERATROL PO, Take  by mouth., Disp: , Rfl:     Turmeric 1053 MG tablet, turmeric 400 mg oral capsule take 1 capsule by oral route daily   Active, Disp: , Rfl:     zolpidem (Ambien) 5 MG tablet, Take 1 tablet by mouth At Night As Needed for Sleep., Disp: 2 tablet, Rfl: 0    candesartan (ATACAND) 32 MG tablet, Take 1 tablet by mouth Daily., Disp: 90 tablet, Rfl: 3    rosuvastatin (CRESTOR) 10 MG tablet, Take 1.5 tablets by mouth Daily., Disp: 135 tablet, Rfl: 3    Medications Discontinued During This Encounter   Medication Reason    candesartan (ATACAND) 16 MG tablet     rosuvastatin (CRESTOR) 10 MG tablet     metoprolol succinate XL (TOPROL-XL) 50 MG 24 hr tablet Reorder     No Known Allergies     Social History     Tobacco Use    Smoking status: Never    Smokeless tobacco: Never   Vaping Use    Vaping status: Never Used   Substance Use Topics    Alcohol use: Yes     Comment: occasionally    Drug use: Never       Family History   Problem Relation Age of Onset    Stroke Mother     Arthritis Mother     Thyroid disease Mother     Prostate cancer Father 69    Heart disease Father     Colon cancer Paternal Aunt     Colon cancer Paternal Uncle     Colon cancer Paternal Grandmother 60        Objective     /86   Pulse 56   Ht 170.2 cm (67\")   Wt 107 kg (235 lb)   BMI 36.81 kg/m²       Physical Exam    General Appearance:   no acute distress  Alert and oriented x3  HENT:   lips not cyanotic  Atraumatic  Neck:  No jvd   supple  Respiratory:  no respiratory distress  normal breath sounds  no rales  Cardiovascular:  Regular rate and rhythm  no S3, no S4   no murmur  no rub  Extremities  No cyanosis  lower extremity edema: none    Skin:   warm, dry  No rashes      Result Review :     No results found for: \"PROBNP\"  CMP          8/9/2023    07:27 12/8/2023 " "   07:39 3/1/2024    07:22   CMP   Glucose  104     BUN  15     Creatinine  1.31     EGFR  61.2     Sodium  142     Potassium  4.7     Chloride  108     Calcium  9.5     Total Protein 6.5  6.8  6.9    Albumin 3.6  4.1  4.1    Globulin  2.7     Total Bilirubin 0.4  0.5  0.5    Alkaline Phosphatase 65  66  66    AST (SGOT) 21  24  21    ALT (SGPT) 39  28  30    Albumin/Globulin Ratio  1.5     BUN/Creatinine Ratio  11.5     Anion Gap  9.5       CBC w/diff          1/24/2024    09:18 2/28/2024    09:19 3/1/2024    07:22   CBC w/Diff   WBC  7.08        RBC  5.17        Hemoglobin 17.4  16.8     17.0    Hematocrit 50.5  48.9     49.8    MCV  94.6        MCH  32.5        MCHC  34.4        RDW  12.5        Platelets  195        Neutrophil Rel %  63.0        Immature Granulocyte Rel %  0.3        Lymphocyte Rel %  21.9        Monocyte Rel %  9.2        Eosinophil Rel %  4.8        Basophil Rel %  0.8           Details          This result is from an external source.              Lab Results   Component Value Date    TSH 1.750 12/07/2021      Lab Results   Component Value Date    FREET4 1.09 12/07/2021      No results found for: \"DDIMERQUANT\"  Magnesium   Date Value Ref Range Status   06/09/2022 2.0 1.6 - 2.4 mg/dL Final      No results found for: \"DIGOXIN\"   Lab Results   Component Value Date    TROPONINT <0.01 05/05/2019           Lipid Panel          8/9/2023    07:27 12/8/2023    07:39 3/1/2024    07:22   Lipid Panel   Total Cholesterol 113  119  110    Triglycerides 116  65  98    HDL Cholesterol 27  33  29    VLDL Cholesterol 21  14  19    LDL Cholesterol  65  72  62    LDL/HDL Ratio 2.33  2.21  2.12      No results found for: \"POCTROP\"                   Diagnoses and all orders for this visit:    1. Paroxysmal atrial fibrillation (Primary)  Assessment & Plan:  Patient maintain normal sinus rhythm no anginal symptoms still able to do 4 METS of activity his EKG does show T wave inversions in the anterolateral and " inferior leads this has been seen on previous EKGs on multiple occasions the last few years he had stress testing done in April 2023 which showed no ischemia at that time any perfusion or any anginal symptoms with good exercise capacity for the patient can proceed with his planned surgical procedure without any further evaluation is at low to moderate risk is given his age and other risk factors for adverse cardiovascular events and will try to continue on his Toprol 75 daily through the perioperative period if tolerated      2. Hypertension, essential  Assessment & Plan:  Patient with borderline elevation his blood pressure recommend up titration of his candesartan 32 mg daily continue with Toprol 20 Dacadis     Orders:  -     metoprolol succinate XL (TOPROL-XL) 50 MG 24 hr tablet; Take 1.5 tablets by mouth Every Night.  Dispense: 135 tablet; Refill: 3    Other orders  -     candesartan (ATACAND) 32 MG tablet; Take 1 tablet by mouth Daily.  Dispense: 90 tablet; Refill: 3  -     rosuvastatin (CRESTOR) 10 MG tablet; Take 1.5 tablets by mouth Daily.  Dispense: 135 tablet; Refill: 3            Follow Up     Return in about 6 months (around 9/7/2024) for Follow with Carmen Crawford.          Patient was given instructions and counseling regarding his condition or for health maintenance advice. Please see specific information pulled into the AVS if appropriate.

## 2024-04-29 DIAGNOSIS — M17.11 OSTEOARTHRITIS OF RIGHT KNEE, UNSPECIFIED OSTEOARTHRITIS TYPE: ICD-10-CM

## 2024-04-29 DIAGNOSIS — M16.11 OSTEOARTHRITIS OF RIGHT HIP, UNSPECIFIED OSTEOARTHRITIS TYPE: ICD-10-CM

## 2024-04-29 DIAGNOSIS — M25.551 RIGHT HIP PAIN: ICD-10-CM

## 2024-04-29 DIAGNOSIS — M25.561 RIGHT KNEE PAIN, UNSPECIFIED CHRONICITY: ICD-10-CM

## 2024-04-29 RX ORDER — MELOXICAM 15 MG/1
15 TABLET ORAL DAILY
Qty: 30 TABLET | Refills: 2 | Status: SHIPPED | OUTPATIENT
Start: 2024-04-29

## 2024-06-05 RX ORDER — ALLOPURINOL 300 MG/1
300 TABLET ORAL DAILY
Qty: 30 TABLET | Refills: 5 | Status: SHIPPED | OUTPATIENT
Start: 2024-06-05

## 2024-06-10 ENCOUNTER — LAB (OUTPATIENT)
Dept: LAB | Facility: HOSPITAL | Age: 64
End: 2024-06-10
Payer: COMMERCIAL

## 2024-06-10 DIAGNOSIS — D75.1 POLYCYTHEMIA: ICD-10-CM

## 2024-06-10 DIAGNOSIS — Z12.5 SCREENING PSA (PROSTATE SPECIFIC ANTIGEN): ICD-10-CM

## 2024-06-10 DIAGNOSIS — R74.8 ELEVATED LIVER ENZYMES: ICD-10-CM

## 2024-06-10 DIAGNOSIS — I10 HYPERTENSION, ESSENTIAL: ICD-10-CM

## 2024-06-10 DIAGNOSIS — K21.9 GASTROESOPHAGEAL REFLUX DISEASE WITHOUT ESOPHAGITIS: ICD-10-CM

## 2024-06-10 DIAGNOSIS — I65.29 STENOSIS OF CAROTID ARTERY, UNSPECIFIED LATERALITY: ICD-10-CM

## 2024-06-10 DIAGNOSIS — G47.10 HYPERSOMNIA: ICD-10-CM

## 2024-06-10 DIAGNOSIS — N18.31 STAGE 3A CHRONIC KIDNEY DISEASE: ICD-10-CM

## 2024-06-10 DIAGNOSIS — Z00.00 PHYSICAL EXAM, ANNUAL: ICD-10-CM

## 2024-06-10 DIAGNOSIS — E66.9 OBESITY (BMI 30-39.9): ICD-10-CM

## 2024-06-10 DIAGNOSIS — R73.01 IFG (IMPAIRED FASTING GLUCOSE): ICD-10-CM

## 2024-06-10 DIAGNOSIS — I65.23 CAROTID ATHEROSCLEROSIS, BILATERAL: ICD-10-CM

## 2024-06-10 DIAGNOSIS — I48.0 PAROXYSMAL ATRIAL FIBRILLATION: ICD-10-CM

## 2024-06-10 DIAGNOSIS — D47.2 MONOCLONAL GAMMOPATHY OF UNDETERMINED SIGNIFICANCE: ICD-10-CM

## 2024-06-10 DIAGNOSIS — E78.2 MIXED HYPERLIPIDEMIA: ICD-10-CM

## 2024-06-10 LAB
ALBUMIN SERPL-MCNC: 3.9 G/DL (ref 3.5–5.2)
ALBUMIN/GLOB SERPL: 1.3 G/DL
ALP SERPL-CCNC: 95 U/L (ref 39–117)
ALT SERPL W P-5'-P-CCNC: 20 U/L (ref 1–41)
ANION GAP SERPL CALCULATED.3IONS-SCNC: 9.4 MMOL/L (ref 5–15)
AST SERPL-CCNC: 20 U/L (ref 1–40)
BASOPHILS # BLD AUTO: 0.03 10*3/MM3 (ref 0–0.2)
BASOPHILS NFR BLD AUTO: 0.5 % (ref 0–1.5)
BILIRUB SERPL-MCNC: 0.3 MG/DL (ref 0–1.2)
BUN SERPL-MCNC: 14 MG/DL (ref 8–23)
BUN/CREAT SERPL: 12.4 (ref 7–25)
CALCIUM SPEC-SCNC: 9.2 MG/DL (ref 8.6–10.5)
CHLORIDE SERPL-SCNC: 108 MMOL/L (ref 98–107)
CHOLEST SERPL-MCNC: 106 MG/DL (ref 0–200)
CO2 SERPL-SCNC: 21.6 MMOL/L (ref 22–29)
CREAT SERPL-MCNC: 1.13 MG/DL (ref 0.76–1.27)
DEPRECATED RDW RBC AUTO: 43.7 FL (ref 37–54)
EGFRCR SERPLBLD CKD-EPI 2021: 73 ML/MIN/1.73
EOSINOPHIL # BLD AUTO: 0.52 10*3/MM3 (ref 0–0.4)
EOSINOPHIL NFR BLD AUTO: 8.2 % (ref 0.3–6.2)
ERYTHROCYTE [DISTWIDTH] IN BLOOD BY AUTOMATED COUNT: 12.6 % (ref 12.3–15.4)
GLOBULIN UR ELPH-MCNC: 3 GM/DL
GLUCOSE SERPL-MCNC: 101 MG/DL (ref 65–99)
HBA1C MFR BLD: 5.6 % (ref 4.8–5.6)
HCT VFR BLD AUTO: 46.8 % (ref 37.5–51)
HDLC SERPL-MCNC: 33 MG/DL (ref 40–60)
HGB BLD-MCNC: 15.9 G/DL (ref 13–17.7)
IMM GRANULOCYTES # BLD AUTO: 0.03 10*3/MM3 (ref 0–0.05)
IMM GRANULOCYTES NFR BLD AUTO: 0.5 % (ref 0–0.5)
LDLC SERPL CALC-MCNC: 55 MG/DL (ref 0–100)
LDLC/HDLC SERPL: 1.67 {RATIO}
LYMPHOCYTES # BLD AUTO: 1.82 10*3/MM3 (ref 0.7–3.1)
LYMPHOCYTES NFR BLD AUTO: 28.8 % (ref 19.6–45.3)
MCH RBC QN AUTO: 32.3 PG (ref 26.6–33)
MCHC RBC AUTO-ENTMCNC: 34 G/DL (ref 31.5–35.7)
MCV RBC AUTO: 94.9 FL (ref 79–97)
MONOCYTES # BLD AUTO: 0.45 10*3/MM3 (ref 0.1–0.9)
MONOCYTES NFR BLD AUTO: 7.1 % (ref 5–12)
NEUTROPHILS NFR BLD AUTO: 3.46 10*3/MM3 (ref 1.7–7)
NEUTROPHILS NFR BLD AUTO: 54.9 % (ref 42.7–76)
NRBC BLD AUTO-RTO: 0 /100 WBC (ref 0–0.2)
PLATELET # BLD AUTO: 180 10*3/MM3 (ref 140–450)
PMV BLD AUTO: 11 FL (ref 6–12)
POTASSIUM SERPL-SCNC: 4.5 MMOL/L (ref 3.5–5.2)
PROT SERPL-MCNC: 6.9 G/DL (ref 6–8.5)
RBC # BLD AUTO: 4.93 10*6/MM3 (ref 4.14–5.8)
SODIUM SERPL-SCNC: 139 MMOL/L (ref 136–145)
TRIGL SERPL-MCNC: 89 MG/DL (ref 0–150)
VLDLC SERPL-MCNC: 18 MG/DL (ref 5–40)
WBC NRBC COR # BLD AUTO: 6.31 10*3/MM3 (ref 3.4–10.8)

## 2024-06-10 PROCEDURE — 83036 HEMOGLOBIN GLYCOSYLATED A1C: CPT

## 2024-06-10 PROCEDURE — 85025 COMPLETE CBC W/AUTO DIFF WBC: CPT

## 2024-06-10 PROCEDURE — 80053 COMPREHEN METABOLIC PANEL: CPT

## 2024-06-10 PROCEDURE — 36415 COLL VENOUS BLD VENIPUNCTURE: CPT

## 2024-06-10 PROCEDURE — 80061 LIPID PANEL: CPT

## 2024-06-11 ENCOUNTER — TELEPHONE (OUTPATIENT)
Dept: CARDIOLOGY | Facility: CLINIC | Age: 64
End: 2024-06-11
Payer: COMMERCIAL

## 2024-06-11 NOTE — TELEPHONE ENCOUNTER
----- Message from Carmen Crawford sent at 6/11/2024  2:04 PM EDT -----  Blood counts, lipid panel, renal function, and electrolytes are stable  Continue current meds

## 2024-06-13 ENCOUNTER — OFFICE VISIT (OUTPATIENT)
Dept: INTERNAL MEDICINE | Age: 64
End: 2024-06-13
Payer: COMMERCIAL

## 2024-06-13 VITALS
SYSTOLIC BLOOD PRESSURE: 116 MMHG | OXYGEN SATURATION: 95 % | BODY MASS INDEX: 38.3 KG/M2 | DIASTOLIC BLOOD PRESSURE: 77 MMHG | HEIGHT: 67 IN | HEART RATE: 75 BPM | WEIGHT: 244 LBS | TEMPERATURE: 98.9 F

## 2024-06-13 DIAGNOSIS — I10 HYPERTENSION, ESSENTIAL: ICD-10-CM

## 2024-06-13 DIAGNOSIS — Z12.5 SCREENING PSA (PROSTATE SPECIFIC ANTIGEN): ICD-10-CM

## 2024-06-13 DIAGNOSIS — R74.8 ELEVATED LIVER ENZYMES: ICD-10-CM

## 2024-06-13 DIAGNOSIS — G47.33 OSA (OBSTRUCTIVE SLEEP APNEA): ICD-10-CM

## 2024-06-13 DIAGNOSIS — D75.1 POLYCYTHEMIA: Primary | ICD-10-CM

## 2024-06-13 DIAGNOSIS — K21.9 GASTROESOPHAGEAL REFLUX DISEASE WITHOUT ESOPHAGITIS: ICD-10-CM

## 2024-06-13 DIAGNOSIS — D47.2 MONOCLONAL GAMMOPATHY OF UNDETERMINED SIGNIFICANCE: ICD-10-CM

## 2024-06-13 DIAGNOSIS — N18.31 STAGE 3A CHRONIC KIDNEY DISEASE: ICD-10-CM

## 2024-06-13 DIAGNOSIS — E78.2 MIXED HYPERLIPIDEMIA: ICD-10-CM

## 2024-06-13 DIAGNOSIS — I48.0 PAROXYSMAL ATRIAL FIBRILLATION: ICD-10-CM

## 2024-06-13 DIAGNOSIS — R73.01 IFG (IMPAIRED FASTING GLUCOSE): ICD-10-CM

## 2024-06-13 PROCEDURE — 99214 OFFICE O/P EST MOD 30 MIN: CPT | Performed by: INTERNAL MEDICINE

## 2024-06-13 RX ORDER — UBIDECARENONE 100 MG
100 CAPSULE ORAL DAILY
COMMUNITY

## 2024-06-13 RX ORDER — PANTOPRAZOLE SODIUM 40 MG/1
40 TABLET, DELAYED RELEASE ORAL DAILY
Qty: 90 TABLET | Refills: 3 | Status: SHIPPED | OUTPATIENT
Start: 2024-06-13

## 2024-06-13 NOTE — PROGRESS NOTES
"CHIEF COMPLAINT/ HPI: Patient is here to follow-up with his 6-month checkup and to review labs, here to follow-up with polycythemia hypertension, sleep issues, says he has been doing okay  Hypertension (Routine follow up. Lab follow up , Med refill. Rt testicle is larger. )    Status post right total hip arthroplasty Dr. Hallman in [facility] April 10, 2024, doing well, just now getting back to playing golf,          Objective   Vital Signs  Vitals:    06/13/24 1014   BP: 116/77   Pulse: 75   Temp: 98.9 °F (37.2 °C)   SpO2: 95%   Weight: 111 kg (244 lb)   Height: 170.2 cm (67.01\")      Body mass index is 38.21 kg/m².  Review of Systems   Constitutional: Negative.    HENT: Negative.     Eyes: Negative.    Respiratory: Negative.     Cardiovascular: Negative.    Gastrointestinal: Negative.    Endocrine: Negative.    Genitourinary: Negative.    Musculoskeletal: Negative.    Allergic/Immunologic: Negative.    Neurological: Negative.    Hematological: Negative.    Psychiatric/Behavioral: Negative.        Physical Exam  Constitutional:       General: He is not in acute distress.     Appearance: Normal appearance.   HENT:      Head: Normocephalic.      Mouth/Throat:      Mouth: Mucous membranes are moist.   Eyes:      Conjunctiva/sclera: Conjunctivae normal.      Pupils: Pupils are equal, round, and reactive to light.   Cardiovascular:      Rate and Rhythm: Normal rate and regular rhythm.      Pulses: Normal pulses.      Heart sounds: Normal heart sounds.   Pulmonary:      Effort: Pulmonary effort is normal.      Breath sounds: Normal breath sounds.   Abdominal:      General: Abdomen is flat. Bowel sounds are normal.      Palpations: Abdomen is soft.   Musculoskeletal:         General: No swelling. Normal range of motion.      Cervical back: Neck supple.   Skin:     General: Skin is warm and dry.      Coloration: Skin is not jaundiced.   Neurological:      General: No focal deficit present.      Mental Status: He is " "alert and oriented to person, place, and time. Mental status is at baseline.   Psychiatric:         Mood and Affect: Mood normal.         Behavior: Behavior normal.         Thought Content: Thought content normal.         Judgment: Judgment normal.     Patient has just a little bit of fullness of the spermatic cord blood vessels on the top of the right testicle, no testicular masses felt testicle size is about the same symmetric,  Result Review :   No results found for: \"PROBNP\", \"BNP\"  CMP          3/1/2024    07:22 3/20/2024    12:44 6/10/2024    09:01   CMP   Glucose   101    BUN   14    Creatinine   1.13    EGFR   73.0    Sodium   139    Potassium   4.5    Chloride   108    Calcium   9.2    Total Protein 6.9  6.5     6.9    Albumin 4.1   3.9    Globulin   3.0    Total Bilirubin 0.5   0.3    Alkaline Phosphatase 66   95    AST (SGOT) 21   20    ALT (SGPT) 30   20    Albumin/Globulin Ratio   1.3    BUN/Creatinine Ratio   12.4    Anion Gap   9.4       Details          This result is from an external source.             CBC w/diff          2/28/2024    09:19 3/1/2024    07:22 6/10/2024    09:01   CBC w/Diff   WBC 7.08      6.31    RBC 5.17      4.93    Hemoglobin 16.8     17.0  15.9    Hematocrit 48.9     49.8  46.8    MCV 94.6      94.9    MCH 32.5      32.3    MCHC 34.4      34.0    RDW 12.5      12.6    Platelets 195      180    Neutrophil Rel % 63.0      54.9    Immature Granulocyte Rel % 0.3      0.5    Lymphocyte Rel % 21.9      28.8    Monocyte Rel % 9.2      7.1    Eosinophil Rel % 4.8      8.2    Basophil Rel % 0.8      0.5       Details          This result is from an external source.              Lipid Panel          12/8/2023    07:39 3/1/2024    07:22 6/10/2024    09:01   Lipid Panel   Total Cholesterol 119  110  106    Triglycerides 65  98  89    HDL Cholesterol 33  29  33    VLDL Cholesterol 14  19  18    LDL Cholesterol  72  62  55    LDL/HDL Ratio 2.21  2.12  1.67       Lab Results   Component Value " Date    TSH 1.750 12/07/2021    TSH 2.780 09/13/2021    TSH 2.070 08/02/2019      Lab Results   Component Value Date    FREET4 1.09 12/07/2021    FREET4 0.9 05/04/2019      A1C Last 3 Results          12/8/2023    07:39 6/10/2024    09:01   HGBA1C Last 3 Results   Hemoglobin A1C 5.70  5.60       PSA          12/8/2023    07:39   PSA   PSA 0.862                     Visit Diagnoses:    ICD-10-CM ICD-9-CM   1. Polycythemia  D75.1 238.4   2. JAMEE (obstructive sleep apnea)  G47.33 327.23   3. Monoclonal gammopathy of undetermined significance  D47.2 273.1   4. Screening PSA (prostate specific antigen)  Z12.5 V76.44   5. Elevated liver enzymes  R74.8 790.5   6. Mixed hyperlipidemia  E78.2 272.2   7. Hypertension, essential  I10 401.9   8. IFG (impaired fasting glucose)  R73.01 790.21   9. Paroxysmal atrial fibrillation  I48.0 427.31   10. Gastroesophageal reflux disease without esophagitis  K21.9 530.81   11. Stage 3a chronic kidney disease  N18.31 585.3       Assessment and Plan   Diagnoses and all orders for this visit:    1. Polycythemia (Primary)  -     PSA Screen; Future  -     TSH+Free T4; Future  -     Sedimentation Rate; Future  -     Hemoglobin A1c; Future  -     Comprehensive Metabolic Panel; Future  -     CBC & Differential; Future  -     Lipid Panel; Future  -     C-reactive Protein; Future    2. JAMEE (obstructive sleep apnea)  -     PSA Screen; Future  -     TSH+Free T4; Future  -     Sedimentation Rate; Future  -     Hemoglobin A1c; Future  -     Comprehensive Metabolic Panel; Future  -     CBC & Differential; Future  -     Lipid Panel; Future  -     C-reactive Protein; Future    3. Monoclonal gammopathy of undetermined significance  -     PSA Screen; Future  -     TSH+Free T4; Future  -     Sedimentation Rate; Future  -     Hemoglobin A1c; Future  -     Comprehensive Metabolic Panel; Future  -     CBC & Differential; Future  -     Lipid Panel; Future  -     C-reactive Protein; Future    4. Screening PSA  (prostate specific antigen)  -     PSA Screen; Future  -     TSH+Free T4; Future  -     Sedimentation Rate; Future  -     Hemoglobin A1c; Future  -     Comprehensive Metabolic Panel; Future  -     CBC & Differential; Future  -     Lipid Panel; Future  -     C-reactive Protein; Future    5. Elevated liver enzymes  -     PSA Screen; Future  -     TSH+Free T4; Future  -     Sedimentation Rate; Future  -     Hemoglobin A1c; Future  -     Comprehensive Metabolic Panel; Future  -     CBC & Differential; Future  -     Lipid Panel; Future  -     C-reactive Protein; Future    6. Mixed hyperlipidemia  -     PSA Screen; Future  -     TSH+Free T4; Future  -     Sedimentation Rate; Future  -     Hemoglobin A1c; Future  -     Comprehensive Metabolic Panel; Future  -     CBC & Differential; Future  -     Lipid Panel; Future  -     C-reactive Protein; Future    7. Hypertension, essential  -     PSA Screen; Future  -     TSH+Free T4; Future  -     Sedimentation Rate; Future  -     Hemoglobin A1c; Future  -     Comprehensive Metabolic Panel; Future  -     CBC & Differential; Future  -     Lipid Panel; Future  -     C-reactive Protein; Future    8. IFG (impaired fasting glucose)  -     PSA Screen; Future  -     TSH+Free T4; Future  -     Sedimentation Rate; Future  -     Hemoglobin A1c; Future  -     Comprehensive Metabolic Panel; Future  -     CBC & Differential; Future  -     Lipid Panel; Future  -     C-reactive Protein; Future    9. Paroxysmal atrial fibrillation  -     PSA Screen; Future  -     TSH+Free T4; Future  -     Sedimentation Rate; Future  -     Hemoglobin A1c; Future  -     Comprehensive Metabolic Panel; Future  -     CBC & Differential; Future  -     Lipid Panel; Future  -     C-reactive Protein; Future    10. Gastroesophageal reflux disease without esophagitis  -     PSA Screen; Future  -     TSH+Free T4; Future  -     Sedimentation Rate; Future  -     Hemoglobin A1c; Future  -     Comprehensive Metabolic Panel;  Future  -     CBC & Differential; Future  -     Lipid Panel; Future  -     C-reactive Protein; Future    11. Stage 3a chronic kidney disease  -     PSA Screen; Future  -     TSH+Free T4; Future  -     Sedimentation Rate; Future  -     Hemoglobin A1c; Future  -     Comprehensive Metabolic Panel; Future  -     CBC & Differential; Future  -     Lipid Panel; Future  -     C-reactive Protein; Future    Other orders  -     pantoprazole (PROTONIX) 40 MG EC tablet; Take 1 tablet by mouth Daily.  Dispense: 90 tablet; Refill: 3    R elan, --April 2024, --dr casas Hathaway, courtney/ derrek --    Stress test April 7, 2023 shows normal exercise capacity low risk study no evidence of ischemia, Dr. Lr    CKD stage IIIa,, creatinine better at 1.1 June 2024    Osteomyelitis, right ankle, has finished up course of antibiotics treatment options including continue doxycycline for 6 months versus following sed rates retreating if sed rates increase, discussed with patient December 9, 2021 --------------------patient had surgery for I&D and bone biopsy sept 1, 2021--- showing MRSA and gout into the right ankle and foot on September 1, 2021 by Dr. Turner at [facility] --- through [facility], had a PICC line placed, was put on IV antibiotics by infectious disease doctor  on vancomycin every 12 hours at home, for total of 6 weeks----released from orthopedics care as of January 2022,, sed rate CRPs both levels are undetectable or essentially 0 as of March 2022,     Impaired fasting glucose, hemoglobin A1c 5.6 Ana Maria 10, 2024    LAP CHOLEY, AND POST OP BILE LEAK/ CBD OBSTRUCTION, READMITTED,AND HAD ERCP WITH DR PICKARD--- DEVELOPED AFIB WITH RVR, SEPTIC , ELEVATED WBC, APRIL 2019, CARDIOVERTED, WAS RX WITH ABX, HAD MARIAN DRAIN PLACED BY RADIOLOGY-- AND THEN ANOTHER MARIAN DRAIN PLACED, --THORACENTISIS ALSO DONE TIMES 2, --HAD STENT REMOVED JUNE 2019, HAD STRESS TEST WITH [facility],, May 2019, no evidence of ischemia and normal  myocardial SPECT perfusion study,-------CARDIAC EVENT MONITOR 8/2019, --NO AFIB - Stopped eliquis October 2019    Gout, cont allopurinol 300 mg qd, uric acid level 4.9 December 2022,    VASC CALCIFICATIONS SEEN ON CAROTIDS ---ON PLAIN XRAY PER [facility] FOR BONE SURVERY ----- CAROTID U/S, MARCH 2021,, no stenosis present carotid bifurcations, mild to moderate mixed density nonstenotic plaque bilateral, vertebral flow antegrade,, continue statin therapy if possible,    POLYCYTHEMIA, -stable improved Ana Maria 10, 2024 ---- had issue with being presyncope vasovagal when he was going to get it phlebotomized,     JAMEE ---because of polycythemia, sleep study at the request of the [place] physician, June 2023,, now on CPAP, June 2024    Psa= 0.862 dec 2023     HTN--cont ATACAND 32 MG QD, METOPROLOL  75 QD     ELEVATED CHOL.--continues Qunol, Crestor, 15 mg daily, Zetia 10 mg daily    ELEVATED LFTS, KEENAN--going back to 2016--currently normal June 8, 2023,------- ultrasounds liver reviewed, October 2017 and March 2017, shows improvement in liver size,---Has had ultrasounds and fibrosis scan October 2019, slightly elevated score of 0.39 stage F1 to F2, moderate steatosis, has been followed by GI service, -- ultrasound March 5, 2021 shows normal echogenicity of the liver texture, no liver lesions, no biliary ductal dilation, otherwise unremarkable--ultrasound of the liver September 1, 2022 shows liver size at 16.1 cm otherwise unremarkable scan,    MONOCLONAL PARAPROTEINURIA, DX IN 2015-16, NOW GOING TO [place] FOR F/U Total proteins have been stable over the last several years through [place] labs, no treatment at this time    OA, OF L HIP AND NICOLAS 12/13, KAREN,,     OVERWGT-continue weight loss efforts, start walking more, exercise if he can,, discussed December 2023    LVH ON EKG AND ECHO 2016    CSCOPE 9/13 AND EGD , AND AUG 2017, NEERAJ colonoscopy August 19, 2022 tubular adenoma,       Follow Up   No follow-ups on  file.  Patient was given instructions and counseling regarding his condition or for health maintenance advice. Please see specific information pulled into the AVS if appropriate.

## 2024-06-24 ENCOUNTER — HOSPITAL ENCOUNTER (OUTPATIENT)
Facility: HOSPITAL | Age: 64
Setting detail: OBSERVATION
Discharge: HOME | End: 2024-06-25
Attending: EMERGENCY MEDICINE | Admitting: INTERNAL MEDICINE
Payer: COMMERCIAL

## 2024-06-24 ENCOUNTER — APPOINTMENT (OUTPATIENT)
Dept: CT IMAGING | Facility: HOSPITAL | Age: 64
End: 2024-06-24
Payer: COMMERCIAL

## 2024-06-24 DIAGNOSIS — G45.9 TIA (TRANSIENT ISCHEMIC ATTACK): Primary | ICD-10-CM

## 2024-06-24 DIAGNOSIS — R26.2 DIFFICULTY IN WALKING: ICD-10-CM

## 2024-06-24 DIAGNOSIS — I65.23 BILATERAL CAROTID ARTERY STENOSIS: ICD-10-CM

## 2024-06-24 LAB — GLUCOSE BLDC GLUCOMTR-MCNC: 117 MG/DL (ref 70–99)

## 2024-06-24 PROCEDURE — 70496 CT ANGIOGRAPHY HEAD: CPT

## 2024-06-24 PROCEDURE — 86901 BLOOD TYPING SEROLOGIC RH(D): CPT

## 2024-06-24 PROCEDURE — 99291 CRITICAL CARE FIRST HOUR: CPT

## 2024-06-24 PROCEDURE — 80053 COMPREHEN METABOLIC PANEL: CPT

## 2024-06-24 PROCEDURE — 70498 CT ANGIOGRAPHY NECK: CPT

## 2024-06-24 PROCEDURE — 93005 ELECTROCARDIOGRAM TRACING: CPT

## 2024-06-24 PROCEDURE — 85730 THROMBOPLASTIN TIME PARTIAL: CPT

## 2024-06-24 PROCEDURE — 70450 CT HEAD/BRAIN W/O DYE: CPT

## 2024-06-24 PROCEDURE — 82948 REAGENT STRIP/BLOOD GLUCOSE: CPT

## 2024-06-24 PROCEDURE — 0042T HC CT CEREBRAL PERFUSION W/WO CONTRAST: CPT

## 2024-06-24 PROCEDURE — 84484 ASSAY OF TROPONIN QUANT: CPT

## 2024-06-24 PROCEDURE — 86900 BLOOD TYPING SEROLOGIC ABO: CPT

## 2024-06-24 PROCEDURE — 86850 RBC ANTIBODY SCREEN: CPT

## 2024-06-24 PROCEDURE — G0378 HOSPITAL OBSERVATION PER HR: HCPCS

## 2024-06-24 PROCEDURE — 99204 OFFICE O/P NEW MOD 45 MIN: CPT | Performed by: PSYCHIATRY & NEUROLOGY

## 2024-06-24 PROCEDURE — 99285 EMERGENCY DEPT VISIT HI MDM: CPT

## 2024-06-24 PROCEDURE — 85025 COMPLETE CBC W/AUTO DIFF WBC: CPT

## 2024-06-24 PROCEDURE — 85610 PROTHROMBIN TIME: CPT

## 2024-06-24 PROCEDURE — 36415 COLL VENOUS BLD VENIPUNCTURE: CPT

## 2024-06-24 RX ORDER — SODIUM CHLORIDE 0.9 % (FLUSH) 0.9 %
10 SYRINGE (ML) INJECTION AS NEEDED
Status: DISCONTINUED | OUTPATIENT
Start: 2024-06-24 | End: 2024-06-25 | Stop reason: HOSPADM

## 2024-06-25 ENCOUNTER — APPOINTMENT (OUTPATIENT)
Dept: MRI IMAGING | Facility: HOSPITAL | Age: 64
End: 2024-06-25
Payer: COMMERCIAL

## 2024-06-25 ENCOUNTER — READMISSION MANAGEMENT (OUTPATIENT)
Dept: CALL CENTER | Facility: HOSPITAL | Age: 64
End: 2024-06-25
Payer: COMMERCIAL

## 2024-06-25 ENCOUNTER — TELEPHONE (OUTPATIENT)
Dept: CARDIOLOGY | Facility: CLINIC | Age: 64
End: 2024-06-25

## 2024-06-25 ENCOUNTER — APPOINTMENT (OUTPATIENT)
Dept: GENERAL RADIOLOGY | Facility: HOSPITAL | Age: 64
End: 2024-06-25
Payer: COMMERCIAL

## 2024-06-25 ENCOUNTER — APPOINTMENT (OUTPATIENT)
Dept: CARDIOLOGY | Facility: HOSPITAL | Age: 64
End: 2024-06-25
Payer: COMMERCIAL

## 2024-06-25 VITALS
DIASTOLIC BLOOD PRESSURE: 86 MMHG | OXYGEN SATURATION: 95 % | TEMPERATURE: 97.2 F | HEIGHT: 67 IN | WEIGHT: 250.44 LBS | BODY MASS INDEX: 39.31 KG/M2 | HEART RATE: 62 BPM | SYSTOLIC BLOOD PRESSURE: 124 MMHG | RESPIRATION RATE: 18 BRPM

## 2024-06-25 PROBLEM — G45.9 TIA (TRANSIENT ISCHEMIC ATTACK): Status: ACTIVE | Noted: 2024-06-25

## 2024-06-25 LAB
ABO GROUP BLD: NORMAL
ABO GROUP BLD: NORMAL
ALBUMIN SERPL-MCNC: 3.6 G/DL (ref 3.5–5.2)
ALBUMIN/GLOB SERPL: 1.4 G/DL
ALP SERPL-CCNC: 82 U/L (ref 39–117)
ALT SERPL W P-5'-P-CCNC: 27 U/L (ref 1–41)
ANION GAP SERPL CALCULATED.3IONS-SCNC: 9.1 MMOL/L (ref 5–15)
AORTIC DIMENSIONLESS INDEX: 0.73 (DI)
APTT PPP: 29.5 SECONDS (ref 24.2–34.2)
ASCENDING AORTA: 3.3 CM
AST SERPL-CCNC: 26 U/L (ref 1–40)
BASOPHILS # BLD AUTO: 0.06 10*3/MM3 (ref 0–0.2)
BASOPHILS NFR BLD AUTO: 0.7 % (ref 0–1.5)
BH CV ECHO MEAS - AO MAX PG: 12.3 MMHG
BH CV ECHO MEAS - AO MEAN PG: 5.9 MMHG
BH CV ECHO MEAS - AO ROOT DIAM: 3.2 CM
BH CV ECHO MEAS - AO V2 MAX: 175.3 CM/SEC
BH CV ECHO MEAS - AO V2 VTI: 34.5 CM
BH CV ECHO MEAS - AVA(I,D): 3.4 CM2
BH CV ECHO MEAS - EDV(CUBED): 84.3 ML
BH CV ECHO MEAS - EDV(MOD-SP2): 85.6 ML
BH CV ECHO MEAS - EDV(MOD-SP4): 118 ML
BH CV ECHO MEAS - EF(MOD-BP): 61.6 %
BH CV ECHO MEAS - EF(MOD-SP2): 59.7 %
BH CV ECHO MEAS - EF(MOD-SP4): 61.8 %
BH CV ECHO MEAS - ESV(CUBED): 21.7 ML
BH CV ECHO MEAS - ESV(MOD-SP2): 34.5 ML
BH CV ECHO MEAS - ESV(MOD-SP4): 45.1 ML
BH CV ECHO MEAS - FS: 36.4 %
BH CV ECHO MEAS - IVS/LVPW: 1.67 CM
BH CV ECHO MEAS - IVSD: 1.65 CM
BH CV ECHO MEAS - LA DIMENSION: 5.1 CM
BH CV ECHO MEAS - LAT PEAK E' VEL: 11.5 CM/SEC
BH CV ECHO MEAS - LV DIASTOLIC VOL/BSA (35-75): 53.1 CM2
BH CV ECHO MEAS - LV MASS(C)D: 219.5 GRAMS
BH CV ECHO MEAS - LV MAX PG: 7 MMHG
BH CV ECHO MEAS - LV MEAN PG: 3.3 MMHG
BH CV ECHO MEAS - LV SYSTOLIC VOL/BSA (12-30): 20.3 CM2
BH CV ECHO MEAS - LV V1 MAX: 132.2 CM/SEC
BH CV ECHO MEAS - LV V1 VTI: 25.1 CM
BH CV ECHO MEAS - LVIDD: 4.4 CM
BH CV ECHO MEAS - LVIDS: 2.8 CM
BH CV ECHO MEAS - LVOT AREA: 4.7 CM2
BH CV ECHO MEAS - LVOT DIAM: 2.44 CM
BH CV ECHO MEAS - LVPWD: 0.99 CM
BH CV ECHO MEAS - MED PEAK E' VEL: 6.2 CM/SEC
BH CV ECHO MEAS - MV A MAX VEL: 79.6 CM/SEC
BH CV ECHO MEAS - MV DEC TIME: 0.21 SEC
BH CV ECHO MEAS - MV E MAX VEL: 56.3 CM/SEC
BH CV ECHO MEAS - MV E/A: 0.71
BH CV ECHO MEAS - PA V2 MAX: 119.4 CM/SEC
BH CV ECHO MEAS - SV(LVOT): 117 ML
BH CV ECHO MEAS - SV(MOD-SP2): 51.1 ML
BH CV ECHO MEAS - SV(MOD-SP4): 72.9 ML
BH CV ECHO MEAS - SVI(LVOT): 52.6 ML/M2
BH CV ECHO MEAS - SVI(MOD-SP2): 23 ML/M2
BH CV ECHO MEAS - SVI(MOD-SP4): 32.8 ML/M2
BH CV ECHO MEAS - TAPSE (>1.6): 2.24 CM
BH CV ECHO MEASUREMENTS AVERAGE E/E' RATIO: 6.36
BH CV ECHO SHUNT ASSESSMENT PERFORMED (HIDDEN SCRIPTING): 1
BH CV XLRA - TDI S': 15.9 CM/SEC
BILIRUB SERPL-MCNC: 0.3 MG/DL (ref 0–1.2)
BILIRUB UR QL STRIP: NEGATIVE
BLD GP AB SCN SERPL QL: NEGATIVE
BUN SERPL-MCNC: 17 MG/DL (ref 8–23)
BUN/CREAT SERPL: 14.7 (ref 7–25)
CALCIUM SPEC-SCNC: 8.7 MG/DL (ref 8.6–10.5)
CHLORIDE SERPL-SCNC: 101 MMOL/L (ref 98–107)
CHOLEST SERPL-MCNC: 120 MG/DL (ref 0–200)
CLARITY UR: CLEAR
CO2 SERPL-SCNC: 26.9 MMOL/L (ref 22–29)
COLOR UR: YELLOW
CREAT SERPL-MCNC: 1.16 MG/DL (ref 0.76–1.27)
DEPRECATED RDW RBC AUTO: 45.3 FL (ref 37–54)
EGFRCR SERPLBLD CKD-EPI 2021: 70.8 ML/MIN/1.73
EOSINOPHIL # BLD AUTO: 0.51 10*3/MM3 (ref 0–0.4)
EOSINOPHIL NFR BLD AUTO: 6.3 % (ref 0.3–6.2)
ERYTHROCYTE [DISTWIDTH] IN BLOOD BY AUTOMATED COUNT: 13.2 % (ref 12.3–15.4)
GLOBULIN UR ELPH-MCNC: 2.6 GM/DL
GLUCOSE BLDC GLUCOMTR-MCNC: 118 MG/DL (ref 70–99)
GLUCOSE SERPL-MCNC: 104 MG/DL (ref 65–99)
GLUCOSE UR STRIP-MCNC: NEGATIVE MG/DL
HBA1C MFR BLD: 5.7 % (ref 4.8–5.6)
HCT VFR BLD AUTO: 43.6 % (ref 37.5–51)
HDLC SERPL-MCNC: 31 MG/DL (ref 40–60)
HGB BLD-MCNC: 14.6 G/DL (ref 13–17.7)
HGB UR QL STRIP.AUTO: NEGATIVE
HOLD SPECIMEN: NORMAL
HOLD SPECIMEN: NORMAL
IMM GRANULOCYTES # BLD AUTO: 0.03 10*3/MM3 (ref 0–0.05)
IMM GRANULOCYTES NFR BLD AUTO: 0.4 % (ref 0–0.5)
INR PPP: 1.1 (ref 0.86–1.15)
IVRT: 82 MS
KETONES UR QL STRIP: NEGATIVE
LDLC SERPL CALC-MCNC: 69 MG/DL (ref 0–100)
LDLC/HDLC SERPL: 2.19 {RATIO}
LEFT ATRIUM VOLUME INDEX: 24.1 ML/M2
LEUKOCYTE ESTERASE UR QL STRIP.AUTO: NEGATIVE
LYMPHOCYTES # BLD AUTO: 1.92 10*3/MM3 (ref 0.7–3.1)
LYMPHOCYTES NFR BLD AUTO: 23.6 % (ref 19.6–45.3)
MCH RBC QN AUTO: 31.7 PG (ref 26.6–33)
MCHC RBC AUTO-ENTMCNC: 33.5 G/DL (ref 31.5–35.7)
MCV RBC AUTO: 94.6 FL (ref 79–97)
MONOCYTES # BLD AUTO: 0.86 10*3/MM3 (ref 0.1–0.9)
MONOCYTES NFR BLD AUTO: 10.6 % (ref 5–12)
NEUTROPHILS NFR BLD AUTO: 4.74 10*3/MM3 (ref 1.7–7)
NEUTROPHILS NFR BLD AUTO: 58.4 % (ref 42.7–76)
NITRITE UR QL STRIP: NEGATIVE
NRBC BLD AUTO-RTO: 0 /100 WBC (ref 0–0.2)
PH UR STRIP.AUTO: 5.5 [PH] (ref 5–8)
PLATELET # BLD AUTO: 178 10*3/MM3 (ref 140–450)
PMV BLD AUTO: 10.7 FL (ref 6–12)
POTASSIUM SERPL-SCNC: 3.9 MMOL/L (ref 3.5–5.2)
PROT SERPL-MCNC: 6.2 G/DL (ref 6–8.5)
PROT UR QL STRIP: NEGATIVE
PROTHROMBIN TIME: 14.4 SECONDS (ref 11.8–14.9)
QT INTERVAL: 437 MS
QTC INTERVAL: 460 MS
RBC # BLD AUTO: 4.61 10*6/MM3 (ref 4.14–5.8)
RH BLD: POSITIVE
RH BLD: POSITIVE
SODIUM SERPL-SCNC: 137 MMOL/L (ref 136–145)
SP GR UR STRIP: >1.03 (ref 1–1.03)
T&S EXPIRATION DATE: NORMAL
TRIGL SERPL-MCNC: 106 MG/DL (ref 0–150)
TROPONIN T SERPL HS-MCNC: 22 NG/L
UROBILINOGEN UR QL STRIP: ABNORMAL
VLDLC SERPL-MCNC: 20 MG/DL (ref 5–40)
WBC NRBC COR # BLD AUTO: 8.12 10*3/MM3 (ref 3.4–10.8)
WHOLE BLOOD HOLD COAG: NORMAL
WHOLE BLOOD HOLD SPECIMEN: NORMAL

## 2024-06-25 PROCEDURE — 86901 BLOOD TYPING SEROLOGIC RH(D): CPT

## 2024-06-25 PROCEDURE — 99239 HOSP IP/OBS DSCHRG MGMT >30: CPT | Performed by: INTERNAL MEDICINE

## 2024-06-25 PROCEDURE — 93306 TTE W/DOPPLER COMPLETE: CPT

## 2024-06-25 PROCEDURE — 25010000002 ENOXAPARIN PER 10 MG: Performed by: INTERNAL MEDICINE

## 2024-06-25 PROCEDURE — G0378 HOSPITAL OBSERVATION PER HR: HCPCS

## 2024-06-25 PROCEDURE — 92610 EVALUATE SWALLOWING FUNCTION: CPT

## 2024-06-25 PROCEDURE — 97161 PT EVAL LOW COMPLEX 20 MIN: CPT

## 2024-06-25 PROCEDURE — 99222 1ST HOSP IP/OBS MODERATE 55: CPT | Performed by: INTERNAL MEDICINE

## 2024-06-25 PROCEDURE — 83036 HEMOGLOBIN GLYCOSYLATED A1C: CPT | Performed by: INTERNAL MEDICINE

## 2024-06-25 PROCEDURE — 70551 MRI BRAIN STEM W/O DYE: CPT

## 2024-06-25 PROCEDURE — 71045 X-RAY EXAM CHEST 1 VIEW: CPT

## 2024-06-25 PROCEDURE — 96372 THER/PROPH/DIAG INJ SC/IM: CPT

## 2024-06-25 PROCEDURE — 86900 BLOOD TYPING SEROLOGIC ABO: CPT

## 2024-06-25 PROCEDURE — 80061 LIPID PANEL: CPT | Performed by: INTERNAL MEDICINE

## 2024-06-25 PROCEDURE — 93306 TTE W/DOPPLER COMPLETE: CPT | Performed by: INTERNAL MEDICINE

## 2024-06-25 PROCEDURE — 25510000001 IOPAMIDOL PER 1 ML: Performed by: EMERGENCY MEDICINE

## 2024-06-25 PROCEDURE — 81003 URINALYSIS AUTO W/O SCOPE: CPT

## 2024-06-25 PROCEDURE — 82948 REAGENT STRIP/BLOOD GLUCOSE: CPT

## 2024-06-25 PROCEDURE — 99213 OFFICE O/P EST LOW 20 MIN: CPT | Performed by: INTERNAL MEDICINE

## 2024-06-25 RX ORDER — SODIUM CHLORIDE 0.9 % (FLUSH) 0.9 %
10 SYRINGE (ML) INJECTION EVERY 12 HOURS SCHEDULED
Status: DISCONTINUED | OUTPATIENT
Start: 2024-06-25 | End: 2024-06-25 | Stop reason: HOSPADM

## 2024-06-25 RX ORDER — CLOPIDOGREL BISULFATE 75 MG/1
75 TABLET ORAL DAILY
Status: DISCONTINUED | OUTPATIENT
Start: 2024-06-25 | End: 2024-06-25 | Stop reason: HOSPADM

## 2024-06-25 RX ORDER — PANTOPRAZOLE SODIUM 40 MG/1
40 TABLET, DELAYED RELEASE ORAL
Status: DISCONTINUED | OUTPATIENT
Start: 2024-06-25 | End: 2024-06-25 | Stop reason: HOSPADM

## 2024-06-25 RX ORDER — ALLOPURINOL 300 MG/1
300 TABLET ORAL DAILY
Status: DISCONTINUED | OUTPATIENT
Start: 2024-06-25 | End: 2024-06-25 | Stop reason: HOSPADM

## 2024-06-25 RX ORDER — LORAZEPAM 0.5 MG/1
0.5 TABLET ORAL ONCE AS NEEDED
Status: DISCONTINUED | OUTPATIENT
Start: 2024-06-25 | End: 2024-06-25

## 2024-06-25 RX ORDER — ROSUVASTATIN CALCIUM 5 MG/1
15 TABLET, COATED ORAL DAILY
Status: DISCONTINUED | OUTPATIENT
Start: 2024-06-25 | End: 2024-06-25 | Stop reason: HOSPADM

## 2024-06-25 RX ORDER — ACETAMINOPHEN 650 MG/1
650 SUPPOSITORY RECTAL EVERY 4 HOURS PRN
Status: DISCONTINUED | OUTPATIENT
Start: 2024-06-25 | End: 2024-06-25 | Stop reason: HOSPADM

## 2024-06-25 RX ORDER — SODIUM CHLORIDE 9 MG/ML
40 INJECTION, SOLUTION INTRAVENOUS AS NEEDED
Status: DISCONTINUED | OUTPATIENT
Start: 2024-06-25 | End: 2024-06-25 | Stop reason: HOSPADM

## 2024-06-25 RX ORDER — SODIUM CHLORIDE 0.9 % (FLUSH) 0.9 %
10 SYRINGE (ML) INJECTION AS NEEDED
Status: DISCONTINUED | OUTPATIENT
Start: 2024-06-25 | End: 2024-06-25 | Stop reason: HOSPADM

## 2024-06-25 RX ORDER — CLOPIDOGREL BISULFATE 75 MG/1
75 TABLET ORAL ONCE
Status: COMPLETED | OUTPATIENT
Start: 2024-06-25 | End: 2024-06-25

## 2024-06-25 RX ORDER — ENOXAPARIN SODIUM 100 MG/ML
40 INJECTION SUBCUTANEOUS DAILY
Status: DISCONTINUED | OUTPATIENT
Start: 2024-06-25 | End: 2024-06-25 | Stop reason: HOSPADM

## 2024-06-25 RX ORDER — ATORVASTATIN CALCIUM 40 MG/1
80 TABLET, FILM COATED ORAL NIGHTLY
Status: DISCONTINUED | OUTPATIENT
Start: 2024-06-25 | End: 2024-06-25

## 2024-06-25 RX ORDER — VALSARTAN 80 MG/1
320 TABLET ORAL
Status: DISCONTINUED | OUTPATIENT
Start: 2024-06-25 | End: 2024-06-25 | Stop reason: HOSPADM

## 2024-06-25 RX ORDER — LORAZEPAM 0.5 MG/1
1 TABLET ORAL ONCE AS NEEDED
Status: COMPLETED | OUTPATIENT
Start: 2024-06-25 | End: 2024-06-25

## 2024-06-25 RX ORDER — ACETAMINOPHEN 325 MG/1
650 TABLET ORAL EVERY 4 HOURS PRN
Status: DISCONTINUED | OUTPATIENT
Start: 2024-06-25 | End: 2024-06-25 | Stop reason: HOSPADM

## 2024-06-25 RX ORDER — ASPIRIN 81 MG/1
81 TABLET ORAL DAILY
Status: DISCONTINUED | OUTPATIENT
Start: 2024-06-25 | End: 2024-06-25 | Stop reason: HOSPADM

## 2024-06-25 RX ADMIN — CLOPIDOGREL BISULFATE 75 MG: 75 TABLET ORAL at 01:53

## 2024-06-25 RX ADMIN — Medication 10 ML: at 08:28

## 2024-06-25 RX ADMIN — CLOPIDOGREL BISULFATE 75 MG: 75 TABLET ORAL at 08:27

## 2024-06-25 RX ADMIN — IOPAMIDOL 150 ML: 755 INJECTION, SOLUTION INTRAVENOUS at 00:14

## 2024-06-25 RX ADMIN — ROSUVASTATIN CALCIUM 15 MG: 5 TABLET, FILM COATED ORAL at 08:27

## 2024-06-25 RX ADMIN — VALSARTAN 320 MG: 80 TABLET, FILM COATED ORAL at 08:27

## 2024-06-25 RX ADMIN — ASPIRIN 81 MG: 81 TABLET, COATED ORAL at 08:27

## 2024-06-25 RX ADMIN — ENOXAPARIN SODIUM 40 MG: 100 INJECTION SUBCUTANEOUS at 08:29

## 2024-06-25 RX ADMIN — PANTOPRAZOLE SODIUM 40 MG: 40 TABLET, DELAYED RELEASE ORAL at 08:27

## 2024-06-25 RX ADMIN — LORAZEPAM 1 MG: 0.5 TABLET ORAL at 10:49

## 2024-06-25 RX ADMIN — ALLOPURINOL 300 MG: 300 TABLET ORAL at 08:27

## 2024-06-25 NOTE — CONSULTS
Met with patient at bedside. Discussed current A1c of 5.7% with an estimated average glucose of 117 mg/dl. Patient states he is aware he has prediabetes and his PCP is monitoring him. He does not need any further information at this time.

## 2024-06-25 NOTE — ED PROVIDER NOTES
Time: 11:14 PM EDT  Date of encounter:  6/24/2024  Independent Historian/Clinical History and Information was obtained by:   Patient    History is limited by: N/A    Chief Complaint: Slurred speech      History of Present Illness:  Patient is a 63 y.o. year old male who presents to the emergency department for evaluation of slurred speech about an hour and a half PTA.  States that his tongue was numb.  Patient states this was twice this week that occurred.  The patient states the first time this occurred was about a week ago when he was in [place].  He states his went numb and he felt like his tongue was thick and he was having difficulty speaking.  He states only lasted for about a minute.  He had no further symptoms until tonight similar symptoms occurred around 7:30 PM.  He states that his tongue went numb and he may be had some numbness around his face and then he had some difficulties with speech.  He states it only lasted for a minute and resolved.  He has not had recurrent symptoms.  Patient denies a headache.  The patient denies dizziness.  The patient denies any numbness or weakness in his arms or legs.  The patient denies any change in vision or loss of visual field.  He states that the symptoms have not reoccurred since 7:30 PM      HPI    Patient Care Team  Primary Care Provider: Stanford Howe MD    Past Medical History:     No Known Allergies  Past Medical History:   Diagnosis Date    A-fib     Acid reflux     Arthritis     Carotid atherosclerosis, bilateral 12/09/2021    Colon polyps     Essential hypertension     Family history of colon cancer     Fatty liver     GERD (gastroesophageal reflux disease)     Hip pain     LEFT     Insomnia     MGUS (monoclonal gammopathy of unknown significance)     Osteomyelitis of ankle     patient reports right ankle    Pes cavus of left foot 01/12/2019    Primary osteoarthritis of left foot 01/12/2019    Sciatic nerve pain      Past Surgical History:    Procedure Laterality Date    ANKLE SURGERY Right 1977    RIGHT ANKLE CLOSED REDUCTION     CARDIOVERSION      CHOLECYSTECTOMY      LAPAROSCOPIC    COLONOSCOPY  04/21/2017    DR. PICKARD    COLONOSCOPY N/A 8/19/2022    Procedure: COLONOSCOPY WITH POLYPECTOMY;  Surgeon: Janes Pickard MD;  Location: [facility] ENDOSCOPY;  Service: Gastroenterology;  Laterality: N/A;  COLON POLYP, DIVERTICULOSIS    ENDOSCOPY  08/09/2013        HIP ARTHROPLASTY Left     HIP BIPOLAR REPLACEMENT Left     OTHER SURGICAL HISTORY      ARTIFICAL JOINTS/LIMBS     OTHER SURGICAL HISTORY      JOINT SURGERY    OTHER SURGICAL HISTORY      I & D for osteomylitis right foot     Family History   Problem Relation Age of Onset    Stroke Mother     Arthritis Mother     Thyroid disease Mother     Prostate cancer Father 69    Heart disease Father     Colon cancer Paternal Aunt     Colon cancer Paternal Uncle     Colon cancer Paternal Grandmother 60       Home Medications:  Prior to Admission medications    Medication Sig Start Date End Date Taking? Authorizing Provider   allopurinol (ZYLOPRIM) 300 MG tablet TAKE 1 TABLET BY MOUTH DAILY 6/5/24   Stanford Howe MD   aspirin 81 MG EC tablet Aspir-81 81 mg oral tablet,delayed release (DR/EC) take 1 tablet (81 mg) by oral route once daily   Active    Emergency, Nurse Luis, RN   candesartan (ATACAND) 32 MG tablet Take 1 tablet by mouth Daily. 3/7/24   Francesco Lr MD   cholecalciferol (VITAMIN D3) 25 MCG (1000 UT) tablet Take 1 tablet by mouth Daily.    Provider, MD Melissa   coenzyme Q10 100 MG capsule Take 1 capsule by mouth Daily.    ProviderMelissa MD   colestipol (COLESTID) 5 g granules Take 1 g by mouth.    ProviderMelissa MD   ezetimibe (ZETIA) 10 MG tablet Take 1 tablet by mouth Daily. 11/3/23   Carmen Crawford APRN   Loratadine 10 MG capsule As Needed.    Emergency, Nurse Epic, RN   meloxicam (MOBIC) 15 MG tablet TAKE 1 TABLET BY MOUTH DAILY  Patient taking  "differently: Take 1 tablet by mouth Daily. prn 4/29/24   Cassius Reilly MD   metoprolol succinate XL (TOPROL-XL) 50 MG 24 hr tablet Take 1.5 tablets by mouth Every Night. 3/7/24   Francesco Lr MD   pantoprazole (PROTONIX) 40 MG EC tablet Take 1 tablet by mouth Daily. 6/13/24   Stanford Howe MD   rosuvastatin (CRESTOR) 10 MG tablet Take 1.5 tablets by mouth Daily. 3/7/24   Francesco Lr MD   Turmeric 1053 MG tablet turmeric 400 mg oral capsule take 1 capsule by oral route daily   Active    Emergency, Nurse Epic, RN        Social History:   Social History     Tobacco Use    Smoking status: Never    Smokeless tobacco: Never   Vaping Use    Vaping status: Never Used   Substance Use Topics    Alcohol use: Yes     Alcohol/week: 3.0 standard drinks of alcohol     Types: 3 Cans of beer per week     Comment: occasionally    Drug use: Never         Review of Systems:  Review of Systems   Constitutional:  Negative for chills, diaphoresis and fever.   HENT:  Negative for congestion, postnasal drip, rhinorrhea and sore throat.    Eyes:  Negative for photophobia.   Respiratory:  Negative for cough, chest tightness and shortness of breath.    Cardiovascular:  Negative for chest pain, palpitations and leg swelling.   Gastrointestinal:  Negative for abdominal pain, diarrhea, nausea and vomiting.   Genitourinary:  Negative for difficulty urinating, dysuria, flank pain, frequency, hematuria and urgency.   Musculoskeletal:  Negative for neck pain and neck stiffness.   Skin:  Negative for pallor and rash.   Neurological:  Positive for speech difficulty and numbness. Negative for dizziness, syncope, facial asymmetry, weakness and headaches.   Hematological:  Negative for adenopathy. Does not bruise/bleed easily.   Psychiatric/Behavioral: Negative.          Physical Exam:  /80 (BP Location: Left arm, Patient Position: Sitting)   Pulse 71   Temp 98 °F (36.7 °C) (Oral)   Resp 20   Ht 170.2 cm (67\")   Wt 113 kg (250 " lb)   SpO2 96%   BMI 39.16 kg/m²     Physical Exam  Vitals and nursing note reviewed.   Constitutional:       General: He is not in acute distress.     Appearance: Normal appearance. He is not ill-appearing, toxic-appearing or diaphoretic.   HENT:      Head: Normocephalic and atraumatic.      Mouth/Throat:      Mouth: Mucous membranes are moist.   Eyes:      General: No visual field deficit.     Extraocular Movements: Extraocular movements intact.      Conjunctiva/sclera: Conjunctivae normal.      Pupils: Pupils are equal, round, and reactive to light.   Cardiovascular:      Rate and Rhythm: Normal rate and regular rhythm.      Pulses: Normal pulses.           Carotid pulses are 2+ on the right side and 2+ on the left side.       Radial pulses are 2+ on the right side and 2+ on the left side.        Femoral pulses are 2+ on the right side and 2+ on the left side.       Popliteal pulses are 2+ on the right side and 2+ on the left side.        Dorsalis pedis pulses are 2+ on the right side and 2+ on the left side.        Posterior tibial pulses are 2+ on the right side and 2+ on the left side.      Heart sounds: Normal heart sounds. No murmur heard.  Pulmonary:      Effort: Pulmonary effort is normal. No accessory muscle usage, respiratory distress or retractions.      Breath sounds: Normal breath sounds. No wheezing, rhonchi or rales.   Abdominal:      General: Abdomen is flat. There is no distension.      Palpations: Abdomen is soft. There is no mass or pulsatile mass.      Tenderness: There is no abdominal tenderness. There is no right CVA tenderness, left CVA tenderness, guarding or rebound.      Comments: No rigidity   Musculoskeletal:         General: No swelling, tenderness or deformity.      Cervical back: Neck supple. No tenderness.      Right lower leg: No edema.      Left lower leg: No edema.   Skin:     General: Skin is warm and dry.      Capillary Refill: Capillary refill takes less than 2 seconds.       Coloration: Skin is not cyanotic, jaundiced or pale.      Findings: No erythema.   Neurological:      General: No focal deficit present.      Mental Status: He is alert and oriented to person, place, and time. Mental status is at baseline.      Cranial Nerves: Cranial nerves 2-12 are intact. No cranial nerve deficit, dysarthria or facial asymmetry.      Sensory: Sensation is intact. No sensory deficit.      Motor: Motor function is intact. No weakness or pronator drift.      Coordination: Coordination is intact. Coordination normal. Finger-Nose-Finger Test normal.      Comments: At the time my evaluation the patient had an NIH stroke scale of 0   Psychiatric:         Attention and Perception: Attention and perception normal.         Mood and Affect: Mood normal.         Behavior: Behavior normal.               Procedures:  Procedures      Medical Decision Making:      Comorbidities that affect care:    A-fib, hypertension, GERD, carotid artery stenosis, hyperlipidemia    External Notes reviewed:    None      The following orders were placed and all results were independently analyzed by me:  Orders Placed This Encounter   Procedures    CT Head Without Contrast Stroke Protocol    CT Angiogram Head w AI Analysis of LVO    CT Angiogram Neck    CT CEREBRAL PERFUSION WITH & WITHOUT CONTRAST    XR Chest 1 View    [place] Draw    Comprehensive Metabolic Panel    Protime-INR    aPTT    Single High Sensitivity Troponin T    Urinalysis With Microscopic If Indicated (No Culture) - Urine, Clean Catch    CBC Auto Differential    NPO Diet NPO Type: Strict NPO    Initiate Department's Acute Stroke Process (Team D, Code 19, etc)    Perform NIH Stroke Scale    Measure Actual Weight    Head of Bed 30 Degrees or Less    Undress and Gown    Continuous Pulse Oximetry    Vital Signs    Neuro Checks    Notify Provider for SBP <80 or >200    Notify Provider for SBP >140 (For Hemorrhagic Stroke)    No Hypotonic Fluids    Nursing Dysphagia  Screening (Complete Prior to Giving anything PO)    RN to Place Order SLP Consult (IF swallow screen failed) - Eval & Treat Choosing Reason of RN Dysphagia Screen Failed    Inpatient Hospitalist Consult    Oxygen Therapy- Nasal Cannula; Titrate 1-6 LPM Per SpO2; 90 - 95%    POC Glucose Once    ECG 12 Lead ED Triage Standing Order; Acute Stroke (Onset <12 hrs)    Type & Screen    ABO RH Specimen Verification    Insert Large Bore Peripheral IV - Right AC Preferred    CBC & Differential    Green Top (Gel)    Lavender Top    Gold Top - SST    Light Blue Top       Medications Given in the Emergency Department:  Medications   sodium chloride 0.9 % flush 10 mL (has no administration in time range)   clopidogrel (PLAVIX) tablet 75 mg (has no administration in time range)   iopamidol (ISOVUE-370) 76 % injection 150 mL (150 mL Intravenous Given 6/25/24 0014)        ED Course:    ED Course as of 06/25/24 0152   Mon Jun 24, 2024   2315 --- PROVIDER IN TRIAGE NOTE ---    The patient was evaluated by Brissa dawn in triage. Orders were placed and the patient is currently awaiting disposition.    [AJ]   Tue Jun 25, 2024   0139 EKG:    Rhythm: Sinus rhythm  Rate: 66  Intervals: Normal MD and QT interval  T-wave: T wave inversion III, V3, V4, V5, nonspecific T wave flattening  ST Segment: No pathological ST elevation and reciprocal ST depression to suggest STEMI    EKG Comparison: No EKG available for comparison    Interpreted by me   [SD]      ED Course User Index  [AJ] Brissa Youssef PA-C  [SD] Clarence Donohue DO       Labs:    Lab Results (last 24 hours)       Procedure Component Value Units Date/Time    POC Glucose Once [[id number]]  (Abnormal) Collected: 06/24/24 2350    Specimen: Blood Updated: 06/24/24 2351     Glucose 117 mg/dL      Comment: Serial Number: [id number]:  [id number]       CBC & Differential [[id number]]  (Abnormal) Collected: 06/24/24 2352    Specimen: Blood Updated: 06/25/24 0004    Narrative:       The following orders were created for panel order CBC & Differential.  Procedure                               Abnormality         Status                     ---------                               -----------         ------                     CBC Auto Differential[[id number]]        Abnormal            Final result                 Please view results for these tests on the individual orders.    Comprehensive Metabolic Panel [[id number]]  (Abnormal) Collected: 06/24/24 2352    Specimen: Blood Updated: 06/25/24 0023     Glucose 104 mg/dL      BUN 17 mg/dL      Creatinine 1.16 mg/dL      Sodium 137 mmol/L      Potassium 3.9 mmol/L      Chloride 101 mmol/L      CO2 26.9 mmol/L      Calcium 8.7 mg/dL      Total Protein 6.2 g/dL      Albumin 3.6 g/dL      ALT (SGPT) 27 U/L      AST (SGOT) 26 U/L      Alkaline Phosphatase 82 U/L      Total Bilirubin 0.3 mg/dL      Globulin 2.6 gm/dL      A/G Ratio 1.4 g/dL      BUN/Creatinine Ratio 14.7     Anion Gap 9.1 mmol/L      eGFR 70.8 mL/min/1.73     Narrative:      GFR Normal >60  Chronic Kidney Disease <60  Kidney Failure <15      Protime-INR [[id number]]  (Normal) Collected: 06/24/24 2352    Specimen: Blood Updated: 06/25/24 0013     Protime 14.4 Seconds      INR 1.10    Narrative:      Suggested Therapeutic Ranges For Oral Anticoagulant Therapy:  Level of Therapy                      INR Target Range  Standard Dose                            2.0-3.0  High Dose                                2.5-3.5  Patients not receiving anticoagulant  Therapy Normal Range                     0.86-1.15    aPTT [[id number]]  (Normal) Collected: 06/24/24 2352    Specimen: Blood Updated: 06/25/24 0014     PTT 29.5 seconds     Single High Sensitivity Troponin T [[id number]]  (Abnormal) Collected: 06/24/24 2352    Specimen: Blood Updated: 06/25/24 0023     HS Troponin T 22 ng/L     Narrative:      High Sensitive Troponin T Reference Range:  <14.0 ng/L- Negative Female for AMI  <22.0 ng/L-  Negative Male for AMI  >=14 - Abnormal Female indicating possible myocardial injury.  >=22 - Abnormal Male indicating possible myocardial injury.   Clinicians would have to utilize clinical acumen, EKG, Troponin, and serial changes to determine if it is an Acute Myocardial Infarction or myocardial injury due to an underlying chronic condition.         CBC Auto Differential [[id number]]  (Abnormal) Collected: 06/24/24 2352    Specimen: Blood Updated: 06/25/24 0004     WBC 8.12 10*3/mm3      RBC 4.61 10*6/mm3      Hemoglobin 14.6 g/dL      Hematocrit 43.6 %      MCV 94.6 fL      MCH 31.7 pg      MCHC 33.5 g/dL      RDW 13.2 %      RDW-SD 45.3 fl      MPV 10.7 fL      Platelets 178 10*3/mm3      Neutrophil % 58.4 %      Lymphocyte % 23.6 %      Monocyte % 10.6 %      Eosinophil % 6.3 %      Basophil % 0.7 %      Immature Grans % 0.4 %      Neutrophils, Absolute 4.74 10*3/mm3      Lymphocytes, Absolute 1.92 10*3/mm3      Monocytes, Absolute 0.86 10*3/mm3      Eosinophils, Absolute 0.51 10*3/mm3      Basophils, Absolute 0.06 10*3/mm3      Immature Grans, Absolute 0.03 10*3/mm3      nRBC 0.0 /100 WBC     Urinalysis With Microscopic If Indicated (No Culture) - Urine, Clean Catch [[id number]] Collected: 06/25/24 0136    Specimen: Urine, Clean Catch Updated: 06/25/24 0142             Imaging:    CT Angiogram Head w AI Analysis of LVO    Result Date: 6/25/2024  CT ANGIOGRAM NECK, CT ANGIOGRAM HEAD W AI ANALYSIS OF LVO INDICATION: Stroke like symptoms. Slurred speech. TECHNIQUE: CT angiogram of the head and neck with IV contrast. 3-D reconstructions were obtained and reviewed.  Evaluation for a significant carotid arterial stenosis is based on the NASCET criteria. Radiation dose reduction techniques included automated exposure control or exposure modulation based on body size. AI analysis for LVO was utilized. COMPARISON: Head CT 6/24/2024 FINDINGS: CTA neck: The visualized upper lungs are clear. There is mild atherosclerotic  disease in the aortic arch. Great vessel origins are widely patent. The vertebral arteries appear widely patent. Left side is dominant. There is mild plaque disease in the common carotid arteries and bifurcations extending into both proximal internal carotid arteries. However, no measurable ICA stenosis is identified on either side of the neck. There is no carotid or vertebral arterial dissection. CTA head: The distal vertebral arteries are patent. The right distal vertebral artery is quite narrow in caliber. This appears to be congenital. The basilar artery is fairly diffusely narrow in caliber as well but is widely patent. The carotid siphons and distal internal carotid arteries are widely patent. There are bilateral fetal origin posterior cerebral arteries. There is a hypoplastic right A1 segment. The left RODDY is dominant. No flow-limiting stenosis or large vessel occlusion is identified. No  aneurysm. The major dural venous sinuses are patent. No pathologic enhancement in the brain.     1. Atherosclerotic disease in the carotid arteries in the neck, but no measurable carotid stenosis. No carotid or vertebral arterial dissection. 2. No intracranial flow limiting stenosis or large vessel occlusion. There is an anterior dominant circulation with bilateral fetal origin PCAs. No aneurysm. Electronically Signed: Dionte Francis MD  6/25/2024 1:06 AM EDT  Workstation ID: [id number]    CT Angiogram Neck    Result Date: 6/25/2024  CT ANGIOGRAM NECK, CT ANGIOGRAM HEAD W AI ANALYSIS OF LVO INDICATION: Stroke like symptoms. Slurred speech. TECHNIQUE: CT angiogram of the head and neck with IV contrast. 3-D reconstructions were obtained and reviewed.  Evaluation for a significant carotid arterial stenosis is based on the NASCET criteria. Radiation dose reduction techniques included automated exposure control or exposure modulation based on body size. AI analysis for LVO was utilized. COMPARISON: Head CT 6/24/2024 FINDINGS: CTA  neck: The visualized upper lungs are clear. There is mild atherosclerotic disease in the aortic arch. Great vessel origins are widely patent. The vertebral arteries appear widely patent. Left side is dominant. There is mild plaque disease in the common carotid arteries and bifurcations extending into both proximal internal carotid arteries. However, no measurable ICA stenosis is identified on either side of the neck. There is no carotid or vertebral arterial dissection. CTA head: The distal vertebral arteries are patent. The right distal vertebral artery is quite narrow in caliber. This appears to be congenital. The basilar artery is fairly diffusely narrow in caliber as well but is widely patent. The carotid siphons and distal internal carotid arteries are widely patent. There are bilateral fetal origin posterior cerebral arteries. There is a hypoplastic right A1 segment. The left RODDY is dominant. No flow-limiting stenosis or large vessel occlusion is identified. No  aneurysm. The major dural venous sinuses are patent. No pathologic enhancement in the brain.     1. Atherosclerotic disease in the carotid arteries in the neck, but no measurable carotid stenosis. No carotid or vertebral arterial dissection. 2. No intracranial flow limiting stenosis or large vessel occlusion. There is an anterior dominant circulation with bilateral fetal origin PCAs. No aneurysm. Electronically Signed: Dionte Francis MD  6/25/2024 1:06 AM EDT  Workstation ID: [id number]    XR Chest 1 View    Result Date: 6/25/2024  XR CHEST 1 VW Date of Exam: 6/25/2024 12:15 AM EDT Indication: Acute Stroke Protocol (Onset < 12 hrs) Comparison: 6/13/2019 Findings: Cardiac and mediastinal contours are normal. There is some mild scarring at the left base. Lungs are otherwise clear. Pulmonary vascularity is normal. No pneumothorax is seen.     No acute cardiopulmonary findings. Electronically Signed: Dionte Francis MD  6/25/2024 12:29 AM EDT  Workstation  ID: [id number]    CT CEREBRAL PERFUSION WITH & WITHOUT CONTRAST    Result Date: 6/25/2024  CT CEREBRAL PERFUSION W WO CONTRAST-  Date of Exam: 6/24/2024 11:20 PM  Indication: Neuro Deficit, acute, Stroke suspected.  Comparison: Head CT 6/24/2024  Technique: Axial CT images of the brain were obtained prior to and after the administration of contrast. Core blood volume, core blood flow, mean transit time, and Tmax images were obtained utilizing the Rapid software protocol. A limited CT angiogram of the head was also performed to measure the blood vessel density.  The radiation dose reduction device was turned on for each scan per the ALARA (As Low as Reasonably Achievable) protocol.  Findings: The examination is abnormal demonstrating 37 mL of ischemic at risk tissue in the posterior fossa and in the temporal lobes, right greater than left. No infarct core is identified.  Total CBF less than 30%: 0 mL. Total Tmax greater than 6 seconds: 37 mL. Mismatch volume: 37 mL. Mismatch ratio: Infinite.      Abnormal study demonstrating 37 mL of ischemic at risk tissue in the posterior fossa and temporal lobes, right greater than left. Consider MRI for follow-up.    Electronically Signed By-Dr. Dionte Francis MD On:6/25/2024 12:12 AM      CT Head Without Contrast Stroke Protocol    Result Date: 6/24/2024  CT HEAD WO CONTRAST STROKE PROTOCOL HISTORY: Acute neurologic deficit. Strokelike symptoms. TECHNIQUE: Axial unenhanced head CT with multiplanar reformats. Radiation dose reduction techniques included automated exposure control or exposure modulation based on body size. COMPARISON: None. FINDINGS: Ventricular size and configuration are normal. No acute infarct or hemorrhage is identified. There are no masses. There is no skull fracture. There is complete opacification of the frontal sinuses that could be chronic. There is partial opacification of bilateral ethmoid air cells.     Chronic changes in the paranasal sinuses. No acute  findings in the brain. Electronically Signed: Dionte Francis MD  6/24/2024 11:58 PM EDT  Workstation ID: [id number]       Differential Diagnosis and Discussion:    Stroke: Differential diagnosis in this patient with signs of possible ischemic stroke include TIA or ischemic stroke, hemorrhagic stroke, hypoglycemic episode, toxic or metabolic encephalopathy, paresthesias.    All labs were reviewed and interpreted by me.  All X-rays impressions were independently interpreted by me.  EKG was interpreted by me.    MDM  Number of Diagnoses or Management Options  Bilateral carotid artery stenosis  TIA (transient ischemic attack)  Diagnosis management comments: At the time arrival the patient had an NIH stroke scale of 0.    The patient's blood pressure was elevated at 162/90 otherwise the patient's vital signs are stable    The patient's EKG demonstrated a normal sinus rhythm.  The EKG demonstrated no evidence of dysrhythmia.  The patient had no acute ST abnormalities or acute T wave abnormalities to indicate STEMI or other acute cardiac pathology, injury or ischemia    The patient's CMP was reviewed and shows no abnormalities of critical concern.  Of note, the patient's sodium and potassium are acceptable.  The patient's liver enzymes are unremarkable.  The patient's renal function including creatinine is preserved.  The patient has a normal anion gap.    The patient's CBC was reviewed and shows no abnormalities of critical concern.  Of note, there is no anemia requiring a blood transfusion and the platelet count is acceptable    CT of the brain demonstrates chronic changes in the nasal sinuses.  There is no acute findings in the brain including obvious acute stroke hemorrhage or mass    CT angiogram of the head and neck demonstrated atherosclerosis of the carotid arteries but there is no evidence of LVO    CT perfusion demonstrated 37 mm of ischemic at risk tissue in the posterior fossa and temporal lobes right greater  than left, MRI is recommended    After dysphagia screen, the patient was given 75 mg of Plavix as recommended by the teleneurologist..  The patient did take a baby aspirin before arrival.  At the time of admission the patient continues to have an NIH stroke scale of 0    The patient will be admitted to the hospital for MRI and further workup and evaluation of the possible TIA       Amount and/or Complexity of Data Reviewed  Clinical lab tests: reviewed  Tests in the radiology section of CPT®: reviewed  Tests in the medicine section of CPT®: reviewed  Discuss the patient with other providers: yes (I discussed the case with Dr. Cuevas, teleneurologist.  Evaluated the patient immediately after arrival.  On examination he obtained the same NIH stroke scale myself which was 0.  He does feel that this is a possible TIA.  He is recommending that the patient be admitted to the hospital for MRI as well as echocardiogram and risk factor modification..  He is requesting the patient received 75 mg of Plavix after dysphagia screen has been performed    01:45 EDT  I discussed the case with the hospitalist.  We have discussed the patient's presenting symptoms, laboratory values, imaging and condition at the time of admission.  They will evaluate the patient in the emergency room and admit the patient to the hospital)    Critical Care  Total time providing critical care: 35 minutes (Total critical care time of.  Total critical care time documented does not include time spent on separately billed procedures for services of nurses or physician assistants.  I personally saw and examined the patient.  I have reviewed all diagnostic interpretations and treatment plans as written.  I was present for the key portions of any procedures performed and the inclusive time noted in any critical care statement.  Critical care time includes patient management by me, time spent at the patient bedside, time to review labs/ ABG's, imaging results,  discussing patient care, documentation in the medical record and time spent with family or caregiver)         Social Determinants of Health:    Patient is independent, reliable, and has access to care.       Disposition and Care Coordination:    Admit:   Through independent evaluation of the patient's history, physical, and imperical data, the patient meets criteria for inpatient admission to the hospital.        Final diagnoses:   TIA (transient ischemic attack)   Bilateral carotid artery stenosis        ED Disposition       ED Disposition   Decision to Admit    Condition   --    Comment   --               This medical record created using voice recognition software.             Clarence Donohue DO  06/25/24 0152

## 2024-06-25 NOTE — H&P
[facility]  Date: 2024   Patient Name: Paolo Goldstein  : 1960  MRN: [id number]  Primary Care Physician:  Stanford Howe MD  Date of admission: 2024    Subjective   Subjective     Chief Complaint: Slurred speech    HPI:    Paolo Goldstein is a 63 y.o. male past medical history of hypertension, A-fib who presents to the emergency department for evaluation of slurred speech that happened yesterday evening.  States he had 1 episode 1 week ago as well.  Describes it as approximately 2 minutes of tongue numbness and slurred speech that resolved on its own.  He had a second episode that also lasted about 2 minutes yesterday evening prompting him to seek further medical attention.  States currently he feels at baseline.  Denies any fevers, chills, sweats, nausea, vomiting, chest pain, shortness of breath, palpitations, abdominal pain diarrhea constipation, dysuria, weakness, rash.  In the emergency department code stroke was called and workup was unrevealing.  Evaluated by neurology who recommends admission for MRI and echocardiogram.      Personal History     Past Medical History:  Past Medical History:   Diagnosis Date    A-fib     Acid reflux     Arthritis     Carotid atherosclerosis, bilateral 2021    Colon polyps     Essential hypertension     Family history of colon cancer     Fatty liver     GERD (gastroesophageal reflux disease)     Hip pain     LEFT     Insomnia     MGUS (monoclonal gammopathy of unknown significance)     Osteomyelitis of ankle     patient reports right ankle    Pes cavus of left foot 2019    Primary osteoarthritis of left foot 2019    Sciatic nerve pain          Past Surgical History:  Past Surgical History:   Procedure Laterality Date    ANKLE SURGERY Right 1977    RIGHT ANKLE CLOSED REDUCTION     CARDIOVERSION      CHOLECYSTECTOMY      LAPAROSCOPIC    COLONOSCOPY  2017    DR. PICKARD    COLONOSCOPY N/A  8/19/2022    Procedure: COLONOSCOPY WITH POLYPECTOMY;  Surgeon: Janes Dodd MD;  Location: [facility] ENDOSCOPY;  Service: Gastroenterology;  Laterality: N/A;  COLON POLYP, DIVERTICULOSIS    ENDOSCOPY  08/09/2013        HIP ARTHROPLASTY Left     HIP BIPOLAR REPLACEMENT Left     OTHER SURGICAL HISTORY      ARTIFICAL JOINTS/LIMBS     OTHER SURGICAL HISTORY      JOINT SURGERY    OTHER SURGICAL HISTORY      I & D for osteomylitis right foot         Family History:   Family History   Problem Relation Age of Onset    Stroke Mother     Arthritis Mother     Thyroid disease Mother     Prostate cancer Father 69    Heart disease Father     Colon cancer Paternal Aunt     Colon cancer Paternal Uncle     Colon cancer Paternal Grandmother 60         Social History:   Social History     Tobacco Use    Smoking status: Never    Smokeless tobacco: Never   Vaping Use    Vaping status: Never Used   Substance Use Topics    Alcohol use: Yes     Alcohol/week: 3.0 standard drinks of alcohol     Types: 3 Cans of beer per week     Comment: occasionally    Drug use: Never         Home Medications:  Loratadine, Turmeric, allopurinol, aspirin, candesartan, cholecalciferol, coenzyme Q10, colestipol, ezetimibe, meloxicam, metoprolol succinate XL, pantoprazole, and rosuvastatin    Allergies:  No Known Allergies    Review of Systems   All systems were reviewed and negative except for: Slurred speech    Objective   Objective     Vitals:   Temp:  [97.5 °F (36.4 °C)-98 °F (36.7 °C)] 97.6 °F (36.4 °C)  Heart Rate:  [70-81] 70  Resp:  [16-20] 18  BP: (133-162)/(80-94) 142/94    Physical Exam    Constitutional: Awake, alert, no acute distress   Eyes: Pupils equal, sclerae anicteric, no conjunctival injection   HENT: NCAT, mucous membranes moist   Neck: Supple, no thyromegaly, no lymphadenopathy, trachea midline   Respiratory: Clear to auscultation bilaterally, nonlabored respirations    Cardiovascular: RRR, no murmurs, rubs, or gallops,  palpable pedal pulses bilaterally   Gastrointestinal: Positive bowel sounds, soft, nontender, nondistended   Musculoskeletal: No bilateral ankle edema, no clubbing or cyanosis to extremities   Psychiatric: Appropriate affect, cooperative   Neurologic: Oriented x 3, strength symmetric in all extremities, Cranial Nerves grossly intact to confrontation, speech clear   Skin: No rashes     Result Review    Result Review:  I have personally reviewed the results from the time of this admission to 6/25/2024 02:32 EDT and agree with these findings:  [x]  Laboratory  []  Microbiology  [x]  Radiology  []  EKG/Telemetry   []  Cardiology/Vascular   []  Pathology  []  Old records  []  Other:      Assessment & Plan   Assessment / Plan     Assessment/Plan:   TIA: Seen by neurology, appreciate recommendations.  Check MRI and echocardiogram.  Monitor on telemetry.  Frequent neurochecks.  PT/OT/SLP.  Aspirin, Plavix, statin  Hypertension: Permissive hypertension for now with goal systolic 1 60-1 80 per neurology recommendations.      VTE Prophylaxis:  Pharmacologic VTE prophylaxis orders are signed & held.          CODE STATUS:    Code Status (Patient has no pulse and is not breathing): CPR (Attempt to Resuscitate)  Medical Interventions (Patient has pulse or is breathing): Full Support      Admission Status:  I believe this patient meets observation status.    Electronically signed by Vipul Diallo Jr, MD, 06/25/24, 2:32 AM EDT.

## 2024-06-25 NOTE — DISCHARGE SUMMARY
[facility]         HOSPITALIST  DISCHARGE SUMMARY    Patient Name: Paolo Goldstein  : 1960  MRN: [id number]    Date of Admission: 2024  Date of Discharge:  2024    Primary Care Physician: Stanford Howe MD    Consults       Date and Time Order Name Status Description    2024  3:17 AM Inpatient Neurology Consult Stroke      2024  1:41 AM Inpatient Hospitalist Consult              Active and Resolved Hospital Problems:  Active Hospital Problems    Diagnosis POA    **TIA (transient ischemic attack) [G45.9] Yes    Paroxysmal atrial fibrillation [I48.0] Yes    Hypertension, essential [I10] Yes      Resolved Hospital Problems   No resolved problems to display.       Hospital Course     Hospital Course:  Paolo Goldstein is a 63 y.o. male with past medical history of hypertension, A-fib who presents to the emergency department for evaluation of slurred speech that happened yesterday evening.  States he had 1 episode 1 week ago as well.  Describes it as approximately 2 minutes of tongue numbness and slurred speech that resolved on its own.  He had a second episode that also lasted about 2 minutes yesterday evening prompting him to seek further medical attention.  States currently he feels at baseline.  Denies any fevers, chills, sweats, nausea, vomiting, chest pain, shortness of breath, palpitations, abdominal pain diarrhea constipation, dysuria, weakness, rash.  In the emergency department code stroke was called and workup was unrevealing.  Evaluated by neurology who recommends admission for MRI and echocardiogram.  Was admitted to the hospital patient had CTA of the head and neck which showed atherosclerotic disease in the carotid arteries in the neck but no measurable carotid stenosis.  No intracranial flow limiting stenosis or large vessel occlusion.  Patient also had an MRI of the brain which showed no acute infarction, no intracranial hemorrhage.  No acute  intracranial process identified.  Echocardiogram is currently pending.  At this time neurology recommends to switch patient from antiplatelet to Eliquis 5 mg twice daily given patient does have a history of atrial fibrillation although he states it has been quite sometime since he has been in atrial fibrillation.  Patient is agreeable to this plan.  Patient does follow with Dr. Lr and will discuss continuation of Eliquis with Dr. Lr.  Patient otherwise request to be discharged home today as he is feeling better.  Patient will discharge home today in stable condition with close outpatient follow-up with his primary care and specialist.  Patient should return back to the ER with any worsening symptoms.     DISCHARGE Follow Up Recommendations for labs and diagnostics:   Follow up with pcp in 1 week  Follow up with Dr. Lr in 1-2 weeks       Day of Discharge     Vital Signs:  Temp:  [97.2 °F (36.2 °C)-99.1 °F (37.3 °C)] 97.2 °F (36.2 °C)  Heart Rate:  [57-81] 62  Resp:  [16-20] 18  BP: (122-163)/() 124/86  Physical Exam:   Constitutional: Awake, alert, no acute distress. Resting in bed with wife at the bedside               Eyes: Pupils equal, sclerae anicteric, no conjunctival injection              HENT: NCAT, mucous membranes moist              Neck: Supple,  trachea midline              Respiratory: Clear to auscultation bilaterally, nonlabored respirations no wheezing noted               Cardiovascular: RRR, no murmurs, rubs, or gallops, palpable pedal pulses bilaterally              Gastrointestinal: Positive bowel sounds, soft, nontender, nondistended              Musculoskeletal: Full rom               Psychiatric: Appropriate affect, cooperative              Neurologic: Oriented x 3, strength symmetric in all extremities, Cranial Nerves grossly intact to confrontation, speech clear              Skin: No rashes       Discharge Details        Discharge Medications        New Medications         Instructions Start Date   apixaban 5 MG tablet tablet  Commonly known as: ELIQUIS   5 mg, Oral, 2 Times Daily             Continue These Medications        Instructions Start Date   allopurinol 300 MG tablet  Commonly known as: ZYLOPRIM   300 mg, Oral, Daily      aspirin 81 MG EC tablet   Aspir-81 81 mg oral tablet,delayed release (DR/EC) take 1 tablet (81 mg) by oral route once daily   Active      candesartan 32 MG tablet  Commonly known as: ATACAND   32 mg, Oral, Daily      cholecalciferol 25 MCG (1000 UT) tablet  Commonly known as: VITAMIN D3   1,000 Units, Oral, Daily      coenzyme Q10 100 MG capsule   100 mg, Oral, Daily      colestipol 5 g granules  Commonly known as: COLESTID   1 g, Oral      ezetimibe 10 MG tablet  Commonly known as: ZETIA   10 mg, Oral, Daily      Loratadine 10 MG capsule   As Needed      metoprolol succinate XL 50 MG 24 hr tablet  Commonly known as: TOPROL-XL   75 mg, Oral, Nightly      pantoprazole 40 MG EC tablet  Commonly known as: PROTONIX   40 mg, Oral, Daily      rosuvastatin 10 MG tablet  Commonly known as: CRESTOR   15 mg, Oral, Daily      Turmeric 1053 MG tablet   turmeric 400 mg oral capsule take 1 capsule by oral route daily   Active             Stop These Medications      meloxicam 15 MG tablet  Commonly known as: MOBIC              No Known Allergies    Discharge Disposition:  Home or Self Care    Diet:  Hospital:  Diet Order   Procedures    Diet: Regular/House; Fluid Consistency: Thin (IDDSI 0)       Discharge Activity: as tolerated       CODE STATUS:  Code Status and Medical Interventions:   Ordered at: 06/25/24 0155     Code Status (Patient has no pulse and is not breathing):    CPR (Attempt to Resuscitate)     Medical Interventions (Patient has pulse or is breathing):    Full Support         Future Appointments   Date Time Provider Department Center   9/10/2024  8:15 AM Carmen Crawford APRN [facility]   12/18/2024  9:15 AM Stanford Howe MD [facility] PC  [facility]       Additional Instructions for the Follow-ups that You Need to Schedule       Discharge Follow-up with Specified Provider: Dr. Lr; 1 Week   As directed      To: Dr. Lr   Follow Up: 1 Week                Pertinent  and/or Most Recent Results     PROCEDURES:   None    LAB RESULTS:      Lab 06/24/24  2352   WBC 8.12   HEMOGLOBIN 14.6   HEMATOCRIT 43.6   PLATELETS 178   NEUTROS ABS 4.74   IMMATURE GRANS (ABS) 0.03   LYMPHS ABS 1.92   MONOS ABS 0.86   EOS ABS 0.51*   MCV 94.6   PROTIME 14.4   APTT 29.5         Lab 06/25/24  0434 06/24/24  2352   SODIUM  --  137   POTASSIUM  --  3.9   CHLORIDE  --  101   CO2  --  26.9   ANION GAP  --  9.1   BUN  --  17   CREATININE  --  1.16   EGFR  --  70.8   GLUCOSE  --  104*   CALCIUM  --  8.7   HEMOGLOBIN A1C 5.70*  --          Lab 06/24/24  2352   TOTAL PROTEIN 6.2   ALBUMIN 3.6   GLOBULIN 2.6   ALT (SGPT) 27   AST (SGOT) 26   BILIRUBIN 0.3   ALK PHOS 82         Lab 06/24/24  2352   HSTROP T 22*   PROTIME 14.4   INR 1.10         Lab 06/25/24  0434   CHOLESTEROL 120   LDL CHOL 69   HDL CHOL 31*   TRIGLYCERIDES 106         Lab 06/25/24  0205 06/24/24  2352   ABO TYPING O O   RH TYPING Positive Positive   ANTIBODY SCREEN  --  Negative         Brief Urine Lab Results  (Last result in the past 365 days)        Color   Clarity   Blood   Leuk Est   Nitrite   Protein   CREAT   Urine HCG        06/25/24 0136 Yellow   Clear   Negative   Negative   Negative   Negative                 Microbiology Results (last 10 days)       ** No results found for the last 240 hours. **            MRI Brain Without Contrast    Result Date: 6/25/2024  Impression: 1.No acute intracranial process identified. 2.Moderate paranasal sinus mucosal disease. Electronically Signed: Kamlesh Zhang MD  6/25/2024 11:50 AM EDT  Workstation ID: [id number]    CT Angiogram Head w AI Analysis of LVO    Result Date: 6/25/2024  1. Atherosclerotic disease in the carotid arteries in the neck, but no measurable  carotid stenosis. No carotid or vertebral arterial dissection. 2. No intracranial flow limiting stenosis or large vessel occlusion. There is an anterior dominant circulation with bilateral fetal origin PCAs. No aneurysm. Electronically Signed: Dionte Francis MD  6/25/2024 1:06 AM EDT  Workstation ID: [id number]    CT Angiogram Neck    Result Date: 6/25/2024  1. Atherosclerotic disease in the carotid arteries in the neck, but no measurable carotid stenosis. No carotid or vertebral arterial dissection. 2. No intracranial flow limiting stenosis or large vessel occlusion. There is an anterior dominant circulation with bilateral fetal origin PCAs. No aneurysm. Electronically Signed: Dionte Francis MD  6/25/2024 1:06 AM EDT  Workstation ID: [id number]    XR Chest 1 View    Result Date: 6/25/2024  No acute cardiopulmonary findings. Electronically Signed: Dionte Francis MD  6/25/2024 12:29 AM EDT  Workstation ID: [id number]    CT CEREBRAL PERFUSION WITH & WITHOUT CONTRAST    Result Date: 6/25/2024  Abnormal study demonstrating 37 mL of ischemic at risk tissue in the posterior fossa and temporal lobes, right greater than left. Consider MRI for follow-up.    Electronically Signed By-Dr. Dionte Francis MD On:6/25/2024 12:12 AM      CT Head Without Contrast Stroke Protocol    Result Date: 6/24/2024  Chronic changes in the paranasal sinuses. No acute findings in the brain. Electronically Signed: Dionte Francis MD  6/24/2024 11:58 PM EDT  Workstation ID: [id number]                  Labs Pending at Discharge:        Time spent on Discharge including face to face service:  more than 35 minutes    Electronically signed by Melquiades Bergeron DO, 06/25/24, 2:21 PM EDT.

## 2024-06-25 NOTE — THERAPY EVALUATION
Acute Care - Speech Language Pathology   Swallow Initial Evaluation [facility]     Patient Name: Paolo Goldstein  : 1960  MRN: [id number]  Today's Date: 2024               Admit Date: 2024    Visit Dx:     ICD-10-CM ICD-9-CM   1. TIA (transient ischemic attack)  G45.9 435.9   2. Bilateral carotid artery stenosis  I65.23 433.10     433.30   3. Difficulty in walking  R26.2 719.7     Patient Active Problem List   Diagnosis    Paroxysmal atrial fibrillation    Functional diarrhea    Polycythemia    Stage 3a chronic kidney disease    Hypertension, essential    Mixed hyperlipidemia    Elevated liver enzymes    Monoclonal gammopathy of undetermined significance    Carotid atherosclerosis, bilateral    Osteomyelitis of ankle and foot    Family hx of colon cancer    Screening PSA (prostate specific antigen)    IFG (impaired fasting glucose)    JAMEE (obstructive sleep apnea)    Chronic refractory osteomyelitis of right foot    Colon polyps    GERD (gastroesophageal reflux disease)    Steatosis of liver    Osteoarthritis of left foot    Obesity (BMI 30-39.9)    Psychophysiological insomnia    Hypersomnia    Suspected sleep apnea    Poor sleep hygiene    Physical exam, annual    OA (osteoarthritis) of hip    TIA (transient ischemic attack)     Past Medical History:   Diagnosis Date    A-fib     Acid reflux     Arthritis     Carotid atherosclerosis, bilateral 2021    Colon polyps     Essential hypertension     Family history of colon cancer     Fatty liver     GERD (gastroesophageal reflux disease)     Hip pain     LEFT     Insomnia     MGUS (monoclonal gammopathy of unknown significance)     Osteomyelitis of ankle     patient reports right ankle    Pes cavus of left foot 2019    Primary osteoarthritis of left foot 2019    Sciatic nerve pain      Past Surgical History:   Procedure Laterality Date    ANKLE SURGERY Right 1977    RIGHT ANKLE CLOSED REDUCTION     CARDIOVERSION      CHOLECYSTECTOMY       LAPAROSCOPIC    COLONOSCOPY  04/21/2017    DR. PICKARD    COLONOSCOPY N/A 8/19/2022    Procedure: COLONOSCOPY WITH POLYPECTOMY;  Surgeon: Janes Pickard MD;  Location: [facility] ENDOSCOPY;  Service: Gastroenterology;  Laterality: N/A;  COLON POLYP, DIVERTICULOSIS    ENDOSCOPY  08/09/2013        HIP ARTHROPLASTY Left     HIP BIPOLAR REPLACEMENT Left     OTHER SURGICAL HISTORY      ARTIFICAL JOINTS/LIMBS     OTHER SURGICAL HISTORY      JOINT SURGERY    OTHER SURGICAL HISTORY      I & D for osteomylitis right foot   Inpatient Speech Pathology Dysphagia Evaluation     PAIN SCALE: None noted     PRECAUTIONS/CONTRAINDICATIONS: None noted     SUSPECTED ABUSE/NEGLECT/EXPLOITATION: None noted     SOCIAL/PSYCHOLOGICAL NEEDS/BARRIERS: None noted     PAST SOCIAL HISTORY: Lives at home     PRIOR FUNCTION: Independent     PATIENT GOALS/EXPECTATIONS: Did not report     HISTORY: This patient is a 63 year-old admitted with the above diagnosis.     CURRENT DIET LEVEL: Regular diet.     OBJECTIVE:    TEST ADMINISTERED: Clinical dysphagia evaluation     COGNITION/SAFETY AWARENESS: Alert     BEHAVIORAL OBSERVATIONS: Cooperative     ORAL MOTOR EXAM: Lingual and labial strength and range of motion within functional limits     VOICE QUALITY: Within normal limits     REFLEX EXAM: Strong cough     POSTURE:   90 degrees upright     FEEDING/SWALLOWING FUNCTION: Assessed with full range of consistencies with thin liquids from spoon cup and straw, purée consistency soft and hard solids     CLINICAL OBSERVATIONS: Oral stage is characterized by good bolus preparation and control.  Timely oral transit pharyngeal phase appears timely with good laryngeal elevation per palpation.  No clinical signs or symptoms of aspiration are noted.  Vocal quality remained clear to auscultation.  Denies globus sensation after completion of swallow     DYSPHAGIA CRITERIA: N/A     FUNCTIONAL ASSESSMENT INSTRUMENT: Patient currently scored a level 7 of  7 on Functional Communication Measures for swallowing indicating a 0% limitation in function.     ASSESSMENT/ PLAN OF CARE:  Pt presents with functional oropharyngeal swallow.  No clinical signs or symptoms of aspiration noted.  Vocal quality remained clear to auscultation.       REHAB POTENTIAL:  Pt has good rehab potential.  The following limitations may influence improvement/ length of tx medical status.     RECOMMENDATIONS:   1.   DIET: Regular diet-Thin liquids    2.  POSITION: 90 degrees upright     3.  COMPENSATORY STRATEGIES: General swallow precautions     Pt/responsible party agrees with plan of care and has been informed of all alternatives, risks and benefits.       EDUCATION  The patient has been educated in the following areas:   Dysphagia (Swallowing Impairment).          Allyson Brooke, SLP  6/25/2024

## 2024-06-25 NOTE — PLAN OF CARE
Goal Outcome Evaluation:  Plan of Care Reviewed With: patient        Progress: improving  Outcome Evaluation: Pt has rested this shift. MRI and echo completed today. No complaints of pain. plans to discharge home today

## 2024-06-25 NOTE — PLAN OF CARE
Goal Outcome Evaluation:  Plan of Care Reviewed With: patient        Progress: improving                                   No

## 2024-06-25 NOTE — PLAN OF CARE
Goal Outcome Evaluation:         Patient reports he is ad silvestre in room, is able to complete all ADLs without assist. Patient declines OT evaluation at this time.

## 2024-06-25 NOTE — PROGRESS NOTES
[facility]  [facility] Tele[facility]urology Consult Services    Routine Consult Follow-Up    Patient Name:   Paolo Goldstein  YOB: 1960  Identification Number:   MRN - [id number]  Date of Service:   06/25/2024 08:07:36    Diagnosis        G45.9 - Transient cerebral ischemic attack, unspecified    Impression  Pt is a 63 YOM with PMH of HTN, HLD, GERD, A.FIB, ARTHRITIS, polycythemia, monoclonal gammopathy of unknown significance who presented with complaint of slurred speech, head felt full.    His sensation of head fullness may be related to elevated blood pressures. This has resolved.    His transient speech disturbance is concerning for transischemic attack.    He has history of A-fib which may have been provoked in the setting of sepsis but he reports he subsequently underwent heart monitoring that showed a brief episode of atrial fibrillation. As result, I am concerned he may still have paroxysmal A-fib and that this may have led to his presentation. Recommend switching him to anticoagulation for primary stroke prevention.    Will hold off until MRI is complete to evaluate if he has had a small stroke not visualized on head CT as we would want to delay anticoagulation if that is the case.    Recommendations  Pending LDL and A1c  Pending brain MRI without contrast  Pending echocardiogram  After review of MRI, if no stroke is present recommend switching from antiplatelet to Eliquis 5 mg twice daily  If stroke is present we will want to hold off on anticoagulation for at least 4 days after symptom onset  Telemetry  PT, OT, speech therapy  Pressure goal of normotension    Our recommendations are outlined below    Diagnostic Studies :  MRI head without contrast    Laboratory Studies :  Lipid panelHemoglobin A1c    Antithrombotic Medication :  Initiate dual antiplatelet therapy with Aspirin 81 mg daily and Clopidogrel 75 mg dailyStatins for LDL goal less than 70    Nursing Recommendations :  Neuro  checks q4 hrs x 24 hrs and then per shiftContinue with Telemetry    Consultations :  Recommend Speech therapy if failed dysphagia screenPhysical therapy/Occupational therapy    DVT Prophylaxis :  Choice of Primary Team    Disposition :  Neurology will follow    Subjective  Patient was brought by EMS for symptoms of slurred speech, head felt full.  Pt is a 63 YOM with PMH of HTN, HLD, GERD, A.FIB, ARTHRITIS, monoclonal gammopathy of unknown significance who presented with complaint of slurred speech, head felt full. He was in a meeting around 2130 when event occurred. He stated it lasted a min or so and resolved. He had another event a week ago while on trip to [place].  He has polycythemia and gets therapeutic phlebotomies. HIs afib occurred in the setting of sepsis, he states that he had cardiac    Hospital Course  6/25- He has not had these symptoms have not returned. His BP was elevated on presentation, his head no longer feels full.    Imaging  CTP: Abnormal study demonstrating 37 mL of ischemic at risk tissue in the  posterior fossa and temporal lobes, right greater than left. Consider  MRI for follow-up.  cTA head and neck: 1. Atherosclerotic disease in the carotid arteries in the neck, but no measurable carotid stenosis. No carotid or vertebral arterial dissection.  2. No intracranial flow limiting stenosis or large vessel occlusion. There is an anterior dominant circulation with bilateral fetal origin PCAs. No aneurysm.    CT head:  IMPRESSION:  Chronic changes in the paranasal sinuses. No acute findings in the brain.       Labs  ldl 69       Examination  BP(126/83), Pulse(61), Temp(99.1), Resp(18),  1A: Level of Consciousness - Alert; keenly responsive + 0  1B: Ask Month and Age - Both Questions Right + 0  1C: Blink Eyes & Squeeze Hands - Performs Both Tasks + 0  2: Test Horizontal Extraocular Movements - Normal + 0  3: Test Visual Fields - No Visual Loss + 0  4: Test Facial Palsy (Use Grimace if Obtunded)  - Normal symmetry + 0  5A: Test Left Arm Motor Drift - No Drift for 10 Seconds + 0  5B: Test Right Arm Motor Drift - No Drift for 10 Seconds + 0  6A: Test Left Leg Motor Drift - No Drift for 5 Seconds + 0  6B: Test Right Leg Motor Drift - No Drift for 5 Seconds + 0  7: Test Limb Ataxia (FNF/Heel-Shin) - No Ataxia + 0  8: Test Sensation - Normal; No sensory loss + 0  9: Test Language/Aphasia - Normal; No aphasia + 0  10: Test Dysarthria - Normal + 0  11: Test Extinction/Inattention - No abnormality + 0    NIHSS Score: 0           This consult was conducted in real time using interactive audio and video technology. Patient was informed of the technology being used for this visit and agreed to proceed. Patient located in hospital and provider located at home/office setting.    Telehealth Neurology consultation was provided. I spent 25 minutes providing telehealth care. This includes time spent for face to face visit via telemedicine, review of medical records, imaging studies and discussion of findings with providers, the patient and/or family.      Dr Alina Tim      [facility]  For Inpatient follow-up with [facility] physician please call [facility] [phone]. This is not an outpatient service. Post hospital discharge, please contact hospital directly.    Please do not communicate with [facility] physicians via secure chat. If you have any questions, Please contact [facility].  Please call or reconsult our service if there are any clinical or diagnostic changes.

## 2024-06-25 NOTE — CONSULTS
[facility]  [facility] TeleNeurology Consult Services      Patient Name:   Paolo Goldstein  YOB: 1960  Identification Number:   MRN - [id number]  Date of Service:   06/24/2024 23:21:10    Diagnosis:        G45.9 - Transient cerebral ischemic attack, unspecified    Impression:   Pt is a 63 YOM with PMH of HTN, HLD, GERD, A.FIB, ARTHRITIS, monoclonal gammopathy of unknown significance who presented with complaint of slurred speech, head felt full. NIHSS: 0. Deferred thrombolytics. Prelim CTAs negative for LVO. Admit for TIA/STROKE vs TOXIC/METABOLIC/INFECTIOUS process.     Monitor neuro checks/VS q4h with telemetry.  Recommend fall precautions.  Goal SBP b/w 160-180 today, 140-160  -> 100-140 afterwards.   Start ASA + STATIN  if no contraindications. Add plavix 75 mg daily.  Get MRI BRAIN W/O and ECHO.  Get AMMONIA,  BLOOD CX x2, COVID, UDS, ETOH, ESR/CRP, TROP, CK, TSH, B12, BNP, LACTIC ACID, LIPID PANEL, and A1C.  Get WORKUP for TOXIC/METABOLIC/INFECTIOUS causes.  PT/OT/ST eval.      Our recommendations are outlined below.    Recommendations:          Stroke/Telemetry Floor        Neuro Checks        Bedside Swallow Eval        DVT Prophylaxis        IV Fluids, Normal Saline        Head of Bed 30 Degrees        Euglycemia and Avoid Hyperthermia (PRN Acetaminophen)    Recommended Scan:       MRI Head Without Contrast    Lipid Panel to Be Obtained, if Not Done in the Last 30 Days    Therapies:        Physical Therapy, Occupational Therapy, Speech Therapy Assessment When Applicable    Dysphagia:        Swallow Evaluation, Bedside        NPO Until Swallow Evaluation    DVT prophylaxis:        Choice of Primary Team        SCDs, Pneumatic Compression    Disposition:        Neurology Follow Up Recommended    Sign Out:        Discussed with Emergency Department Provider        ------------------------------------------------------------------------------    Advanced Imaging:  CTA Head and Neck  Completed.  CTP Completed.  LVO:No    Patient in not a candidate for KEEGAN      Metrics:  Last Known Well: 06/24/2024 21:30:00  [facility] Notification Time: 06/24/2024 23:20:41  Arrival Time: 06/24/2024 23:09:00  Stamp Time: 06/24/2024 23:21:10  Initial Response Time: 06/24/2024 23:21:47  Symptoms: slurred speech, head felt full.  Initial patient interaction: 06/24/2024 23:25:19  NIHSS Assessment Completed: 06/24/2024 23:40:11  Patient is not a candidate for Thrombolytic.  Thrombolytic Medical Decision: 06/24/2024 23:40:35  Patient was not deemed candidate for Thrombolytic because of following reasons:  No disabling symptoms.    CT head showed no acute hemorrhage or acute core infarct.  Rads Read: Chronic changes in the paranasal sinuses. No acute findings in the brain.     CTP: Abnormal study demonstrating 37 mL of ischemic at risk tissue in the posterior fossa and temporal lobes, right greater than left. Consider MRI for follow-up.     Primary Provider Notified of Diagnostic Impression and Management Plan on: 06/25/2024 00:50:46        ------------------------------------------------------------------------------    History of Present Illness:  Patient is a 63 year old Male.    Patient was brought by EMS for symptoms of slurred speech, head felt full.  Pt is a 63 YOM with PMH of HTN, HLD, GERD, A.FIB, ARTHRITIS, monoclonal gammopathy of unknown significance who presented with complaint of slurred speech, head felt full. He was in a meeting around 2130 when event occurred. He stated it lasted a min or so and resolved. He had another event a week ago while on trip to [place].      Past Medical History:       Hypertension       Hyperlipidemia       Atrial Fibrillation       There is no history of Stroke    Medications:    No Anticoagulant use   Antiplatelet use: Yes ASA 81  Reviewed EMR for current medications    Allergies:   Reviewed    Social History:  Smoking: No  Alcohol Use: No  Drug Use: No    Family  History:    There is no family history of premature cerebrovascular disease pertinent to this consultation    ROS :  14 Points Review of Systems was performed and was negative except mentioned in HPI.    Past Surgical History:  There Is No Surgical History Contributory To Today’s Visit        Examination:  BP(162/90), Pulse(81),  1A: Level of Consciousness - Alert; keenly responsive + 0  1B: Ask Month and Age - Both Questions Right + 0  1C: Blink Eyes & Squeeze Hands - Performs Both Tasks + 0  2: Test Horizontal Extraocular Movements - Normal + 0  3: Test Visual Fields - No Visual Loss + 0  4: Test Facial Palsy (Use Grimace if Obtunded) - Normal symmetry + 0  5A: Test Left Arm Motor Drift - No Drift for 10 Seconds + 0  5B: Test Right Arm Motor Drift - No Drift for 10 Seconds + 0  6A: Test Left Leg Motor Drift - No Drift for 5 Seconds + 0  6B: Test Right Leg Motor Drift - No Drift for 5 Seconds + 0  7: Test Limb Ataxia (FNF/Heel-Shin) - No Ataxia + 0  8: Test Sensation - Normal; No sensory loss + 0  9: Test Language/Aphasia - Normal; No aphasia + 0  10: Test Dysarthria - Normal + 0  11: Test Extinction/Inattention - No abnormality + 0    NIHSS Score: 0    Pre-Morbid Modified Bethel Scale: 0 Points = No symptoms at all    Spoke with : ED  I reviewed the available imaging via Rapid and initiated discussion with the primary provider    This consult was conducted in real time using interactive audio and video technology. Patient was informed of the technology being used for this visit and agreed to proceed. Patient located in hospital and provider located at home/office setting.      Patient is being evaluated for possible acute neurologic impairment and high probability of imminent or life-threatening deterioration. I spent total of 35 minutes providing care to this patient, including time for face to face visit via telemedicine, review of medical records, imaging studies and discussion of findings with providers, the  patient and/or family.      Dr Chan Cuevas      [facility]  For Inpatient follow-up with [facility] physician please call [facility] [phone]. This is not an outpatient service. Post hospital discharge, please contact hospital directly.    Please do not communicate with [facility] physicians via secure chat. If you have any questions, Please contact [facility].  Please call or reconsult our service if there are any clinical or diagnostic changes.

## 2024-06-25 NOTE — THERAPY EVALUATION
Acute Care - Physical Therapy Initial Evaluation   [facility]     Patient Name: Paolo Goldstein  : 1960  MRN: [id number]  Today's Date: 2024     Admit date: 2024     Referring Physician: Melquiades Bergeron DO     Surgery Date:* No surgery found *             Visit Dx:     ICD-10-CM ICD-9-CM   1. TIA (transient ischemic attack)  G45.9 435.9   2. Bilateral carotid artery stenosis  I65.23 433.10     433.30   3. Difficulty in walking  R26.2 719.7     Patient Active Problem List   Diagnosis    Paroxysmal atrial fibrillation    Functional diarrhea    Polycythemia    Stage 3a chronic kidney disease    Hypertension, essential    Mixed hyperlipidemia    Elevated liver enzymes    Monoclonal gammopathy of undetermined significance    Carotid atherosclerosis, bilateral    Osteomyelitis of ankle and foot    Family hx of colon cancer    Screening PSA (prostate specific antigen)    IFG (impaired fasting glucose)    JAMEE (obstructive sleep apnea)    Chronic refractory osteomyelitis of right foot    Colon polyps    GERD (gastroesophageal reflux disease)    Steatosis of liver    Osteoarthritis of left foot    Obesity (BMI 30-39.9)    Psychophysiological insomnia    Hypersomnia    Suspected sleep apnea    Poor sleep hygiene    Physical exam, annual    OA (osteoarthritis) of hip    TIA (transient ischemic attack)     Past Medical History:   Diagnosis Date    A-fib     Acid reflux     Arthritis     Carotid atherosclerosis, bilateral 2021    Colon polyps     Essential hypertension     Family history of colon cancer     Fatty liver     GERD (gastroesophageal reflux disease)     Hip pain     LEFT     Insomnia     MGUS (monoclonal gammopathy of unknown significance)     Osteomyelitis of ankle     patient reports right ankle    Pes cavus of left foot 2019    Primary osteoarthritis of left foot 2019    Sciatic nerve pain      Past Surgical History:   Procedure Laterality Date    ANKLE SURGERY Right 1977    RIGHT  ANKLE CLOSED REDUCTION     CARDIOVERSION      CHOLECYSTECTOMY      LAPAROSCOPIC    COLONOSCOPY  04/21/2017    DR. PICKARD    COLONOSCOPY N/A 8/19/2022    Procedure: COLONOSCOPY WITH POLYPECTOMY;  Surgeon: Janes Pickard MD;  Location: [facility] ENDOSCOPY;  Service: Gastroenterology;  Laterality: N/A;  COLON POLYP, DIVERTICULOSIS    ENDOSCOPY  08/09/2013        HIP ARTHROPLASTY Left     HIP BIPOLAR REPLACEMENT Left     OTHER SURGICAL HISTORY      ARTIFICAL JOINTS/LIMBS     OTHER SURGICAL HISTORY      JOINT SURGERY    OTHER SURGICAL HISTORY      I & D for osteomylitis right foot     PT Assessment (Last 12 Hours)       PT Evaluation and Treatment       Row Name 06/25/24 0900          Physical Therapy Time and Intention    Subjective Information no complaints  -RENÉE     Document Type evaluation  -RENÉE     Mode of Treatment individual therapy;physical therapy  -RENÉE     Patient Effort good  -RENÉE       Row Name 06/25/24 0900          General Information    Patient Observations alert;cooperative;agree to therapy  -RENÉE     Prior Level of Function independent:;all household mobility;community mobility  -RENÉE     Existing Precautions/Restrictions no known precautions/restrictions  -RENÉE     Barriers to Rehab none identified  -RENÉE       Row Name 06/25/24 0900          Living Environment    Current Living Arrangements home  -RENÉE       Row Name 06/25/24 0900          Cognition    Orientation Status (Cognition) oriented x 3  -RENÉE       Row Name 06/25/24 0900          Range of Motion (ROM)    Range of Motion ROM is WFL  -RENÉE       Row Name 06/25/24 0900          Strength (Manual Muscle Testing)    Strength (Manual Muscle Testing) strength is WFL  -RENÉE       Row Name 06/25/24 0900          Bed Mobility    Bed Mobility bed mobility (all) activities;supine-sit  -RENÉE     All Activities, [place] (Bed Mobility) independent  -RENÉE     Supine-Sit [place] (Bed Mobility) independent  -RENÉE       Row Name 06/25/24 0900          Transfers     Transfers sit-stand transfer  -RENÉE       Row Name 06/25/24 0900          Sit-Stand Transfer    Sit-Stand Mason (Transfers) independent  -RENÉE       Row Name 06/25/24 0900          Gait/Stairs (Locomotion)    Gait/Stairs Locomotion gait/ambulation independence  -RENÉE     Mason Level (Gait) independent  -RENÉE     Distance in Feet (Gait) 50  -RENÉE       Anjum Name 06/25/24 0900          Balance    Balance Assessment standing dynamic balance  -RENÉE     Dynamic Standing Balance independent  -RENÉE       Row Name 06/25/24 0900          Plan of Care Review    Plan of Care Reviewed With patient  -RENÉE     Outcome Evaluation Pt did not demonstrate any safety or mobility deficits during the initial evaluation.  Pt is safe to continue ambulating within their room independently until their discharge from the hospital.  Pt will be discharged from PT services at this time.  -RENÉE       Row Name 06/25/24 0900          Therapy Assessment/Plan (PT)    Criteria for Skilled Interventions Met (PT) no problems identified which require skilled intervention  -RENÉE     Therapy Frequency (PT) evaluation only  -RENÉE       Row Name 06/25/24 0900          PT Evaluation Complexity    History, PT Evaluation Complexity no personal factors and/or comorbidities  -RENÉE     Examination of Body Systems (PT Eval Complexity) total of 4 or more elements  -RENÉE     Clinical Presentation (PT Evaluation Complexity) stable  -RENÉE     Clinical Decision Making (PT Evaluation Complexity) low complexity  -RENÉE     Overall Complexity (PT Evaluation Complexity) low complexity  -RENÉE       Row Name 06/25/24 0900          Therapy Plan Review/Discharge Plan (PT)    Therapy Plan Review (PT) evaluation/treatment results reviewed;participants voiced agreement with care plan;participants included;patient  -RENÉE       Row Name 06/25/24 0900          Physical Therapy Goals    Problem Specific Goal Selection (PT) problem specific goal 1, PT  -RENÉE       [facility] 06/25/24 0900          Problem  Specific Goal 1 (PT)    Problem Specific Goal 1 (PT) Complete PT evaluation  -RENÉE     Time Frame (Problem Specific Goal 1, PT) 1 day  -RENÉE     Progress/Outcome (Problem Specific Goal 1, PT) goal met  -RENÉE               User Key  (r) = Recorded By, (t) = Taken By, (c) = Cosigned By      Initials Name Provider Type    Jose Yates PT Physical Therapist                      PT Recommendation and Plan  Anticipated Discharge Disposition (PT): home  Therapy Frequency (PT): evaluation only  Plan of Care Reviewed With: patient  Outcome Evaluation: Pt did not demonstrate any safety or mobility deficits during the initial evaluation.  Pt is safe to continue ambulating within their room independently until their discharge from the hospital.  Pt will be discharged from PT services at this time.       Time Calculation:    PT Charges       Row Name 06/25/24 0927             Time Calculation    PT Received On 06/25/24  -RENÉE         Untimed Charges    PT Eval/Re-eval Minutes 20  -RENÉE         Total Minutes    Untimed Charges Total Minutes 20  -RENÉE       Total Minutes 20  -RENÉE                User Key  (r) = Recorded By, (t) = Taken By, (c) = Cosigned By      Initials Name Provider Type    Jose Yates PT Physical Therapist                      PT G-Codes  AM-PAC 6 Clicks Score (PT): 24    Jose Fung PT  6/25/2024

## 2024-06-25 NOTE — OUTREACH NOTE
Prep Survey      Flowsheet Row Responses   [facility] facility patient discharged from? [facility]   Is LACE score < 7 ? Yes   Eligibility [facility]   Date of Admission 06/24/24   Date of Discharge 06/25/24   Discharge Disposition Home or Self Care   Discharge diagnosis TIA   Does the patient have one of the following disease processes/diagnoses(primary or secondary)? Stroke   Does the patient have Home health ordered? No   Is there a DME ordered? No   Prep survey completed? Yes            KENROY HOLMAN - Registered Nurse

## 2024-06-25 NOTE — PLAN OF CARE
Goal Outcome Evaluation:      FUNCTIONAL ASSESSMENT INSTRUMENT: Patient currently scored a level 7 of 7 on Functional Communication Measures for swallowing indicating a 0% limitation in function.     ASSESSMENT/ PLAN OF CARE:  Pt presents with functional oropharyngeal swallow.  No clinical signs or symptoms of aspiration noted.  Vocal quality remained clear to auscultation.       REHAB POTENTIAL:  Pt has good rehab potential.  The following limitations may influence improvement/ length of tx medical status.     RECOMMENDATIONS:   1.   DIET: Regular diet-Thin liquids    2.  POSITION: 90 degrees upright     3.  COMPENSATORY STRATEGIES: General swallow precautions     Pt/responsible party agrees with plan of care and has been informed of all alternatives, risks and benefits.       EDUCATION  The patient has been educated in the following areas:   Dysphagia (Swallowing Impairment).

## 2024-06-25 NOTE — TELEPHONE ENCOUNTER
Caller: JESSIE LYNCH    Relationship to patient:     Best call back number: [phone] PATIENT'S NUMBER     Chief complaint: TIA     Type of visit: HOSPITAL FOLLOW UP     Requested date: 1 WEEK     If rescheduling, when is the original appointment:      Additional notes:

## 2024-06-26 ENCOUNTER — TRANSITIONAL CARE MANAGEMENT TELEPHONE ENCOUNTER (OUTPATIENT)
Dept: CALL CENTER | Facility: HOSPITAL | Age: 64
End: 2024-06-26
Payer: COMMERCIAL

## 2024-06-26 NOTE — OUTREACH NOTE
Call Center TCM Note      Flowsheet Row Responses   [facility] [place] patient discharged from? [facility]   Does the patient have one of the following disease processes/diagnoses(primary or secondary)? Stroke   TCM attempt successful? Yes   Call start time 1151   Call end time 1152   Discharge diagnosis TIA   Meds reviewed with patient/caregiver? Yes   Is the patient having any side effects they believe may be caused by any medication additions or changes? No   Does the patient have all medications ordered at discharge? Yes   Is the patient taking all medications as directed (includes completed medication regime)? Yes   Comments Hosp dc [facility] apt on 7/3/24 with PCP   Does the patient have an appointment with their PCP within 7-14 days of discharge? Yes   Has home health visited the patient within 72 hours of discharge? N/A   Psychosocial issues? No   Does the patient require any assistance with activities of daily living such as eating, bathing, dressing, walking, etc.? No   Does the patient have any residual symptoms from stroke/TIA? No   Does the patient understand the diet ordered at discharge? Yes   Did the patient receive a copy of their discharge instructions? Yes   Nursing interventions Reviewed instructions with patient   What is the patient's perception of their health status since discharge? Improving   Nursing interventions Nurse provided patient education   Is the patient/caregiver able to teach back the risk factors for a stroke? History of TIAs, High blood pressure-goal below 120/80   Is the patient/caregiver able to teach back signs and symptoms related to disease process for when to call PCP? Yes   Is the patient/caregiver able to teach back signs and symptoms related to disease process for when to call 911? Yes   If the patient is a current smoker, are they able to teach back resources for cessation? Not a smoker   Is the patient/caregiver able to teach back the hierarchy of who to call/visit for  symptoms/problems? PCP, Specialist, Home health nurse, Urgent Care, ED, 911 Yes   Is the patient able to teach back FAST for Stroke? B alance: Watch for sudden loss of balance, F ace: Look for an uneven smile, A rm: Check if one arm is weak, E yes: Check for vision loss, S peech: Listen for slurred speech, T malik: Call 9-1-1 right away   TCM call completed? Yes   Wrap up additional comments Pt improving-pt was golfing during the call   Call end time 1152            Zoe Mtz, RN    6/26/2024, 11:53 EDT

## 2024-07-03 ENCOUNTER — OFFICE VISIT (OUTPATIENT)
Dept: INTERNAL MEDICINE | Age: 64
End: 2024-07-03
Payer: COMMERCIAL

## 2024-07-03 VITALS
DIASTOLIC BLOOD PRESSURE: 75 MMHG | HEIGHT: 67 IN | SYSTOLIC BLOOD PRESSURE: 120 MMHG | OXYGEN SATURATION: 97 % | WEIGHT: 249 LBS | HEART RATE: 67 BPM | BODY MASS INDEX: 39.08 KG/M2 | TEMPERATURE: 98.2 F

## 2024-07-03 DIAGNOSIS — D75.1 POLYCYTHEMIA: ICD-10-CM

## 2024-07-03 DIAGNOSIS — E78.2 MIXED HYPERLIPIDEMIA: ICD-10-CM

## 2024-07-03 DIAGNOSIS — R73.01 IFG (IMPAIRED FASTING GLUCOSE): ICD-10-CM

## 2024-07-03 DIAGNOSIS — G45.9 TIA (TRANSIENT ISCHEMIC ATTACK): Primary | ICD-10-CM

## 2024-07-03 DIAGNOSIS — I10 HYPERTENSION, ESSENTIAL: ICD-10-CM

## 2024-07-03 DIAGNOSIS — E66.9 OBESITY (BMI 30-39.9): ICD-10-CM

## 2024-07-03 PROCEDURE — 99214 OFFICE O/P EST MOD 30 MIN: CPT | Performed by: INTERNAL MEDICINE

## 2024-07-03 RX ORDER — SODIUM CHLORIDE 9 MG/ML
250 INJECTION, SOLUTION INTRAVENOUS ONCE
OUTPATIENT
Start: 2024-09-01

## 2024-07-03 NOTE — PROGRESS NOTES
"CHIEF COMPLAINT/ HPI:  Hospital Follow Up Visit ([facility] 6/24-6/25 TIA. Pt is currently on Eliquis and did have a episode last night slurring for 2-3 minutes. )    Tia sxs,  tongue thick , slurred speech on and off , goes away in 3 min.            Objective   Vital Signs  Vitals:    07/03/24 1000   BP: 120/75   Pulse: 67   Temp: 98.2 °F (36.8 °C)   SpO2: 97%   Weight: 113 kg (249 lb)   Height: 170.2 cm (67.01\")      Body mass index is 38.99 kg/m².  Review of Systems episodes of slurred speech, thickened tongue, difficulty sucking through a straw over the past several weeks, comes and goes quickly within 3 to 4 minutes,  Physical Exam  Constitutional:       General: He is not in acute distress.     Appearance: Normal appearance. He is obese.   HENT:      Head: Normocephalic.      Mouth/Throat:      Mouth: Mucous membranes are moist.   Eyes:      Conjunctiva/sclera: Conjunctivae normal.      Pupils: Pupils are equal, round, and reactive to light.   Cardiovascular:      Rate and Rhythm: Normal rate and regular rhythm.      Pulses: Normal pulses.      Heart sounds: Normal heart sounds.   Pulmonary:      Effort: Pulmonary effort is normal.      Breath sounds: Normal breath sounds.   Abdominal:      General: Bowel sounds are normal.      Palpations: Abdomen is soft.   Musculoskeletal:         General: No swelling. Normal range of motion.      Cervical back: Neck supple.   Skin:     General: Skin is warm and dry.      Coloration: Skin is not jaundiced.   Neurological:      General: No focal deficit present.      Mental Status: He is alert and oriented to person, place, and time. Mental status is at baseline.   Psychiatric:         Mood and Affect: Mood normal.         Behavior: Behavior normal.         Thought Content: Thought content normal.         Judgment: Judgment normal.        Result Review :   No results found for: \"PROBNP\", \"BNP\"  CMP          3/20/2024    12:44 6/10/2024    09:01 6/24/2024    23:52   CMP   Glucose  " 101  104    BUN  14  17    Creatinine  1.13  1.16    EGFR  73.0  70.8    Sodium  139  137    Potassium  4.5  3.9    Chloride  108  101    Calcium  9.2  8.7    Total Protein 6.5     6.9  6.2    Albumin  3.9  3.6    Globulin  3.0  2.6    Total Bilirubin  0.3  0.3    Alkaline Phosphatase  95  82    AST (SGOT)  20  26    ALT (SGPT)  20  27    Albumin/Globulin Ratio  1.3  1.4    BUN/Creatinine Ratio  12.4  14.7    Anion Gap  9.4  9.1       Details          This result is from an external source.             CBC w/diff          3/1/2024    07:22 6/10/2024    09:01 6/24/2024    23:52   CBC w/Diff   WBC  6.31  8.12    RBC  4.93  4.61    Hemoglobin 17.0  15.9  14.6    Hematocrit 49.8  46.8  43.6    MCV  94.9  94.6    MCH  32.3  31.7    MCHC  34.0  33.5    RDW  12.6  13.2    Platelets  180  178    Neutrophil Rel %  54.9  58.4    Immature Granulocyte Rel %  0.5  0.4    Lymphocyte Rel %  28.8  23.6    Monocyte Rel %  7.1  10.6    Eosinophil Rel %  8.2  6.3    Basophil Rel %  0.5  0.7       Lipid Panel          3/1/2024    07:22 6/10/2024    09:01 6/25/2024    04:34   Lipid Panel   Total Cholesterol 110  106  120    Triglycerides 98  89  106    HDL Cholesterol 29  33  31    VLDL Cholesterol 19  18  20    LDL Cholesterol  62  55  69    LDL/HDL Ratio 2.12  1.67  2.19       Lab Results   Component Value Date    TSH 1.750 12/07/2021    TSH 2.780 09/13/2021    TSH 2.070 08/02/2019      Lab Results   Component Value Date    FREET4 1.09 12/07/2021    FREET4 0.9 05/04/2019      A1C Last 3 Results          12/8/2023    07:39 6/10/2024    09:01 6/25/2024    04:34   HGBA1C Last 3 Results   Hemoglobin A1C 5.70  5.60  5.70       PSA          12/8/2023    07:39   PSA   PSA 0.862                     Visit Diagnoses:    ICD-10-CM ICD-9-CM   1. TIA (transient ischemic attack)  G45.9 435.9   2. Hypertension, essential  I10 401.9   3. Mixed hyperlipidemia  E78.2 272.2   4. Obesity (BMI 30-39.9)  E66.9 278.00   5. IFG (impaired fasting glucose)   R73.01 790.21   6. Polycythemia  D75.1 238.4       Assessment and Plan   Diagnoses and all orders for this visit:    1. TIA (transient ischemic attack) (Primary)  -     Ambulatory Referral to Neurology  -     Ambulatory Referral to [facility] For Infusion Treatment  -     CBC & Differential; Future  -     Comprehensive Metabolic Panel; Future  -     Lipid Panel; Future  -     Sedimentation Rate; Future    2. Hypertension, essential  -     Ambulatory Referral to Neurology  -     Ambulatory Referral to [facility] For Infusion Treatment  -     CBC & Differential; Future  -     Comprehensive Metabolic Panel; Future  -     Lipid Panel; Future  -     Sedimentation Rate; Future    3. Mixed hyperlipidemia  -     Ambulatory Referral to Neurology  -     Ambulatory Referral to [facility] For Infusion Treatment  -     CBC & Differential; Future  -     Comprehensive Metabolic Panel; Future  -     Lipid Panel; Future  -     Sedimentation Rate; Future    4. Obesity (BMI 30-39.9)  -     Ambulatory Referral to Neurology  -     Ambulatory Referral to [facility] For Infusion Treatment  -     CBC & Differential; Future  -     Comprehensive Metabolic Panel; Future  -     Lipid Panel; Future  -     Sedimentation Rate; Future    5. IFG (impaired fasting glucose)  -     Ambulatory Referral to Neurology  -     Ambulatory Referral to [facility] For Infusion Treatment  -     CBC & Differential; Future  -     Comprehensive Metabolic Panel; Future  -     Lipid Panel; Future  -     Sedimentation Rate; Future    6. Polycythemia  -     Ambulatory Referral to Neurology  -     Ambulatory Referral to [facility] For Infusion Treatment  -     CBC & Differential; Future  -     Comprehensive Metabolic Panel; Future  -     Lipid Panel; Future  -     Sedimentation Rate; Future    Other orders  -     sodium chloride 0.9 % infusion 250 mL        TIA symptoms, slurred speech thickened tongue, June July 2024, workup at the hospital--- CT angiogram of the head showed atherosclerotic disease of the  carotid arteries but no measurable stenosis no vertebral dissection or carotid dissection no flow-limiting stenosis, no aneurysm, ----MRI of the brain shows no acute brain abnormality moderate paranasal sinus disease,, chest x-ray no active disease CT perfusion scan showed abnormal study demonstrating 37 mL area of ischemia at risk tissue in the posterior fossa and temporal lobes right greater than left, labs showed hemoglobin A1c of 5.7 cholesterol 120 LDL of 69, echocardiogram, June 2024 shows normal ejection fraction mild septal asymmetric hypertrophy grade 1 diastolic dysfunction moderate left atrial enlargement bubble study was negative no significant valve disease--- would continue Eliquis, and low-dose aspirin, consider getting a 30-day event monitor to rule out underlying paroxysmal atrial fibrillation and needs to go to a neurostroke center for second opinion Dr. Leidy herrera  at [facility] [place],  Phlebotomy plan was also put in today July 3, 2024 with change for frequency to every 3 months for hematocrit greater than or equal to 40 and holding for less than 39                     Follow Up   Return in about 1 month (around 8/3/2024).  Patient was given instructions and counseling regarding his condition or for health maintenance advice. Please see specific information pulled into the AVS if appropriate.

## 2024-07-05 ENCOUNTER — TELEPHONE (OUTPATIENT)
Dept: INTERNAL MEDICINE | Age: 64
End: 2024-07-05

## 2024-07-05 NOTE — TELEPHONE ENCOUNTER
Caller: Paolo Goldstein    Relationship: Self    Best call back number: [phone]    What was the call regarding: PATIENT HAS NOT HEARD ANYTHING ABOUT REFERRAL TO NEUROLOGY, BUT HE WOULD LIKE TO GO TO NEUROLOGY AT [facility] IF POSSIBLE.

## 2024-07-07 LAB
QT INTERVAL: 437 MS
QTC INTERVAL: 460 MS

## 2024-07-10 ENCOUNTER — OFFICE VISIT (OUTPATIENT)
Dept: CARDIOLOGY | Facility: CLINIC | Age: 64
End: 2024-07-10
Payer: COMMERCIAL

## 2024-07-10 VITALS
HEIGHT: 67 IN | SYSTOLIC BLOOD PRESSURE: 119 MMHG | WEIGHT: 242 LBS | HEART RATE: 63 BPM | DIASTOLIC BLOOD PRESSURE: 73 MMHG | BODY MASS INDEX: 37.98 KG/M2

## 2024-07-10 DIAGNOSIS — G45.9 TIA (TRANSIENT ISCHEMIC ATTACK): Primary | ICD-10-CM

## 2024-07-10 DIAGNOSIS — I48.0 PAROXYSMAL ATRIAL FIBRILLATION: ICD-10-CM

## 2024-07-10 PROBLEM — R29.818 SUSPECTED SLEEP APNEA: Status: RESOLVED | Noted: 2023-10-04 | Resolved: 2024-07-10

## 2024-07-10 PROBLEM — Z00.00 PHYSICAL EXAM, ANNUAL: Status: RESOLVED | Noted: 2023-12-13 | Resolved: 2024-07-10

## 2024-07-10 RX ORDER — MELATONIN
1000 DAILY
COMMUNITY

## 2024-07-10 NOTE — PROGRESS NOTES
Chief Complaint  Follow-up    Subjective            History of Present Illness  Paolo Goldstein is a 63-year-old male patient who presents to the office today for hospital follow-up.  He was admitted to [facility] from 6/24/2024 to 6/25/2024 for TIA.  CTA of the head and neck showed atherosclerotic disease in the carotid arteries but no measurable carotid stenosis.  MRI of the brain showed no acute findings.  Echocardiogram showed normal left ventricular systolic function and no evidence of thrombus.  He was switched from antiplatelets to Eliquis given his history of atrial fibrillation.  He was instructed to follow-up with cardiology outpatient.  Today he reports compliance with medication and denies any new or worsening cardiac symptoms today.    PMH  Past Medical History:   Diagnosis Date    A-fib     Acid reflux     Arthritis     Carotid atherosclerosis, bilateral 12/09/2021    Colon polyps     Essential hypertension     Family history of colon cancer     Fatty liver     GERD (gastroesophageal reflux disease)     Hip pain     LEFT     Insomnia     MGUS (monoclonal gammopathy of unknown significance)     Osteomyelitis of ankle     patient reports right ankle    Pes cavus of left foot 01/12/2019    Primary osteoarthritis of left foot 01/12/2019    Sciatic nerve pain          ALLERGY  No Known Allergies       SURGICALHX  Past Surgical History:   Procedure Laterality Date    ANKLE SURGERY Right 1977    RIGHT ANKLE CLOSED REDUCTION     CARDIOVERSION      CHOLECYSTECTOMY      LAPAROSCOPIC    COLONOSCOPY  04/21/2017    DR. PICKARD    COLONOSCOPY N/A 8/19/2022    Procedure: COLONOSCOPY WITH POLYPECTOMY;  Surgeon: Janes Pickard MD;  Location: [facility] ENDOSCOPY;  Service: Gastroenterology;  Laterality: N/A;  COLON POLYP, DIVERTICULOSIS    ENDOSCOPY  08/09/2013        HIP ARTHROPLASTY Left     HIP BIPOLAR REPLACEMENT Left     OTHER SURGICAL HISTORY      ARTIFICAL JOINTS/LIMBS     OTHER SURGICAL  HISTORY      JOINT SURGERY    OTHER SURGICAL HISTORY      I & D for osteomylitis right foot          SOC  Social History     Socioeconomic History    Marital status:    Tobacco Use    Smoking status: Never    Smokeless tobacco: Never   Vaping Use    Vaping status: Never Used   Substance and Sexual Activity    Alcohol use: Yes     Alcohol/week: 3.0 standard drinks of alcohol     Types: 3 Cans of beer per week     Comment: occasionally    Drug use: Never    Sexual activity: Yes     Partners: Female     Birth control/protection: None         FAMHX  Family History   Problem Relation Age of Onset    Stroke Mother     Arthritis Mother     Thyroid disease Mother     Prostate cancer Father 69    Heart disease Father     Colon cancer Paternal Aunt     Colon cancer Paternal Uncle     Colon cancer Paternal Grandmother 60          MEDSIGONLY  Current Outpatient Medications on File Prior to Visit   Medication Sig    allopurinol (ZYLOPRIM) 300 MG tablet TAKE 1 TABLET BY MOUTH DAILY    apixaban (ELIQUIS) 5 MG tablet tablet Take 1 tablet by mouth 2 (Two) Times a Day for 30 days.    aspirin 81 MG EC tablet Aspir-81 81 mg oral tablet,delayed release (DR/EC) take 1 tablet (81 mg) by oral route once daily   Active    candesartan (ATACAND) 32 MG tablet Take 1 tablet by mouth Daily.    Cholecalciferol 25 MCG (1000 UT) tablet Take 1 tablet by mouth Daily.    coenzyme Q10 100 MG capsule Take 1 capsule by mouth Daily.    colestipol (COLESTID) 5 g granules Take 1 g by mouth.    ezetimibe (ZETIA) 10 MG tablet Take 1 tablet by mouth Daily.    Loratadine 10 MG capsule As Needed.    metoprolol succinate XL (TOPROL-XL) 50 MG 24 hr tablet Take 1.5 tablets by mouth Every Night.    pantoprazole (PROTONIX) 40 MG EC tablet Take 1 tablet by mouth Daily.    rosuvastatin (CRESTOR) 10 MG tablet Take 1.5 tablets by mouth Daily.     No current facility-administered medications on file prior to visit.         Objective   /73   Pulse 63   Ht  "170.2 cm (67.01\")   Wt 110 kg (242 lb)   BMI 37.89 kg/m²       Physical Exam  Constitutional:       Appearance: He is obese.   HENT:      Head: Normocephalic.   Neck:      Vascular: No carotid bruit.   Cardiovascular:      Rate and Rhythm: Normal rate and regular rhythm.      Pulses: Normal pulses.      Heart sounds: Normal heart sounds. No murmur heard.  Pulmonary:      Effort: Pulmonary effort is normal.      Breath sounds: Normal breath sounds.   Musculoskeletal:      Cervical back: Neck supple.      Right lower leg: No edema.      Left lower leg: No edema.   Skin:     General: Skin is dry.   Neurological:      Mental Status: He is alert and oriented to person, place, and time.   Psychiatric:         Behavior: Behavior normal.       Result Review :   The following data was reviewed by: LON Storey on 07/10/2024:  No results found for: \"PROBNP\"      6/24/2024    23:52   CMP   Glucose 104    BUN 17    Creatinine 1.16    EGFR 70.8    Sodium 137    Potassium 3.9    Chloride 101    Calcium 8.7    Total Protein 6.2    Albumin 3.6    Globulin 2.6    Total Bilirubin 0.3    Alkaline Phosphatase 82    AST (SGOT) 26    ALT (SGPT) 27    Albumin/Globulin Ratio 1.4    BUN/Creatinine Ratio 14.7    Anion Gap 9.1          6/24/2024    23:52   CBC w/Diff   WBC 8.12    RBC 4.61    Hemoglobin 14.6    Hematocrit 43.6    MCV 94.6    MCH 31.7    MCHC 33.5    RDW 13.2    Platelets 178    Neutrophil Rel % 58.4    Immature Granulocyte Rel % 0.4    Lymphocyte Rel % 23.6    Monocyte Rel % 10.6    Eosinophil Rel % 6.3    Basophil Rel % 0.7       Lab Results   Component Value Date    TSH 1.750 12/07/2021      Lab Results   Component Value Date    FREET4 1.09 12/07/2021      No results found for: \"DDIMERQUANT\"  Magnesium   Date Value Ref Range Status   06/09/2022 2.0 1.6 - 2.4 mg/dL Final      No results found for: \"DIGOXIN\"   Lab Results   Component Value Date    TROPONINT 22 (H) 06/24/2024 6/25/2024    04:34   Lipid " Panel   Total Cholesterol 120    Triglycerides 106    HDL Cholesterol 31    VLDL Cholesterol 20    LDL Cholesterol  69    LDL/HDL Ratio 2.19        Results for orders placed during the hospital encounter of 06/24/24    Adult Transthoracic Echo Complete W/ Cont if Necessary Per Protocol (With Agitated Saline)    Interpretation Summary    Left ventricular ejection fraction appears to be 61 - 65%.    Left ventricular wall thickness is consistent with septal asymmetric hypertrophy.    Left ventricular diastolic function is consistent with (grade I) impaired relaxation and age.    The left atrial cavity is moderately dilated.    The right atrial cavity is borderline dilated.    Bubble study was negative for right to left shunt.    No significant valvular disease.         Assessment and Plan    Diagnoses and all orders for this visit:    1. TIA (transient ischemic attack) (Primary)  He denies any recurrence of symptoms, continue Eliquis 5 mg twice daily for CVA prevention.    2. Paroxysmal atrial fibrillation  Obtain 30-day cardiac event monitor to assess for recurrence of atrial fibrillation.  Currently heart rate is under control, continue metoprolol 75 mg nightly.  -     Cardiac Event Monitor; Future    Other orders  -     apixaban (ELIQUIS) 5 MG tablet tablet; Take 1 tablet by mouth 2 (Two) Times a Day.  Dispense: 180 tablet; Refill: 3            Follow Up   Return in about 6 months (around 1/10/2025) for Follow up with Dr Lr.    Patient was given instructions and counseling regarding his condition or for health maintenance advice. Please see specific information pulled into the AVS if appropriate.     Paolo Goldstein  reports that he has never smoked. He has never used smokeless tobacco.           LON Storey  07/10/24  10:21 EDT    Dictated Utilizing Dragon Dictation

## 2024-08-01 DIAGNOSIS — M17.11 OSTEOARTHRITIS OF RIGHT KNEE, UNSPECIFIED OSTEOARTHRITIS TYPE: ICD-10-CM

## 2024-08-01 DIAGNOSIS — M25.551 RIGHT HIP PAIN: ICD-10-CM

## 2024-08-01 DIAGNOSIS — M16.11 OSTEOARTHRITIS OF RIGHT HIP, UNSPECIFIED OSTEOARTHRITIS TYPE: ICD-10-CM

## 2024-08-01 DIAGNOSIS — M25.561 RIGHT KNEE PAIN, UNSPECIFIED CHRONICITY: ICD-10-CM

## 2024-08-01 RX ORDER — MELOXICAM 15 MG/1
15 TABLET ORAL DAILY
Qty: 30 TABLET | Refills: 2 | Status: SHIPPED | OUTPATIENT
Start: 2024-08-01

## 2024-08-02 ENCOUNTER — TELEPHONE (OUTPATIENT)
Dept: CARDIOLOGY | Facility: CLINIC | Age: 64
End: 2024-08-02
Payer: COMMERCIAL

## 2024-08-02 ENCOUNTER — LAB (OUTPATIENT)
Dept: LAB | Facility: HOSPITAL | Age: 64
End: 2024-08-02
Payer: COMMERCIAL

## 2024-08-02 DIAGNOSIS — D75.1 POLYCYTHEMIA: ICD-10-CM

## 2024-08-02 DIAGNOSIS — R73.01 IFG (IMPAIRED FASTING GLUCOSE): ICD-10-CM

## 2024-08-02 DIAGNOSIS — G45.9 TIA (TRANSIENT ISCHEMIC ATTACK): ICD-10-CM

## 2024-08-02 DIAGNOSIS — I10 HYPERTENSION, ESSENTIAL: ICD-10-CM

## 2024-08-02 DIAGNOSIS — E78.2 MIXED HYPERLIPIDEMIA: ICD-10-CM

## 2024-08-02 DIAGNOSIS — E66.9 OBESITY (BMI 30-39.9): ICD-10-CM

## 2024-08-02 LAB
ALBUMIN SERPL-MCNC: 3.9 G/DL (ref 3.5–5.2)
ALBUMIN/GLOB SERPL: 1.3 G/DL
ALP SERPL-CCNC: 87 U/L (ref 39–117)
ALT SERPL W P-5'-P-CCNC: 23 U/L (ref 1–41)
ANION GAP SERPL CALCULATED.3IONS-SCNC: 7.4 MMOL/L (ref 5–15)
AST SERPL-CCNC: 25 U/L (ref 1–40)
BASOPHILS # BLD AUTO: 0.06 10*3/MM3 (ref 0–0.2)
BASOPHILS NFR BLD AUTO: 0.8 % (ref 0–1.5)
BILIRUB SERPL-MCNC: 0.4 MG/DL (ref 0–1.2)
BUN SERPL-MCNC: 15 MG/DL (ref 8–23)
BUN/CREAT SERPL: 12.8 (ref 7–25)
CALCIUM SPEC-SCNC: 9.2 MG/DL (ref 8.6–10.5)
CHLORIDE SERPL-SCNC: 103 MMOL/L (ref 98–107)
CHOLEST SERPL-MCNC: 112 MG/DL (ref 0–200)
CO2 SERPL-SCNC: 27.6 MMOL/L (ref 22–29)
CREAT SERPL-MCNC: 1.17 MG/DL (ref 0.76–1.27)
DEPRECATED RDW RBC AUTO: 45.8 FL (ref 37–54)
EGFRCR SERPLBLD CKD-EPI 2021: 70 ML/MIN/1.73
EOSINOPHIL # BLD AUTO: 0.54 10*3/MM3 (ref 0–0.4)
EOSINOPHIL NFR BLD AUTO: 7.5 % (ref 0.3–6.2)
ERYTHROCYTE [DISTWIDTH] IN BLOOD BY AUTOMATED COUNT: 13 % (ref 12.3–15.4)
ERYTHROCYTE [SEDIMENTATION RATE] IN BLOOD: 5 MM/HR (ref 0–20)
GLOBULIN UR ELPH-MCNC: 3.1 GM/DL
GLUCOSE SERPL-MCNC: 102 MG/DL (ref 65–99)
HCT VFR BLD AUTO: 47.6 % (ref 37.5–51)
HDLC SERPL-MCNC: 31 MG/DL (ref 40–60)
HGB BLD-MCNC: 15.7 G/DL (ref 13–17.7)
IMM GRANULOCYTES # BLD AUTO: 0.03 10*3/MM3 (ref 0–0.05)
IMM GRANULOCYTES NFR BLD AUTO: 0.4 % (ref 0–0.5)
LDLC SERPL CALC-MCNC: 63 MG/DL (ref 0–100)
LDLC/HDLC SERPL: 2.02 {RATIO}
LYMPHOCYTES # BLD AUTO: 1.98 10*3/MM3 (ref 0.7–3.1)
LYMPHOCYTES NFR BLD AUTO: 27.6 % (ref 19.6–45.3)
MCH RBC QN AUTO: 31.8 PG (ref 26.6–33)
MCHC RBC AUTO-ENTMCNC: 33 G/DL (ref 31.5–35.7)
MCV RBC AUTO: 96.4 FL (ref 79–97)
MONOCYTES # BLD AUTO: 0.61 10*3/MM3 (ref 0.1–0.9)
MONOCYTES NFR BLD AUTO: 8.5 % (ref 5–12)
NEUTROPHILS NFR BLD AUTO: 3.95 10*3/MM3 (ref 1.7–7)
NEUTROPHILS NFR BLD AUTO: 55.2 % (ref 42.7–76)
NRBC BLD AUTO-RTO: 0 /100 WBC (ref 0–0.2)
PLATELET # BLD AUTO: 176 10*3/MM3 (ref 140–450)
PMV BLD AUTO: 10.9 FL (ref 6–12)
POTASSIUM SERPL-SCNC: 4.8 MMOL/L (ref 3.5–5.2)
PROT SERPL-MCNC: 7 G/DL (ref 6–8.5)
RBC # BLD AUTO: 4.94 10*6/MM3 (ref 4.14–5.8)
SODIUM SERPL-SCNC: 138 MMOL/L (ref 136–145)
TRIGL SERPL-MCNC: 92 MG/DL (ref 0–150)
VLDLC SERPL-MCNC: 18 MG/DL (ref 5–40)
WBC NRBC COR # BLD AUTO: 7.17 10*3/MM3 (ref 3.4–10.8)

## 2024-08-02 PROCEDURE — 80061 LIPID PANEL: CPT

## 2024-08-02 PROCEDURE — 85652 RBC SED RATE AUTOMATED: CPT

## 2024-08-02 PROCEDURE — 80053 COMPREHEN METABOLIC PANEL: CPT

## 2024-08-02 PROCEDURE — 36415 COLL VENOUS BLD VENIPUNCTURE: CPT

## 2024-08-02 PROCEDURE — 85025 COMPLETE CBC W/AUTO DIFF WBC: CPT

## 2024-08-02 NOTE — TELEPHONE ENCOUNTER
----- Message from Carmen Crawford sent at 8/2/2024  3:41 PM EDT -----  Labs are good, continue current meds

## 2024-08-08 ENCOUNTER — OFFICE VISIT (OUTPATIENT)
Dept: INTERNAL MEDICINE | Age: 64
End: 2024-08-08
Payer: COMMERCIAL

## 2024-08-08 ENCOUNTER — TELEPHONE (OUTPATIENT)
Dept: INTERNAL MEDICINE | Age: 64
End: 2024-08-08

## 2024-08-08 VITALS
HEIGHT: 67 IN | SYSTOLIC BLOOD PRESSURE: 116 MMHG | DIASTOLIC BLOOD PRESSURE: 76 MMHG | WEIGHT: 243 LBS | TEMPERATURE: 98.2 F | BODY MASS INDEX: 38.14 KG/M2 | OXYGEN SATURATION: 96 % | HEART RATE: 59 BPM

## 2024-08-08 DIAGNOSIS — D75.1 POLYCYTHEMIA: ICD-10-CM

## 2024-08-08 DIAGNOSIS — I10 HYPERTENSION, ESSENTIAL: ICD-10-CM

## 2024-08-08 DIAGNOSIS — Z12.5 SCREENING PSA (PROSTATE SPECIFIC ANTIGEN): ICD-10-CM

## 2024-08-08 DIAGNOSIS — K21.9 GASTROESOPHAGEAL REFLUX DISEASE WITHOUT ESOPHAGITIS: ICD-10-CM

## 2024-08-08 DIAGNOSIS — R74.8 ELEVATED LIVER ENZYMES: ICD-10-CM

## 2024-08-08 DIAGNOSIS — G45.9 TIA (TRANSIENT ISCHEMIC ATTACK): Primary | ICD-10-CM

## 2024-08-08 DIAGNOSIS — N18.31 STAGE 3A CHRONIC KIDNEY DISEASE: ICD-10-CM

## 2024-08-08 DIAGNOSIS — G47.10 HYPERSOMNIA: ICD-10-CM

## 2024-08-08 DIAGNOSIS — D47.2 MONOCLONAL GAMMOPATHY OF UNDETERMINED SIGNIFICANCE: ICD-10-CM

## 2024-08-08 DIAGNOSIS — I48.0 PAROXYSMAL ATRIAL FIBRILLATION: ICD-10-CM

## 2024-08-08 DIAGNOSIS — K76.0 STEATOSIS OF LIVER: ICD-10-CM

## 2024-08-08 DIAGNOSIS — R73.01 IFG (IMPAIRED FASTING GLUCOSE): ICD-10-CM

## 2024-08-08 DIAGNOSIS — E66.9 OBESITY (BMI 30-39.9): ICD-10-CM

## 2024-08-08 DIAGNOSIS — E78.2 MIXED HYPERLIPIDEMIA: ICD-10-CM

## 2024-08-08 PROCEDURE — 99214 OFFICE O/P EST MOD 30 MIN: CPT | Performed by: INTERNAL MEDICINE

## 2024-08-08 NOTE — PROGRESS NOTES
"CHIEF COMPLAINT/ HPI:  Hypertension (Routine follow up, Lab follow up, Needing to change Neuro referral to [facility]/ [facility] all records need to be faxed. )    Patient is noticed weakness to his masseter muscles, he gets weak after playing golf or chewing, he is concerned about myasthenia gravis,          Objective   Vital Signs  Vitals:    08/08/24 1024   BP: 116/76   Pulse: 59   Temp: 98.2 °F (36.8 °C)   SpO2: 96%   Weight: 110 kg (243 lb)   Height: 170.2 cm (67.01\")      Body mass index is 38.05 kg/m².  Review of Systems   Constitutional: Negative.    HENT: Negative.     Eyes: Negative.    Respiratory: Negative.     Cardiovascular: Negative.    Gastrointestinal: Negative.    Endocrine: Negative.    Genitourinary: Negative.    Musculoskeletal: Negative.    Allergic/Immunologic: Negative.    Neurological: Negative.    Hematological: Negative.    Psychiatric/Behavioral: Negative.     Patient is noticed some jaw muscle weakness, tongue and eye weakness,  Physical Exam  Constitutional:       General: He is not in acute distress.     Appearance: Normal appearance. He is obese.   HENT:      Head: Normocephalic.      Mouth/Throat:      Mouth: Mucous membranes are moist.   Eyes:      Conjunctiva/sclera: Conjunctivae normal.      Pupils: Pupils are equal, round, and reactive to light.   Cardiovascular:      Rate and Rhythm: Normal rate and regular rhythm.      Pulses: Normal pulses.      Heart sounds: Normal heart sounds.   Pulmonary:      Effort: Pulmonary effort is normal.      Breath sounds: Normal breath sounds.   Abdominal:      General: Bowel sounds are normal.      Palpations: Abdomen is soft.   Musculoskeletal:         General: No swelling. Normal range of motion.      Cervical back: Neck supple.   Skin:     General: Skin is warm and dry.      Coloration: Skin is not jaundiced.   Neurological:      General: No focal deficit present.      Mental Status: He is alert and oriented to person, place, and time. " "Mental status is at baseline.   Psychiatric:         Mood and Affect: Mood normal.         Behavior: Behavior normal.         Thought Content: Thought content normal.         Judgment: Judgment normal.        Result Review :   No results found for: \"PROBNP\", \"BNP\"  CMP          6/10/2024    09:01 6/24/2024    23:52 8/2/2024    10:21   CMP   Glucose 101  104  102    BUN 14  17  15    Creatinine 1.13  1.16  1.17    EGFR 73.0  70.8  70.0    Sodium 139  137  138    Potassium 4.5  3.9  4.8    Chloride 108  101  103    Calcium 9.2  8.7  9.2    Total Protein 6.9  6.2  7.0    Albumin 3.9  3.6  3.9    Globulin 3.0  2.6  3.1    Total Bilirubin 0.3  0.3  0.4    Alkaline Phosphatase 95  82  87    AST (SGOT) 20  26  25    ALT (SGPT) 20  27  23    Albumin/Globulin Ratio 1.3  1.4  1.3    BUN/Creatinine Ratio 12.4  14.7  12.8    Anion Gap 9.4  9.1  7.4      CBC w/diff          6/10/2024    09:01 6/24/2024    23:52 8/2/2024    10:21   CBC w/Diff   WBC 6.31  8.12  7.17    RBC 4.93  4.61  4.94    Hemoglobin 15.9  14.6  15.7    Hematocrit 46.8  43.6  47.6    MCV 94.9  94.6  96.4    MCH 32.3  31.7  31.8    MCHC 34.0  33.5  33.0    RDW 12.6  13.2  13.0    Platelets 180  178  176    Neutrophil Rel % 54.9  58.4  55.2    Immature Granulocyte Rel % 0.5  0.4  0.4    Lymphocyte Rel % 28.8  23.6  27.6    Monocyte Rel % 7.1  10.6  8.5    Eosinophil Rel % 8.2  6.3  7.5    Basophil Rel % 0.5  0.7  0.8       Lipid Panel          6/10/2024    09:01 6/25/2024    04:34 8/2/2024    10:21   Lipid Panel   Total Cholesterol 106  120  112    Triglycerides 89  106  92    HDL Cholesterol 33  31  31    VLDL Cholesterol 18  20  18    LDL Cholesterol  55  69  63    LDL/HDL Ratio 1.67  2.19  2.02       Lab Results   Component Value Date    TSH 1.750 12/07/2021    TSH 2.780 09/13/2021    TSH 2.070 08/02/2019      Lab Results   Component Value Date    FREET4 1.09 12/07/2021    FREET4 0.9 05/04/2019      A1C Last 3 Results          12/8/2023    07:39 6/10/2024    " 09:01 6/25/2024    04:34   HGBA1C Last 3 Results   Hemoglobin A1C 5.70  5.60  5.70       PSA          12/8/2023    07:39   PSA   PSA 0.862                     Visit Diagnoses:    ICD-10-CM ICD-9-CM   1. TIA (transient ischemic attack)  G45.9 435.9   2. Hypersomnia  G47.10 780.54   3. Polycythemia  D75.1 238.4   4. Steatosis of liver  K76.0 571.8   5. Elevated liver enzymes  R74.8 790.5   6. Obesity (BMI 30-39.9)  E66.9 278.00   7. Mixed hyperlipidemia  E78.2 272.2   8. Hypertension, essential  I10 401.9   9. IFG (impaired fasting glucose)  R73.01 790.21   10. Gastroesophageal reflux disease without esophagitis  K21.9 530.81   11. Screening PSA (prostate specific antigen)  Z12.5 V76.44   12. Paroxysmal atrial fibrillation  I48.0 427.31   13. Stage 3a chronic kidney disease  N18.31 585.3   14. Monoclonal gammopathy of undetermined significance  D47.2 273.1       Assessment and Plan   Diagnoses and all orders for this visit:    1. TIA (transient ischemic attack) (Primary)  -     CBC & Differential; Future  -     Comprehensive Metabolic Panel; Future  -     Hemoglobin A1c; Future  -     Lipid Panel; Future  -     PSA Screen; Future  -     Vitamin B12 anemia; Future  -     Folate anemia; Future  -     Vitamin D,25-Hydroxy; Future    2. Hypersomnia  -     CBC & Differential; Future  -     Comprehensive Metabolic Panel; Future  -     Hemoglobin A1c; Future  -     Lipid Panel; Future  -     PSA Screen; Future  -     Vitamin B12 anemia; Future  -     Folate anemia; Future  -     Vitamin D,25-Hydroxy; Future    3. Polycythemia  -     CBC & Differential; Future  -     Comprehensive Metabolic Panel; Future  -     Hemoglobin A1c; Future  -     Lipid Panel; Future  -     PSA Screen; Future  -     Vitamin B12 anemia; Future  -     Folate anemia; Future  -     Vitamin D,25-Hydroxy; Future    4. Steatosis of liver  -     CBC & Differential; Future  -     Comprehensive Metabolic Panel; Future  -     Hemoglobin A1c; Future  -      Lipid Panel; Future  -     PSA Screen; Future  -     Vitamin B12 anemia; Future  -     Folate anemia; Future  -     Vitamin D,25-Hydroxy; Future    5. Elevated liver enzymes  -     CBC & Differential; Future  -     Comprehensive Metabolic Panel; Future  -     Hemoglobin A1c; Future  -     Lipid Panel; Future  -     PSA Screen; Future  -     Vitamin B12 anemia; Future  -     Folate anemia; Future  -     Vitamin D,25-Hydroxy; Future    6. Obesity (BMI 30-39.9)  -     CBC & Differential; Future  -     Comprehensive Metabolic Panel; Future  -     Hemoglobin A1c; Future  -     Lipid Panel; Future  -     PSA Screen; Future  -     Vitamin B12 anemia; Future  -     Folate anemia; Future  -     Vitamin D,25-Hydroxy; Future    7. Mixed hyperlipidemia  -     CBC & Differential; Future  -     Comprehensive Metabolic Panel; Future  -     Hemoglobin A1c; Future  -     Lipid Panel; Future  -     PSA Screen; Future  -     Vitamin B12 anemia; Future  -     Folate anemia; Future  -     Vitamin D,25-Hydroxy; Future    8. Hypertension, essential  -     CBC & Differential; Future  -     Comprehensive Metabolic Panel; Future  -     Hemoglobin A1c; Future  -     Lipid Panel; Future  -     PSA Screen; Future  -     Vitamin B12 anemia; Future  -     Folate anemia; Future  -     Vitamin D,25-Hydroxy; Future    9. IFG (impaired fasting glucose)  -     CBC & Differential; Future  -     Comprehensive Metabolic Panel; Future  -     Hemoglobin A1c; Future  -     Lipid Panel; Future  -     PSA Screen; Future  -     Vitamin B12 anemia; Future  -     Folate anemia; Future  -     Vitamin D,25-Hydroxy; Future    10. Gastroesophageal reflux disease without esophagitis  -     CBC & Differential; Future  -     Comprehensive Metabolic Panel; Future  -     Hemoglobin A1c; Future  -     Lipid Panel; Future  -     PSA Screen; Future  -     Vitamin B12 anemia; Future  -     Folate anemia; Future  -     Vitamin D,25-Hydroxy; Future    11. Screening PSA  (prostate specific antigen)  -     CBC & Differential; Future  -     Comprehensive Metabolic Panel; Future  -     Hemoglobin A1c; Future  -     Lipid Panel; Future  -     PSA Screen; Future  -     Vitamin B12 anemia; Future  -     Folate anemia; Future  -     Vitamin D,25-Hydroxy; Future    12. Paroxysmal atrial fibrillation  -     CBC & Differential; Future  -     Comprehensive Metabolic Panel; Future  -     Hemoglobin A1c; Future  -     Lipid Panel; Future  -     PSA Screen; Future  -     Vitamin B12 anemia; Future  -     Folate anemia; Future  -     Vitamin D,25-Hydroxy; Future    13. Stage 3a chronic kidney disease  -     CBC & Differential; Future  -     Comprehensive Metabolic Panel; Future  -     Hemoglobin A1c; Future  -     Lipid Panel; Future  -     PSA Screen; Future  -     Vitamin B12 anemia; Future  -     Folate anemia; Future  -     Vitamin D,25-Hydroxy; Future    14. Monoclonal gammopathy of undetermined significance  -     CBC & Differential; Future  -     Comprehensive Metabolic Panel; Future  -     Hemoglobin A1c; Future  -     Lipid Panel; Future  -     PSA Screen; Future  -     Vitamin B12 anemia; Future  -     Folate anemia; Future  -     Vitamin D,25-Hydroxy; Future        TIA symptoms, slurred speech thickened tongue, June /July 2024, workup at the hospital--- CT angiogram of the head showed atherosclerotic disease of the carotid arteries but no measurable stenosis no vertebral dissection or carotid dissection no flow-limiting stenosis, no aneurysm, ----MRI of the brain shows no acute brain abnormality moderate paranasal sinus disease,, chest x-ray no active disease CT perfusion scan showed abnormal study demonstrating 37 mL area of ischemia at risk tissue in the posterior fossa and temporal lobes right greater than left, =======labs showed hemoglobin A1c of 5.7 cholesterol 120 LDL of 69, echocardiogram, June 2024 shows normal ejection fraction mild septal asymmetric hypertrophy grade 1  diastolic dysfunction moderate left atrial enlargement bubble study was negative no significant valve disease--- would continue Eliquis, and low-dose aspirin, ----------- currently receiving 30-day event monitor to rule out underlying paroxysmal atrial fibrillation, and has appointment at [facility] September 12, 2024,-------R/O MYASTHENIA GRAVIS --- discussed getting panel patient is going to wait for his [facility] appointment in September and get the blood work done at that time,      Polycythemia -----phlebotomy plan was also put in today July 3, 2024 with change for frequency to every 3 months for hematocrit greater than or equal to 40 and holding for less than 39--- patient is undergoing some blood testing JAK2 erythropoietin levels etc. even considering bone marrow biopsy, with hematologist, August 2024     R elan, --April 2024, --chadwick augustin, courtney/ derrek --    Stress test April 7, 2023 shows normal exercise capacity low risk study no evidence of ischemia, Dr. Lr    CKD stage IIIa,, creatinine better at 1.1 June 2024    Osteomyelitis, right ankle, has finished up course of antibiotics treatment options including continue doxycycline for 6 months versus following sed rates retreating if sed rates increase, discussed with patient December 9, 2021 --------------------patient had surgery for I&D and bone biopsy sept 1, 2021--- showing MRSA and gout into the right ankle and foot on September 1, 2021 by Dr. [facility] at [facility] --- through [facility], had a PICC line placed, was put on IV antibiotics by infectious disease doctor  on vancomycin every 12 hours at home, for total of 6 weeks----released from orthopedics care as of January 2022,, sed rate CRPs both levels are undetectable or essentially 0 as of March 2022,     Impaired fasting glucose, hemoglobin A1c 5.6 Ana Maria 10, 2024    LAP CHOLEY, AND POST OP BILE LEAK/ CBD OBSTRUCTION, READMITTED,AND HAD ERCP WITH DR PICKARD---  DEVELOPED AFIB WITH RVR, SEPTIC , ELEVATED WBC, APRIL 2019, CARDIOVERTED, WAS RX WITH ABX, HAD MARIAN DRAIN PLACED BY RADIOLOGY-- AND THEN ANOTHER MARIAN DRAIN PLACED, --THORACENTISIS ALSO DONE TIMES 2, --HAD STENT REMOVED JUNE 2019, HAD STRESS TEST WITH [facility],, May 2019, no evidence of ischemia and normal myocardial SPECT perfusion study,-------CARDIAC EVENT MONITOR 8/2019, --NO AFIB - Stopped eliquis October 2019    Gout, cont allopurinol 300 mg qd, uric acid level 4.9 December 2022,    VASC CALCIFICATIONS SEEN ON CAROTIDS ---ON PLAIN XRAY PER [facility] FOR BONE SURVERY ----- CAROTID U/S, MARCH 2021,, no stenosis present carotid bifurcations, mild to moderate mixed density nonstenotic plaque bilateral, vertebral flow antegrade,, continue statin therapy if possible,    JAMEE ---because of polycythemia, sleep study at the request of the [facility] physician, June 2023,, now on CPAP, June 2024    Psa= 0.862 dec 2023     HTN--cont ATACAND 32 MG QD, METOPROLOL  75 QD     ELEVATED CHOL.--continues Qunol, Crestor, 15 mg daily, Zetia 10 mg daily    ELEVATED LFTS, KEENAN--going back to 2016--currently normal June 8, 2023,------- ultrasounds liver reviewed, October 2017 and March 2017, shows improvement in liver size,---Has had ultrasounds and fibrosis scan October 2019, slightly elevated score of 0.39 stage F1 to F2, moderate steatosis, has been followed by GI service, -- ultrasound March 5, 2021 shows normal echogenicity of the liver texture, no liver lesions, no biliary ductal dilation, otherwise unremarkable--ultrasound of the liver September 1, 2022 shows liver size at 16.1 cm otherwise unremarkable scan,    MONOCLONAL PARAPROTEINURIA, DX IN 2015-16, NOW GOING TO [facility] FOR F/U Total proteins have been stable over the last several years through [facility] labs, no treatment at this time    OA, OF L HIP AND NICOLAS 12/13, KAREN,,     OVERWGT-continue weight loss efforts, start walking more, exercise if he can,, discussed December  2023--- patient's clinic consider using GLP-1's but he is going to continue diet and exercise, discussed August 2024    LVH ON EKG AND ECHO 2016    CSCOPE 9/13 AND EGD , AND AUG 2017, NEERAJ colonoscopy August 19, 2022 tubular adenoma,             Follow Up   Return in about 5 months (around 1/8/2025).  Patient was given instructions and counseling regarding his condition or for health maintenance advice. Please see specific information pulled into the AVS if appropriate.

## 2024-08-08 NOTE — TELEPHONE ENCOUNTER
The referral in the system needs to be canceled , pt has a upcoming appt with [facility] Ashley in [place].Would you be able to redo the referral and fax all information, records over.

## 2024-08-20 ENCOUNTER — TELEPHONE (OUTPATIENT)
Dept: CARDIOLOGY | Facility: CLINIC | Age: 64
End: 2024-08-20
Payer: COMMERCIAL

## 2024-08-20 NOTE — TELEPHONE ENCOUNTER
Per Dr Lr, since the patient has had only one episode of atrial fibrillation with surgery and one with pneumonia, then he could try coming off the eliquis. However, we cannot know for sure if he is having asymptomatic episodes of atrial fibrillation which means he could still be at risk for stroke occuring. If he decides to stop eliquis, then would recommend he get a smart watch device to help screen for recurrence of atrial fibrillation.

## 2024-08-20 NOTE — TELEPHONE ENCOUNTER
Received call from patient. Patient asking since the 30 day event monitor was good and did not show afib if he could come off the Eliquis.     Please advise

## 2024-08-22 ENCOUNTER — HOSPITAL ENCOUNTER (OUTPATIENT)
Dept: INFUSION THERAPY | Facility: HOSPITAL | Age: 64
Discharge: HOME | End: 2024-08-22
Admitting: INTERNAL MEDICINE
Payer: COMMERCIAL

## 2024-08-22 VITALS
TEMPERATURE: 98 F | WEIGHT: 239.64 LBS | BODY MASS INDEX: 37.61 KG/M2 | DIASTOLIC BLOOD PRESSURE: 76 MMHG | RESPIRATION RATE: 17 BRPM | HEIGHT: 67 IN | HEART RATE: 59 BPM | OXYGEN SATURATION: 96 % | SYSTOLIC BLOOD PRESSURE: 129 MMHG

## 2024-08-22 DIAGNOSIS — D75.1 POLYCYTHEMIA: ICD-10-CM

## 2024-08-22 LAB
HCT VFR BLD AUTO: 44.2 % (ref 37.5–51)
HGB BLD-MCNC: 14.9 G/DL (ref 13–17.7)

## 2024-08-22 PROCEDURE — 85014 HEMATOCRIT: CPT | Performed by: INTERNAL MEDICINE

## 2024-08-22 PROCEDURE — 36415 COLL VENOUS BLD VENIPUNCTURE: CPT

## 2024-08-22 PROCEDURE — 85018 HEMOGLOBIN: CPT | Performed by: INTERNAL MEDICINE

## 2024-08-22 PROCEDURE — 99195 PHLEBOTOMY: CPT

## 2024-08-22 RX ORDER — SODIUM CHLORIDE 9 MG/ML
250 INJECTION, SOLUTION INTRAVENOUS ONCE
OUTPATIENT
Start: 2024-09-01

## 2024-08-22 NOTE — CODE DOCUMENTATION
Blood flow stopped at 350 ml, patient declined second stick. Phlebtomy was completed at this time per patient request. Allyson Ha RN      Interpolation Flap Text: A decision was made to reconstruct the defect utilizing an interpolation axial flap and a staged reconstruction.  A telfa template was made of the defect.  This telfa template was then used to outline the interpolation flap.  The donor area for the pedicle flap was then injected with anesthesia.  The flap was excised through the skin and subcutaneous tissue down to the layer of the underlying musculature.  The interpolation flap was carefully excised within this deep plane to maintain its blood supply.  The edges of the donor site were undermined.   The donor site was closed in a primary fashion.  The pedicle was then rotated into position and sutured.  Once the tube was sutured into place, adequate blood supply was confirmed with blanching and refill.  The pedicle was then wrapped with xeroform gauze and dressed appropriately with a telfa and gauze bandage to ensure continued blood supply and protect the attached pedicle.

## 2024-09-19 ENCOUNTER — TRANSCRIBE ORDERS (OUTPATIENT)
Dept: GENERAL RADIOLOGY | Facility: HOSPITAL | Age: 64
End: 2024-09-19
Payer: COMMERCIAL

## 2024-09-19 ENCOUNTER — TRANSCRIBE ORDERS (OUTPATIENT)
Dept: CT IMAGING | Facility: HOSPITAL | Age: 64
End: 2024-09-19
Payer: COMMERCIAL

## 2024-09-19 DIAGNOSIS — G70.00 MYASTHENIA GRAVIS WITHOUT EXACERBATION: Primary | ICD-10-CM

## 2024-09-30 PROBLEM — R05.1 ACUTE COUGH: Status: ACTIVE | Noted: 2024-09-30

## 2024-10-03 ENCOUNTER — HOSPITAL ENCOUNTER (OUTPATIENT)
Dept: CT IMAGING | Facility: HOSPITAL | Age: 64
Discharge: HOME | End: 2024-10-03
Admitting: STUDENT IN AN ORGANIZED HEALTH CARE EDUCATION/TRAINING PROGRAM
Payer: COMMERCIAL

## 2024-10-03 ENCOUNTER — OFFICE VISIT (OUTPATIENT)
Dept: INTERNAL MEDICINE | Age: 64
End: 2024-10-03
Payer: COMMERCIAL

## 2024-10-03 VITALS
HEIGHT: 67 IN | BODY MASS INDEX: 36.32 KG/M2 | RESPIRATION RATE: 18 BRPM | SYSTOLIC BLOOD PRESSURE: 118 MMHG | HEART RATE: 68 BPM | TEMPERATURE: 98.4 F | DIASTOLIC BLOOD PRESSURE: 66 MMHG | WEIGHT: 231.4 LBS | OXYGEN SATURATION: 97 %

## 2024-10-03 DIAGNOSIS — J22 LOWER RESPIRATORY TRACT INFECTION: Primary | ICD-10-CM

## 2024-10-03 DIAGNOSIS — G70.00 MYASTHENIA GRAVIS WITHOUT EXACERBATION: ICD-10-CM

## 2024-10-03 LAB
CREAT BLDA-MCNC: 1.4 MG/DL (ref 0.6–1.3)
EGFRCR SERPLBLD CKD-EPI 2021: 56.1 ML/MIN/1.73

## 2024-10-03 PROCEDURE — 96372 THER/PROPH/DIAG INJ SC/IM: CPT

## 2024-10-03 PROCEDURE — 25510000001 IOPAMIDOL 61 % SOLUTION: Performed by: STUDENT IN AN ORGANIZED HEALTH CARE EDUCATION/TRAINING PROGRAM

## 2024-10-03 PROCEDURE — 99213 OFFICE O/P EST LOW 20 MIN: CPT

## 2024-10-03 PROCEDURE — 71260 CT THORAX DX C+: CPT

## 2024-10-03 PROCEDURE — 82565 ASSAY OF CREATININE: CPT

## 2024-10-03 RX ORDER — IOPAMIDOL 612 MG/ML
100 INJECTION, SOLUTION INTRAVASCULAR
Status: COMPLETED | OUTPATIENT
Start: 2024-10-03 | End: 2024-10-03

## 2024-10-03 RX ORDER — PREDNISONE 20 MG/1
TABLET ORAL
Qty: 9 TABLET | Refills: 0 | Status: SHIPPED | OUTPATIENT
Start: 2024-10-03 | End: 2024-10-09

## 2024-10-03 RX ORDER — CEFTRIAXONE 1 G/1
1 INJECTION, POWDER, FOR SOLUTION INTRAMUSCULAR; INTRAVENOUS ONCE
Status: COMPLETED | OUTPATIENT
Start: 2024-10-03 | End: 2024-10-03

## 2024-10-03 RX ADMIN — IOPAMIDOL 100 ML: 612 INJECTION, SOLUTION INTRAVENOUS at 15:06

## 2024-10-03 RX ADMIN — CEFTRIAXONE 1 G: 1 INJECTION, POWDER, FOR SOLUTION INTRAMUSCULAR; INTRAVENOUS at 11:21

## 2024-10-03 NOTE — ASSESSMENT & PLAN NOTE
Patient started with symptoms last Friday.   He started with progressive cough and congestion. He also noticed he was using his incentive spirometer and his numbers have worsened.  Denies fever, chills, nausea, vomiting, diarrhea.  He was started on Augmentin on Monday and symptoms are improving but still present. He does have some clear sputum at times.  He does have an albuterol inhaler PRN.  Lungs-wheezes and rhonchi to auscultation.  Chest xray neg at urgent care as was flu/covid  Will give him injection of rocephin and increase his prednisone for the next 6 days.   If he develops fever, soa, or worsening symptoms, he should seek medical attn right away.  She is scheduled for CT of the chest today already due to MG

## 2024-10-03 NOTE — PROGRESS NOTES
Chief Complaint  Follow-up (64 year old male here today for a follow up from  due to URI. States he does feel some better but still has a cough. )    History of Present Illness  SUBJECTIVE  Paolo Goldstein presents to [facility] follow up on urgent care visit for URI.    Patient started with symptoms last Friday.   He started with progressive cough and congestion. He also noticed he was using his incentive spirometer and his numbers have worsened.  Denies fever, chills, nausea, vomiting, diarrhea.  He was started on Augmentin on Monday and symptoms are improving but still present. He does have some clear sputum at times.  He does have an albuterol inhaler PRN.      Past Medical History:   Diagnosis Date    A-fib     Acid reflux     Arthritis     Carotid atherosclerosis, bilateral 12/09/2021    Colon polyps     Essential hypertension     Family history of colon cancer     Fatty liver     GERD (gastroesophageal reflux disease)     Hip pain     LEFT     Insomnia     MGUS (monoclonal gammopathy of unknown significance)     Osteomyelitis of ankle     patient reports right ankle    Pes cavus of left foot 01/12/2019    Primary osteoarthritis of left foot 01/12/2019    Sciatic nerve pain       Family History   Problem Relation Age of Onset    Stroke Mother     Arthritis Mother     Thyroid disease Mother     Prostate cancer Father 69    Heart disease Father     Colon cancer Paternal Aunt     Colon cancer Paternal Uncle     Colon cancer Paternal Grandmother 60      Past Surgical History:   Procedure Laterality Date    ANKLE SURGERY Right 1977    RIGHT ANKLE CLOSED REDUCTION     CARDIOVERSION      CHOLECYSTECTOMY      LAPAROSCOPIC    COLONOSCOPY  04/21/2017    DR. PICKARD    COLONOSCOPY N/A 8/19/2022    Procedure: COLONOSCOPY WITH POLYPECTOMY;  Surgeon: Janes Pickard MD;  Location: [facility];  Service: Gastroenterology;  Laterality: N/A;  COLON POLYP, DIVERTICULOSIS     ENDOSCOPY  08/09/2013        HIP ARTHROPLASTY Left     HIP BIPOLAR REPLACEMENT Left     OTHER SURGICAL HISTORY      ARTIFICAL JOINTS/LIMBS     OTHER SURGICAL HISTORY      JOINT SURGERY    OTHER SURGICAL HISTORY      I & D for osteomylitis right foot        Current Outpatient Medications:     acetaminophen (TYLENOL) 650 MG 8 hr tablet, Take 2 tablets by mouth Daily., Disp: , Rfl:     albuterol sulfate  (90 Base) MCG/ACT inhaler, Inhale 2 puffs Every 4 (Four) Hours As Needed for Wheezing or Shortness of Air., Disp: 18 g, Rfl: 0    allopurinol (ZYLOPRIM) 300 MG tablet, TAKE 1 TABLET BY MOUTH DAILY, Disp: 30 tablet, Rfl: 5    amoxicillin-clavulanate (AUGMENTIN) 875-125 MG per tablet, Take 1 tablet by mouth 2 (Two) Times a Day for 10 days., Disp: 20 tablet, Rfl: 0    aspirin 81 MG EC tablet, Aspir-81 81 mg oral tablet,delayed release (DR/EC) take 1 tablet (81 mg) by oral route once daily   Active, Disp: , Rfl:     Calcium Carb-Cholecalciferol (CALCIUM 600 + D PO), Take  by mouth., Disp: , Rfl:     candesartan (ATACAND) 16 MG tablet, Take 1 tablet by mouth Daily., Disp: , Rfl:     Cholecalciferol 25 MCG (1000 UT) tablet, Take 1 tablet by mouth Daily., Disp: , Rfl:     coenzyme Q10 100 MG capsule, Take 1 capsule by mouth Daily., Disp: , Rfl:     colestipol (COLESTID) 5 g granules, Take 1 g by mouth., Disp: , Rfl:     ezetimibe (ZETIA) 10 MG tablet, Take 1 tablet by mouth Daily., Disp: 90 tablet, Rfl: 3    Loratadine 10 MG capsule, As Needed., Disp: , Rfl:     meloxicam (MOBIC) 15 MG tablet, TAKE 1 TABLET BY MOUTH DAILY, Disp: 30 tablet, Rfl: 2    metoprolol succinate XL (TOPROL-XL) 50 MG 24 hr tablet, Take 1.5 tablets by mouth Every Night. (Patient taking differently: Take 1 tablet by mouth Every Night.), Disp: 135 tablet, Rfl: 3    pantoprazole (PROTONIX) 40 MG EC tablet, Take 1 tablet by mouth Daily., Disp: 90 tablet, Rfl: 3    predniSONE (DELTASONE) 10 MG tablet, Take 1 tablet by mouth Daily., Disp: ,  "Rfl:     pyridostigmine (MESTINON) 60 MG tablet, Take 1 tablet by mouth 3 (Three) Times a Day., Disp: , Rfl:     rosuvastatin (CRESTOR) 10 MG tablet, Take 1.5 tablets by mouth Daily., Disp: 135 tablet, Rfl: 3    predniSONE (DELTASONE) 20 MG tablet, Take 2 tablets by mouth Daily for 3 days, THEN 1 tablet Daily for 3 days. Take 2 tablets daily for 3 days, then take 1 tablet daily for 3 days, Disp: 9 tablet, Rfl: 0  No current facility-administered medications for this visit.    OBJECTIVE  Vital Signs:   /66 (BP Location: Left arm, Patient Position: Sitting)   Pulse 68   Temp 98.4 °F (36.9 °C) (Temporal)   Resp 18   Ht 170.2 cm (67\")   Wt 105 kg (231 lb 6.4 oz)   SpO2 97%   BMI 36.24 kg/m²    Estimated body mass index is 36.24 kg/m² as calculated from the following:    Height as of this encounter: 170.2 cm (67\").    Weight as of this encounter: 105 kg (231 lb 6.4 oz).     Wt Readings from Last 3 Encounters:   10/03/24 105 kg (231 lb 6.4 oz)   09/30/24 104 kg (229 lb)   08/22/24 109 kg (239 lb 10.2 oz)     BP Readings from Last 3 Encounters:   10/03/24 118/66   09/30/24 128/79   08/22/24 129/76       Physical Exam  Vitals and nursing note reviewed.   Constitutional:       Appearance: Normal appearance.   HENT:      Head: Normocephalic.   Eyes:      Extraocular Movements: Extraocular movements intact.      Conjunctiva/sclera: Conjunctivae normal.   Cardiovascular:      Rate and Rhythm: Normal rate and regular rhythm.      Heart sounds: Normal heart sounds. No murmur heard.  Pulmonary:      Effort: Pulmonary effort is normal.      Breath sounds: Rhonchi and rales present.   Abdominal:      General: Bowel sounds are normal.      Palpations: Abdomen is soft.      Tenderness: There is no abdominal tenderness. There is no guarding.   Musculoskeletal:         General: No swelling. Normal range of motion.      Cervical back: Normal range of motion and neck supple.   Skin:     General: Skin is warm and dry. "   Neurological:      General: No focal deficit present.      Mental Status: He is alert. Mental status is at baseline.   Psychiatric:         Mood and Affect: Mood normal.         Thought Content: Thought content normal.          Result Review        XR Chest 2 View    Result Date: 9/30/2024  Impression: 1. No acute cardiopulmonary disease. Electronically Signed: Clarence Fowler MD  9/30/2024 9:16 AM EDT  Workstation ID: [id number]    MRI Brain Without Contrast    Result Date: 6/25/2024  Impression: 1.No acute intracranial process identified. 2.Moderate paranasal sinus mucosal disease. Electronically Signed: Kamlesh Zhang MD  6/25/2024 11:50 AM EDT  Workstation ID: [id number]    CT Angiogram Head w AI Analysis of LVO    Result Date: 6/25/2024  1. Atherosclerotic disease in the carotid arteries in the neck, but no measurable carotid stenosis. No carotid or vertebral arterial dissection. 2. No intracranial flow limiting stenosis or large vessel occlusion. There is an anterior dominant circulation with bilateral fetal origin PCAs. No aneurysm. Electronically Signed: Dionte Francis MD  6/25/2024 1:06 AM EDT  Workstation ID: [id number]    CT Angiogram Neck    Result Date: 6/25/2024  1. Atherosclerotic disease in the carotid arteries in the neck, but no measurable carotid stenosis. No carotid or vertebral arterial dissection. 2. No intracranial flow limiting stenosis or large vessel occlusion. There is an anterior dominant circulation with bilateral fetal origin PCAs. No aneurysm. Electronically Signed: Dionte Francis MD  6/25/2024 1:06 AM EDT  Workstation ID: [id number]    XR Chest 1 View    Result Date: 6/25/2024  No acute cardiopulmonary findings. Electronically Signed: Dionte Francis MD  6/25/2024 12:29 AM EDT  Workstation ID: [id number]    CT CEREBRAL PERFUSION WITH & WITHOUT CONTRAST    Result Date: 6/25/2024  Abnormal study demonstrating 37 mL of ischemic at risk tissue in the posterior fossa and temporal lobes, right  greater than left. Consider MRI for follow-up.    Electronically Signed By-Dr. Dionte Francis MD On:6/25/2024 12:12 AM      CT Head Without Contrast Stroke Protocol    Result Date: 6/24/2024  Chronic changes in the paranasal sinuses. No acute findings in the brain. Electronically Signed: Dionte Francis MD  6/24/2024 11:58 PM EDT  Workstation ID: [id number]       The above data has been reviewed by LON Modi 10/03/2024 10:28 EDT.          Patient Care Team:  Stanford Howe MD as PCP - General (Internal Medicine)            ASSESSMENT & PLAN    Diagnoses and all orders for this visit:    1. Lower respiratory tract infection (Primary)  Assessment & Plan:  Patient started with symptoms last Friday.   He started with progressive cough and congestion. He also noticed he was using his incentive spirometer and his numbers have worsened.  Denies fever, chills, nausea, vomiting, diarrhea.  He was started on Augmentin on Monday and symptoms are improving but still present. He does have some clear sputum at times.  He does have an albuterol inhaler PRN.  Lungs-wheezes and rhonchi to auscultation.  Chest xray neg at urgent care as was flu/covid  Will give him injection of rocephin and increase his prednisone for the next 6 days.   If he develops fever, soa, or worsening symptoms, he should seek medical attn right away.  She is scheduled for CT of the chest today already due to MG     Orders:  -     predniSONE (DELTASONE) 20 MG tablet; Take 2 tablets by mouth Daily for 3 days, THEN 1 tablet Daily for 3 days. Take 2 tablets daily for 3 days, then take 1 tablet daily for 3 days  Dispense: 9 tablet; Refill: 0  -     cefTRIAXone (ROCEPHIN) injection 1 g         Tobacco Use: Low Risk  (10/3/2024)    Patient History     Smoking Tobacco Use: Never     Smokeless Tobacco Use: Never     Passive Exposure: Not on file       Follow Up     Return if symptoms worsen or fail to improve.    Please note that portions of this note  were completed with a voice recognition program.    Patient was given instructions and counseling regarding his condition or for health maintenance advice. Please see specific information pulled into the AVS if appropriate.   I have reviewed information obtained and documented by others and I have confirmed the accuracy of this documented note.    LON Modi

## 2024-10-28 DIAGNOSIS — M16.11 OSTEOARTHRITIS OF RIGHT HIP, UNSPECIFIED OSTEOARTHRITIS TYPE: ICD-10-CM

## 2024-10-28 DIAGNOSIS — M25.561 RIGHT KNEE PAIN, UNSPECIFIED CHRONICITY: ICD-10-CM

## 2024-10-28 DIAGNOSIS — M17.11 OSTEOARTHRITIS OF RIGHT KNEE, UNSPECIFIED OSTEOARTHRITIS TYPE: ICD-10-CM

## 2024-10-28 DIAGNOSIS — M25.551 RIGHT HIP PAIN: ICD-10-CM

## 2024-10-28 RX ORDER — MELOXICAM 15 MG/1
15 TABLET ORAL DAILY
Qty: 30 TABLET | Refills: 2 | Status: SHIPPED | OUTPATIENT
Start: 2024-10-28

## 2024-10-31 RX ORDER — EZETIMIBE 10 MG/1
10 TABLET ORAL DAILY
Qty: 90 TABLET | Refills: 3 | Status: SHIPPED | OUTPATIENT
Start: 2024-10-31

## 2024-12-03 ENCOUNTER — HOSPITAL ENCOUNTER (OUTPATIENT)
Dept: INFUSION THERAPY | Facility: HOSPITAL | Age: 64
Discharge: HOME | End: 2024-12-03
Admitting: INTERNAL MEDICINE
Payer: COMMERCIAL

## 2024-12-03 VITALS
DIASTOLIC BLOOD PRESSURE: 62 MMHG | WEIGHT: 238.76 LBS | HEIGHT: 67 IN | OXYGEN SATURATION: 99 % | TEMPERATURE: 97.4 F | HEART RATE: 61 BPM | BODY MASS INDEX: 37.47 KG/M2 | SYSTOLIC BLOOD PRESSURE: 137 MMHG | RESPIRATION RATE: 18 BRPM

## 2024-12-03 DIAGNOSIS — D75.1 POLYCYTHEMIA: Primary | ICD-10-CM

## 2024-12-03 LAB
HCT VFR BLD AUTO: 47.2 % (ref 37.5–51)
HGB BLD-MCNC: 15.8 G/DL (ref 13–17.7)

## 2024-12-03 PROCEDURE — 36415 COLL VENOUS BLD VENIPUNCTURE: CPT

## 2024-12-03 PROCEDURE — 85014 HEMATOCRIT: CPT | Performed by: INTERNAL MEDICINE

## 2024-12-03 PROCEDURE — 85018 HEMOGLOBIN: CPT | Performed by: INTERNAL MEDICINE

## 2024-12-03 PROCEDURE — 99195 PHLEBOTOMY: CPT

## 2024-12-03 RX ORDER — SODIUM CHLORIDE 9 MG/ML
250 INJECTION, SOLUTION INTRAVENOUS ONCE
OUTPATIENT
Start: 2025-02-01

## 2024-12-03 RX ORDER — SODIUM CHLORIDE 9 MG/ML
250 INJECTION, SOLUTION INTRAVENOUS ONCE
Status: DISCONTINUED | OUTPATIENT
Start: 2024-12-03 | End: 2024-12-05 | Stop reason: HOSPADM

## 2024-12-04 ENCOUNTER — OFFICE VISIT (OUTPATIENT)
Dept: SLEEP MEDICINE | Facility: HOSPITAL | Age: 64
End: 2024-12-04
Payer: COMMERCIAL

## 2024-12-04 VITALS
SYSTOLIC BLOOD PRESSURE: 100 MMHG | OXYGEN SATURATION: 96 % | HEART RATE: 61 BPM | WEIGHT: 234 LBS | DIASTOLIC BLOOD PRESSURE: 50 MMHG | HEIGHT: 67 IN | BODY MASS INDEX: 36.73 KG/M2

## 2024-12-04 DIAGNOSIS — D75.1 POLYCYTHEMIA: ICD-10-CM

## 2024-12-04 DIAGNOSIS — G45.9 TIA (TRANSIENT ISCHEMIC ATTACK): ICD-10-CM

## 2024-12-04 DIAGNOSIS — I48.0 PAROXYSMAL ATRIAL FIBRILLATION: ICD-10-CM

## 2024-12-04 DIAGNOSIS — G47.33 OSA (OBSTRUCTIVE SLEEP APNEA): Primary | ICD-10-CM

## 2024-12-04 DIAGNOSIS — G47.34 SLEEP RELATED HYPOXIA: ICD-10-CM

## 2024-12-04 DIAGNOSIS — G70.00 MYASTHENIA GRAVIS: ICD-10-CM

## 2024-12-04 DIAGNOSIS — G47.10 HYPERSOMNIA: ICD-10-CM

## 2024-12-04 DIAGNOSIS — Z72.821 POOR SLEEP HYGIENE: ICD-10-CM

## 2024-12-04 PROCEDURE — G0463 HOSPITAL OUTPT CLINIC VISIT: HCPCS

## 2024-12-04 RX ORDER — SODIUM FLUORIDE 5 MG/G
CREAM DENTAL
COMMUNITY
Start: 2024-11-04

## 2024-12-04 NOTE — PROGRESS NOTES
Sleep Disorder Follow Up    Patient Name: Paolo Goldstein  Age/Sex: 64 y.o. male  : 1960  MRN: [id number]    Date of Encounter Visit: 24   Referring Provider: Donna Saucedo MD  Place of Service: [facility]  Patient Care Team:  Stanford Howe MD as PCP - General (Internal Medicine)    PROBLEM LIST:  JAMEE  Sleep-related hypoxemia  Hypertension  Paroxysmal atrial fibrillation (triggered by sepsis, no need for AC)  Hypersomnia  Polycythemia  Myasthenia gravis    History of Present Illness:  Paolo Goldstein is a 64 y.o. male who is here for follow up on JAMEE. Patient was last seen in the office on 2023.  Since last visit, patient had a home sleep study on 10/15/2023 that showed moderate sleep apnea with hypoxemia and its CPAP was initiated and he is here for follow-up on the treatment.  Patient uses machine every night with no complaints from the mask or the pressure.  Patient uses a nasal mask, which does  fit well. Patient no significant air leak.  Minimal dry mouth.   Patient sleeps better and has a deeper sleep with the machine and feels more energy during the day time.  Currently on 5-15 cm H20.  [place] Sleepiness Scale (ESS) is 14  Compliance download was reviewed and documented below.  Weight is down by 2 pounds since last visit.  He is suboptimal on his compliance with the CPAP which is something he needs to work on  Overall he likes the response and he did report significant change in his daytime sleepiness  Since last visit patient had what looked like TIA however on further evaluation he was diagnosed with myasthenia gravis  Other comorbidities include hypertension, hyperlipidemia, paroxysmal atrial fibrillation, polycythemia of the familial type, and monoclonal gammopathy    Review of Systems:   A ten-system review was conducted and was negative except for the following: Dry nose/mouth.        Past Medical History:  Past medical, surgical, social,  and family history, except as mentioned above, was unchanged from the last visit.     Past Medical History:   Diagnosis Date    A-fib     Acid reflux     Arthritis     Carotid atherosclerosis, bilateral 12/09/2021    Colon polyps     Essential hypertension     Family history of colon cancer     Fatty liver     GERD (gastroesophageal reflux disease)     Hip pain     LEFT     Insomnia     MGUS (monoclonal gammopathy of unknown significance)     Osteomyelitis of ankle     patient reports right ankle    Pes cavus of left foot 01/12/2019    Primary osteoarthritis of left foot 01/12/2019    Sciatic nerve pain        Past Surgical History:   Procedure Laterality Date    ANKLE SURGERY Right 1977    RIGHT ANKLE CLOSED REDUCTION     CARDIOVERSION      CHOLECYSTECTOMY      LAPAROSCOPIC    COLONOSCOPY  04/21/2017    DR. PICKARD    COLONOSCOPY N/A 8/19/2022    Procedure: COLONOSCOPY WITH POLYPECTOMY;  Surgeon: Janes Pickard MD;  Location: [facility] ENDOSCOPY;  Service: Gastroenterology;  Laterality: N/A;  COLON POLYP, DIVERTICULOSIS    ENDOSCOPY  08/09/2013        HIP ARTHROPLASTY Left     HIP BIPOLAR REPLACEMENT Left     OTHER SURGICAL HISTORY      ARTIFICAL JOINTS/LIMBS     OTHER SURGICAL HISTORY      JOINT SURGERY    OTHER SURGICAL HISTORY      I & D for osteomylitis right foot       Home Medications:     Current Outpatient Medications:     acetaminophen (TYLENOL) 650 MG 8 hr tablet, Take 2 tablets by mouth Daily., Disp: , Rfl:     albuterol sulfate  (90 Base) MCG/ACT inhaler, Inhale 2 puffs Every 4 (Four) Hours As Needed for Wheezing or Shortness of Air., Disp: 18 g, Rfl: 0    allopurinol (ZYLOPRIM) 300 MG tablet, TAKE 1 TABLET BY MOUTH DAILY, Disp: 30 tablet, Rfl: 5    aspirin 81 MG EC tablet, Aspir-81 81 mg oral tablet,delayed release (DR/EC) take 1 tablet (81 mg) by oral route once daily   Active, Disp: , Rfl:     Calcium Carb-Cholecalciferol (CALCIUM 600 + D PO), Take  by mouth., Disp: , Rfl:      candesartan (ATACAND) 16 MG tablet, Take 1 tablet by mouth Daily., Disp: , Rfl:     Cholecalciferol 25 MCG (1000 UT) tablet, Take 1 tablet by mouth Daily., Disp: , Rfl:     coenzyme Q10 100 MG capsule, Take 1 capsule by mouth Daily., Disp: , Rfl:     colestipol (COLESTID) 5 g granules, Take 1 g by mouth., Disp: , Rfl:     ezetimibe (ZETIA) 10 MG tablet, TAKE 1 TABLET BY MOUTH DAILY, Disp: 90 tablet, Rfl: 3    Loratadine 10 MG capsule, As Needed., Disp: , Rfl:     meloxicam (MOBIC) 15 MG tablet, TAKE 1 TABLET BY MOUTH DAILY, Disp: 30 tablet, Rfl: 2    metoprolol succinate XL (TOPROL-XL) 50 MG 24 hr tablet, Take 1.5 tablets by mouth Every Night. (Patient taking differently: Take 1 tablet by mouth Every Night.), Disp: 135 tablet, Rfl: 3    pantoprazole (PROTONIX) 40 MG EC tablet, Take 1 tablet by mouth Daily., Disp: 90 tablet, Rfl: 3    predniSONE (DELTASONE) 10 MG tablet, Take 1 tablet by mouth Daily., Disp: , Rfl:     pyridostigmine (MESTINON) 60 MG tablet, Take 1 tablet by mouth 3 (Three) Times a Day., Disp: , Rfl:     rosuvastatin (CRESTOR) 10 MG tablet, Take 1.5 tablets by mouth Daily., Disp: 135 tablet, Rfl: 3    Sodium Fluoride 5000 Plus 1.1 % cream, BRUSH TEETH WITH PEA SIZED AMOUNT TWICE DAILY, Disp: , Rfl:   No current facility-administered medications for this visit.    Facility-Administered Medications Ordered in Other Visits:     sodium chloride 0.9 % infusion 250 mL, 250 mL, Intravenous, Once, Stanford Howe MD    Allergies:  No Known Allergies    Past Social History:  Social History     Socioeconomic History    Marital status:    Tobacco Use    Smoking status: Never    Smokeless tobacco: Never   Vaping Use    Vaping status: Never Used   Substance and Sexual Activity    Alcohol use: Yes     Alcohol/week: 3.0 standard drinks of alcohol     Types: 3 Cans of beer per week     Comment: occasionally    Drug use: Never    Sexual activity: Yes     Partners: Female     Birth control/protection:  "None       Past Family History:  Family History   Problem Relation Age of Onset    Stroke Mother     Arthritis Mother     Thyroid disease Mother     Prostate cancer Father 69    Heart disease Father     Colon cancer Paternal Aunt     Colon cancer Paternal Uncle     Colon cancer Paternal Grandmother 60         Objective:        Vital Signs:   Visit Vitals  /50 (BP Location: Left arm, Patient Position: Sitting)   Pulse 61   Ht 170.2 cm (67.01\")   Wt 106 kg (234 lb)   SpO2 96%   BMI 36.64 kg/m²     Wt Readings from Last 3 Encounters:   12/04/24 106 kg (234 lb)   12/03/24 108 kg (238 lb 12.1 oz)   10/03/24 105 kg (231 lb 6.4 oz)          Physical Exam:   GEN:  No acute distress, alert, cooperative, well developed   EYES:   Sclerae clear. No icterus. PERRL. Normal EOM  ENT:   External ears/nose normal, no oral lesions, no thrush, mucous membranes moist, Septum midline. Mallampati IV airway. Normal uvula    NECK:  Supple, midline trachea, no JVD  LUNGS: Normal chest on inspection, CTAB, no wheezes. No rhonchi. No crackles. Respirations regular, even and unlabored.   CV:  Regular rhythm and rate. Normal S1/S2. No murmurs, gallops, or rubs noted.  ABD:  Soft, nontender and nondistended. Normal bowel sounds. No guarding  EXT:  Moves all extremities well. No cyanosis. No redness. 1+ edema.   Skin: Dry, intact, no bleeding    Diagnostic Data:  HST 10/15/2023:      Respiratory Disturbance Events:     This is a home sleep study done on 10/15/2023  Recorded weight on the night of study was 109.3 kg with a BMI of 37.7  Total recording time 289.9 minutes with a monitoring time of 280.5 minutes  Respiratory summary was positive for moderate JAMEE with AHI of 15.0, AHI with the highest in the left position at 28.0  Sleep-related hypoxemia noted with paul desaturation down to 82% with 8.1 minutes spent in the hypoxemic range below 90%  Percentage of snoring 37.2%  Heart rate was within normal range  Nonspecific clustering " noted  Impression:    Moderate JAMEE  Mild sleep-related hypoxemia  Hypersomnia by history  Plan:   Treatment is recommended, initiate auto CPAP 5-15  Follow-up in the sleep center as ordered    Compliance download from 10/27/2024 - 11/25/2024 showed 77% adherence with average nightly use of 5 hours and 47 minutes on the nights used, 4 hours and 26 minutes per night overall.  The patient is on auto CPAP 5-15 with a median/95th percentile pressure of 6.6/10.5 cm H2O  Leak is minimal with a median/95th percentile air leak of 1.1/23.5 L/min  Sleep apnea is very well-controlled with residual AHI of 0.4      Assessment and Plan:       ICD-10-CM ICD-9-CM   1. JAMEE (obstructive sleep apnea)  G47.33 327.23   2. Sleep related hypoxia  G47.34 327.24   3. Poor sleep hygiene  Z72.821 307.49   4. Hypersomnia  G47.10 780.54   5. TIA (transient ischemic attack)  G45.9 435.9   6. Polycythemia  D75.1 238.4   7. Paroxysmal atrial fibrillation  I48.0 427.31   8. Myasthenia gravis  G70.00 358.00         Recommendations:     Patient is compliant with the CPAP as evident from the compliance download, however his compliance is sub optimal, he blames that on being on the prednisone for his recently diagnosed MG  He was re educated about the importance of having optimal control of the JAMEE and the need to increase CPAP usage   He does have dry mouth and was advised to use the chin strap and if no better to use FFM  Patient is getting both clinical and subjective benefit from the use of the CPAP machine  The CPAP machine is effective in controlling the underlying sleep apnea  CPAP is strongly recommended to be continued  Patient to continue work on the weight loss  No pressure adjustment recommended  He is to continue with the therapeutic phlebotomy per his hematologist recommendations   Continue with a yearly follow-up or sooner as needed     Orders Placed This Encounter   Procedures    PAP Therapy     No orders of the defined types were placed  in this encounter.    Return in about 1 year (around 12/4/2025).    Donna Saucedo MD   [facility]   12/04/24  09:40 EST    Dictated utilizing Dragon dictation

## 2024-12-13 ENCOUNTER — LAB (OUTPATIENT)
Dept: LAB | Facility: HOSPITAL | Age: 64
End: 2024-12-13
Payer: COMMERCIAL

## 2024-12-13 DIAGNOSIS — R73.01 IFG (IMPAIRED FASTING GLUCOSE): ICD-10-CM

## 2024-12-13 DIAGNOSIS — I10 HYPERTENSION, ESSENTIAL: ICD-10-CM

## 2024-12-13 DIAGNOSIS — K76.0 STEATOSIS OF LIVER: ICD-10-CM

## 2024-12-13 DIAGNOSIS — R74.8 ELEVATED LIVER ENZYMES: ICD-10-CM

## 2024-12-13 DIAGNOSIS — G47.33 OSA (OBSTRUCTIVE SLEEP APNEA): ICD-10-CM

## 2024-12-13 DIAGNOSIS — E78.2 MIXED HYPERLIPIDEMIA: ICD-10-CM

## 2024-12-13 DIAGNOSIS — N18.31 STAGE 3A CHRONIC KIDNEY DISEASE: ICD-10-CM

## 2024-12-13 DIAGNOSIS — E66.9 OBESITY (BMI 30-39.9): ICD-10-CM

## 2024-12-13 DIAGNOSIS — D47.2 MONOCLONAL GAMMOPATHY OF UNDETERMINED SIGNIFICANCE: ICD-10-CM

## 2024-12-13 DIAGNOSIS — I48.0 PAROXYSMAL ATRIAL FIBRILLATION: ICD-10-CM

## 2024-12-13 DIAGNOSIS — Z12.5 SCREENING PSA (PROSTATE SPECIFIC ANTIGEN): ICD-10-CM

## 2024-12-13 DIAGNOSIS — G47.10 HYPERSOMNIA: ICD-10-CM

## 2024-12-13 DIAGNOSIS — G45.9 TIA (TRANSIENT ISCHEMIC ATTACK): ICD-10-CM

## 2024-12-13 DIAGNOSIS — K21.9 GASTROESOPHAGEAL REFLUX DISEASE WITHOUT ESOPHAGITIS: ICD-10-CM

## 2024-12-13 DIAGNOSIS — D75.1 POLYCYTHEMIA: ICD-10-CM

## 2024-12-13 LAB
25(OH)D3 SERPL-MCNC: 40.7 NG/ML (ref 30–100)
ALBUMIN SERPL-MCNC: 3.8 G/DL (ref 3.5–5.2)
ALBUMIN/GLOB SERPL: 1.2 G/DL
ALP SERPL-CCNC: 65 U/L (ref 39–117)
ALT SERPL W P-5'-P-CCNC: 27 U/L (ref 1–41)
ANION GAP SERPL CALCULATED.3IONS-SCNC: 6.1 MMOL/L (ref 5–15)
AST SERPL-CCNC: 24 U/L (ref 1–40)
BASOPHILS # BLD AUTO: 0.07 10*3/MM3 (ref 0–0.2)
BASOPHILS NFR BLD AUTO: 0.8 % (ref 0–1.5)
BILIRUB SERPL-MCNC: 0.4 MG/DL (ref 0–1.2)
BUN SERPL-MCNC: 21 MG/DL (ref 8–23)
BUN/CREAT SERPL: 13.9 (ref 7–25)
CALCIUM SPEC-SCNC: 9.7 MG/DL (ref 8.6–10.5)
CHLORIDE SERPL-SCNC: 104 MMOL/L (ref 98–107)
CHOLEST SERPL-MCNC: 109 MG/DL (ref 0–200)
CO2 SERPL-SCNC: 25.9 MMOL/L (ref 22–29)
CREAT SERPL-MCNC: 1.51 MG/DL (ref 0.76–1.27)
CRP SERPL-MCNC: <0.3 MG/DL (ref 0–0.5)
DEPRECATED RDW RBC AUTO: 42.5 FL (ref 37–54)
EGFRCR SERPLBLD CKD-EPI 2021: 51.3 ML/MIN/1.73
EOSINOPHIL # BLD AUTO: 0.52 10*3/MM3 (ref 0–0.4)
EOSINOPHIL NFR BLD AUTO: 5.6 % (ref 0.3–6.2)
ERYTHROCYTE [DISTWIDTH] IN BLOOD BY AUTOMATED COUNT: 11.8 % (ref 12.3–15.4)
ERYTHROCYTE [SEDIMENTATION RATE] IN BLOOD: 5 MM/HR (ref 0–20)
FOLATE SERPL-MCNC: 3.28 NG/ML (ref 4.78–24.2)
GLOBULIN UR ELPH-MCNC: 3.2 GM/DL
GLUCOSE SERPL-MCNC: 92 MG/DL (ref 65–99)
HBA1C MFR BLD: 5.7 % (ref 4.8–5.6)
HCT VFR BLD AUTO: 49 % (ref 37.5–51)
HCV AB SER QL: NORMAL
HDLC SERPL-MCNC: 31 MG/DL (ref 40–60)
HGB BLD-MCNC: 15.7 G/DL (ref 13–17.7)
IMM GRANULOCYTES # BLD AUTO: 0.04 10*3/MM3 (ref 0–0.05)
IMM GRANULOCYTES NFR BLD AUTO: 0.4 % (ref 0–0.5)
LDLC SERPL CALC-MCNC: 55 MG/DL (ref 0–100)
LDLC/HDLC SERPL: 1.67 {RATIO}
LYMPHOCYTES # BLD AUTO: 2.38 10*3/MM3 (ref 0.7–3.1)
LYMPHOCYTES NFR BLD AUTO: 25.5 % (ref 19.6–45.3)
MCH RBC QN AUTO: 31.7 PG (ref 26.6–33)
MCHC RBC AUTO-ENTMCNC: 32 G/DL (ref 31.5–35.7)
MCV RBC AUTO: 98.8 FL (ref 79–97)
MONOCYTES # BLD AUTO: 0.82 10*3/MM3 (ref 0.1–0.9)
MONOCYTES NFR BLD AUTO: 8.8 % (ref 5–12)
NEUTROPHILS NFR BLD AUTO: 5.5 10*3/MM3 (ref 1.7–7)
NEUTROPHILS NFR BLD AUTO: 58.9 % (ref 42.7–76)
NRBC BLD AUTO-RTO: 0 /100 WBC (ref 0–0.2)
PLATELET # BLD AUTO: 200 10*3/MM3 (ref 140–450)
PMV BLD AUTO: 11.4 FL (ref 6–12)
POTASSIUM SERPL-SCNC: 4.4 MMOL/L (ref 3.5–5.2)
PROT SERPL-MCNC: 7 G/DL (ref 6–8.5)
PSA SERPL-MCNC: 1.45 NG/ML (ref 0–4)
RBC # BLD AUTO: 4.96 10*6/MM3 (ref 4.14–5.8)
SODIUM SERPL-SCNC: 136 MMOL/L (ref 136–145)
T4 FREE SERPL-MCNC: 1.32 NG/DL (ref 0.92–1.68)
TRIGL SERPL-MCNC: 131 MG/DL (ref 0–150)
TSH SERPL DL<=0.05 MIU/L-ACNC: 1.2 UIU/ML (ref 0.27–4.2)
VIT B12 BLD-MCNC: 350 PG/ML (ref 211–946)
VLDLC SERPL-MCNC: 23 MG/DL (ref 5–40)
WBC NRBC COR # BLD AUTO: 9.33 10*3/MM3 (ref 3.4–10.8)

## 2024-12-13 PROCEDURE — 82306 VITAMIN D 25 HYDROXY: CPT

## 2024-12-13 PROCEDURE — 85652 RBC SED RATE AUTOMATED: CPT

## 2024-12-13 PROCEDURE — 85025 COMPLETE CBC W/AUTO DIFF WBC: CPT

## 2024-12-13 PROCEDURE — 36415 COLL VENOUS BLD VENIPUNCTURE: CPT

## 2024-12-13 PROCEDURE — 82607 VITAMIN B-12: CPT

## 2024-12-13 PROCEDURE — 83036 HEMOGLOBIN GLYCOSYLATED A1C: CPT

## 2024-12-13 PROCEDURE — 80061 LIPID PANEL: CPT

## 2024-12-13 PROCEDURE — 82746 ASSAY OF FOLIC ACID SERUM: CPT

## 2024-12-13 PROCEDURE — G0103 PSA SCREENING: HCPCS

## 2024-12-13 PROCEDURE — 80053 COMPREHEN METABOLIC PANEL: CPT

## 2024-12-13 PROCEDURE — 84443 ASSAY THYROID STIM HORMONE: CPT

## 2024-12-13 PROCEDURE — 86140 C-REACTIVE PROTEIN: CPT

## 2024-12-13 PROCEDURE — 84439 ASSAY OF FREE THYROXINE: CPT

## 2024-12-13 PROCEDURE — 86803 HEPATITIS C AB TEST: CPT

## 2024-12-19 ENCOUNTER — OFFICE VISIT (OUTPATIENT)
Dept: INTERNAL MEDICINE | Age: 64
End: 2024-12-19
Payer: COMMERCIAL

## 2024-12-19 VITALS
WEIGHT: 236 LBS | HEIGHT: 67 IN | OXYGEN SATURATION: 94 % | DIASTOLIC BLOOD PRESSURE: 81 MMHG | TEMPERATURE: 98 F | SYSTOLIC BLOOD PRESSURE: 120 MMHG | BODY MASS INDEX: 37.04 KG/M2 | HEART RATE: 61 BPM

## 2024-12-19 DIAGNOSIS — K21.9 GASTROESOPHAGEAL REFLUX DISEASE WITHOUT ESOPHAGITIS: ICD-10-CM

## 2024-12-19 DIAGNOSIS — I10 HYPERTENSION, ESSENTIAL: ICD-10-CM

## 2024-12-19 DIAGNOSIS — E78.2 MIXED HYPERLIPIDEMIA: ICD-10-CM

## 2024-12-19 DIAGNOSIS — G70.00 MYASTHENIA GRAVIS: Primary | ICD-10-CM

## 2024-12-19 DIAGNOSIS — K76.0 STEATOSIS OF LIVER: ICD-10-CM

## 2024-12-19 DIAGNOSIS — D75.1 POLYCYTHEMIA: ICD-10-CM

## 2024-12-19 DIAGNOSIS — K63.5 POLYP OF COLON, UNSPECIFIED PART OF COLON, UNSPECIFIED TYPE: ICD-10-CM

## 2024-12-19 DIAGNOSIS — D47.2 MONOCLONAL GAMMOPATHY OF UNDETERMINED SIGNIFICANCE: ICD-10-CM

## 2024-12-19 DIAGNOSIS — G47.33 OSA (OBSTRUCTIVE SLEEP APNEA): ICD-10-CM

## 2024-12-19 DIAGNOSIS — Z12.5 SCREENING PSA (PROSTATE SPECIFIC ANTIGEN): ICD-10-CM

## 2024-12-19 DIAGNOSIS — R73.01 IFG (IMPAIRED FASTING GLUCOSE): ICD-10-CM

## 2024-12-19 DIAGNOSIS — N18.31 STAGE 3A CHRONIC KIDNEY DISEASE: ICD-10-CM

## 2024-12-19 DIAGNOSIS — I48.0 PAROXYSMAL ATRIAL FIBRILLATION: ICD-10-CM

## 2024-12-19 DIAGNOSIS — E66.9 OBESITY (BMI 30-39.9): ICD-10-CM

## 2024-12-19 PROCEDURE — 99214 OFFICE O/P EST MOD 30 MIN: CPT | Performed by: INTERNAL MEDICINE

## 2024-12-19 RX ORDER — PREDNISONE 5 MG/1
5 TABLET ORAL DAILY
COMMUNITY
Start: 2024-12-18 | End: 2025-06-17

## 2024-12-19 NOTE — PROGRESS NOTES
"CHIEF COMPLAINT/ HPI:  Hypertension (Routine follow up, Lab follow up. Pt is on Prednisone 5mg daily and would like to discuss that with provider. DDD, DISH, Spinal stenosis being treated at [facility]. )    F/u with MYASTHENIA GRAVIS --STARTED RX IN SEPT PER with [facility],          Objective   Vital Signs  Vitals:    12/19/24 1019   BP: 120/81   BP Location: Left arm   Patient Position: Sitting   Pulse: 61   Temp: 98 °F (36.7 °C)   SpO2: 94%   Weight: 107 kg (236 lb)   Height: 170.2 cm (67.01\")      Body mass index is 36.95 kg/m².  Review of Systems   Constitutional:  Negative for chills, diaphoresis, fatigue and fever.   HENT:  Negative for congestion, sore throat and swollen glands.    Respiratory:  Negative for cough.    Cardiovascular:  Negative for chest pain.   Gastrointestinal:  Negative for abdominal pain, nausea and vomiting.   Genitourinary:  Negative for dysuria.   Musculoskeletal:  Negative for myalgias and neck pain.   Skin:  Negative for rash.   Neurological:  Negative for weakness and numbness.      Physical Exam  Constitutional:       General: He is not in acute distress.     Appearance: Normal appearance. He is obese.   HENT:      Head: Normocephalic.      Mouth/Throat:      Mouth: Mucous membranes are moist.   Eyes:      Conjunctiva/sclera: Conjunctivae normal.      Pupils: Pupils are equal, round, and reactive to light.   Cardiovascular:      Rate and Rhythm: Normal rate and regular rhythm.      Pulses: Normal pulses.      Heart sounds: Normal heart sounds.   Pulmonary:      Effort: Pulmonary effort is normal.      Breath sounds: Normal breath sounds.   Abdominal:      General: Bowel sounds are normal.      Palpations: Abdomen is soft.   Musculoskeletal:         General: No swelling. Normal range of motion.      Cervical back: Neck supple.   Skin:     General: Skin is warm and dry.      Coloration: Skin is not jaundiced.   Neurological:      General: No focal deficit present.      Mental " "Status: He is alert and oriented to person, place, and time. Mental status is at baseline.   Psychiatric:         Mood and Affect: Mood normal.         Behavior: Behavior normal.         Thought Content: Thought content normal.         Judgment: Judgment normal.        Result Review :   No results found for: \"PROBNP\", \"BNP\"  CMP          8/2/2024    10:21 10/3/2024    14:50 12/13/2024    07:24   CMP   Glucose 102   92    BUN 15   21    Creatinine 1.17  1.40  1.51    EGFR 70.0  56.1  51.3    Sodium 138   136    Potassium 4.8   4.4    Chloride 103   104    Calcium 9.2   9.7    Total Protein 7.0   7.0    Albumin 3.9   3.8    Globulin 3.1   3.2    Total Bilirubin 0.4   0.4    Alkaline Phosphatase 87   65    AST (SGOT) 25   24    ALT (SGPT) 23   27    Albumin/Globulin Ratio 1.3   1.2    BUN/Creatinine Ratio 12.8   13.9    Anion Gap 7.4   6.1      CBC w/diff          8/22/2024    10:05 12/3/2024    09:46 12/13/2024    07:24   CBC w/Diff   WBC   9.33    RBC   4.96    Hemoglobin 14.9  15.8  15.7    Hematocrit 44.2  47.2  49.0    MCV   98.8    MCH   31.7    MCHC   32.0    RDW   11.8    Platelets   200    Neutrophil Rel %   58.9    Immature Granulocyte Rel %   0.4    Lymphocyte Rel %   25.5    Monocyte Rel %   8.8    Eosinophil Rel %   5.6    Basophil Rel %   0.8       Lipid Panel          6/25/2024    04:34 8/2/2024    10:21 12/13/2024    07:24   Lipid Panel   Total Cholesterol 120  112  109    Triglycerides 106  92  131    HDL Cholesterol 31  31  31    VLDL Cholesterol 20  18  23    LDL Cholesterol  69  63  55    LDL/HDL Ratio 2.19  2.02  1.67       Lab Results   Component Value Date    TSH 1.200 12/13/2024    TSH 1.399 09/18/2024    TSH 1.750 12/07/2021      Lab Results   Component Value Date    FREET4 1.32 12/13/2024    FREET4 1.09 12/07/2021    FREET4 0.9 05/04/2019      A1C Last 3 Results          6/10/2024    09:01 6/25/2024    04:34 12/13/2024    07:24   HGBA1C Last 3 Results   Hemoglobin A1C 5.60  5.70  5.70     "   PSA          12/13/2024    07:24   PSA   PSA 1.450                     Visit Diagnoses:    ICD-10-CM ICD-9-CM   1. Myasthenia gravis  G70.00 358.00   2. Monoclonal gammopathy of undetermined significance  D47.2 273.1   3. Stage 3a chronic kidney disease  N18.31 585.3   4. Polyp of colon, unspecified part of colon, unspecified type  K63.5 211.3   5. Polycythemia  D75.1 238.4   6. Steatosis of liver  K76.0 571.8   7. Obesity (BMI 30-39.9)  E66.9 278.00   8. Mixed hyperlipidemia  E78.2 272.2   9. Hypertension, essential  I10 401.9   10. IFG (impaired fasting glucose)  R73.01 790.21   11. Paroxysmal atrial fibrillation  I48.0 427.31   12. Screening PSA (prostate specific antigen)  Z12.5 V76.44   13. JAMEE (obstructive sleep apnea)  G47.33 327.23   14. Gastroesophageal reflux disease without esophagitis  K21.9 530.81       Assessment and Plan   Diagnoses and all orders for this visit:    1. Myasthenia gravis (Primary)  -     Comprehensive Metabolic Panel; Future  -     CBC & Differential; Future  -     Hemoglobin A1c; Future  -     Uric Acid; Future  -     Lipid Panel; Future  -     MicroAlbumin, Urine, Random - Urine, Clean Catch; Future  -     Vitamin B12 anemia; Future  -     Folate anemia; Future  -     Magnesium; Future    2. Monoclonal gammopathy of undetermined significance  -     Comprehensive Metabolic Panel; Future  -     CBC & Differential; Future  -     Hemoglobin A1c; Future  -     Uric Acid; Future  -     Lipid Panel; Future  -     MicroAlbumin, Urine, Random - Urine, Clean Catch; Future  -     Vitamin B12 anemia; Future  -     Folate anemia; Future  -     Magnesium; Future    3. Stage 3a chronic kidney disease  -     Comprehensive Metabolic Panel; Future  -     CBC & Differential; Future  -     Hemoglobin A1c; Future  -     Uric Acid; Future  -     Lipid Panel; Future  -     MicroAlbumin, Urine, Random - Urine, Clean Catch; Future  -     Vitamin B12 anemia; Future  -     Folate anemia; Future  -      Magnesium; Future    4. Polyp of colon, unspecified part of colon, unspecified type  -     Comprehensive Metabolic Panel; Future  -     CBC & Differential; Future  -     Hemoglobin A1c; Future  -     Uric Acid; Future  -     Lipid Panel; Future  -     MicroAlbumin, Urine, Random - Urine, Clean Catch; Future  -     Vitamin B12 anemia; Future  -     Folate anemia; Future  -     Magnesium; Future    5. Polycythemia  -     Comprehensive Metabolic Panel; Future  -     CBC & Differential; Future  -     Hemoglobin A1c; Future  -     Uric Acid; Future  -     Lipid Panel; Future  -     MicroAlbumin, Urine, Random - Urine, Clean Catch; Future  -     Vitamin B12 anemia; Future  -     Folate anemia; Future  -     Magnesium; Future    6. Steatosis of liver  -     Comprehensive Metabolic Panel; Future  -     CBC & Differential; Future  -     Hemoglobin A1c; Future  -     Uric Acid; Future  -     Lipid Panel; Future  -     MicroAlbumin, Urine, Random - Urine, Clean Catch; Future  -     Vitamin B12 anemia; Future  -     Folate anemia; Future  -     Magnesium; Future    7. Obesity (BMI 30-39.9)  -     Comprehensive Metabolic Panel; Future  -     CBC & Differential; Future  -     Hemoglobin A1c; Future  -     Uric Acid; Future  -     Lipid Panel; Future  -     MicroAlbumin, Urine, Random - Urine, Clean Catch; Future  -     Vitamin B12 anemia; Future  -     Folate anemia; Future  -     Magnesium; Future    8. Mixed hyperlipidemia  -     Comprehensive Metabolic Panel; Future  -     CBC & Differential; Future  -     Hemoglobin A1c; Future  -     Uric Acid; Future  -     Lipid Panel; Future  -     MicroAlbumin, Urine, Random - Urine, Clean Catch; Future  -     Vitamin B12 anemia; Future  -     Folate anemia; Future  -     Magnesium; Future    9. Hypertension, essential  -     Comprehensive Metabolic Panel; Future  -     CBC & Differential; Future  -     Hemoglobin A1c; Future  -     Uric Acid; Future  -     Lipid Panel; Future  -      MicroAlbumin, Urine, Random - Urine, Clean Catch; Future  -     Vitamin B12 anemia; Future  -     Folate anemia; Future  -     Magnesium; Future    10. IFG (impaired fasting glucose)  -     Comprehensive Metabolic Panel; Future  -     CBC & Differential; Future  -     Hemoglobin A1c; Future  -     Uric Acid; Future  -     Lipid Panel; Future  -     MicroAlbumin, Urine, Random - Urine, Clean Catch; Future  -     Vitamin B12 anemia; Future  -     Folate anemia; Future  -     Magnesium; Future    11. Paroxysmal atrial fibrillation  -     Comprehensive Metabolic Panel; Future  -     CBC & Differential; Future  -     Hemoglobin A1c; Future  -     Uric Acid; Future  -     Lipid Panel; Future  -     MicroAlbumin, Urine, Random - Urine, Clean Catch; Future  -     Vitamin B12 anemia; Future  -     Folate anemia; Future  -     Magnesium; Future    12. Screening PSA (prostate specific antigen)  -     Comprehensive Metabolic Panel; Future  -     CBC & Differential; Future  -     Hemoglobin A1c; Future  -     Uric Acid; Future  -     Lipid Panel; Future  -     MicroAlbumin, Urine, Random - Urine, Clean Catch; Future  -     Vitamin B12 anemia; Future  -     Folate anemia; Future  -     Magnesium; Future    13. JAMEE (obstructive sleep apnea)  -     Comprehensive Metabolic Panel; Future  -     CBC & Differential; Future  -     Hemoglobin A1c; Future  -     Uric Acid; Future  -     Lipid Panel; Future  -     MicroAlbumin, Urine, Random - Urine, Clean Catch; Future  -     Vitamin B12 anemia; Future  -     Folate anemia; Future  -     Magnesium; Future    14. Gastroesophageal reflux disease without esophagitis  -     Comprehensive Metabolic Panel; Future  -     CBC & Differential; Future  -     Hemoglobin A1c; Future  -     Uric Acid; Future  -     Lipid Panel; Future  -     MicroAlbumin, Urine, Random - Urine, Clean Catch; Future  -     Vitamin B12 anemia; Future  -     Folate anemia; Future  -     Magnesium; Future    Other orders  -      Semaglutide-Weight Management 0.25 MG/0.5ML solution auto-injector; Inject 0.5 mL under the skin into the appropriate area as directed 1 (One) Time Per Week.  Dispense: 2 mL; Refill: 1        Class 2 Severe Obesity (BMI >=35 and <=39.9). Obesity-related health conditions include the following: hypertension. Obesity is unchanged. BMI is is above average; BMI management plan is completed. We discussed portion control and increasing exercise.---------------- discussion about GLP-1's weight loss helping with fatty liver blood sugar issues mobilization activity and general wellbeing, December 19th, 2024 discussed, consider use of zepbound,, Wegovy, zepbound was not covered per pharmacy, Wegovy was sent in,    Myasthenia gravis, started and continues Mestinon 30 mg 3 times a day, prednisone 5 mg daily, ---------------diagnosed [place] with blood testing clinical, CT chest October 3, 2024, shows no thymus, no significant CT abnormality, hepatic steatosis changes of previous cholecystectomy,     TIA symptoms, slurred speech thickened tongue, June /July 2024, workup at the hospital--- CT angiogram of the head showed atherosclerotic disease of the carotid arteries but no measurable stenosis no vertebral dissection or carotid dissection no flow-limiting stenosis, no aneurysm, ----MRI of the brain shows no acute brain abnormality moderate paranasal sinus disease,, chest x-ray no active disease,    CT perfusion scan showed abnormal study demonstrating 37 mL area of ischemia at risk tissue in the posterior fossa and temporal lobes right greater than left, =======labs showed hemoglobin A1c of 5.7 cholesterol 120 LDL of 69, echocardiogram, June 2024 shows normal ejection fraction mild septal asymmetric hypertrophy grade 1 diastolic dysfunction moderate left atrial enlargement bubble study was negative no significant valve disease--- would continue Eliquis, and low-dose aspirin, ----------- currently receiving 30-day event  monitor to rule out underlying paroxysmal atrial fibrillation, and has appointment at [facility] September 12, 2024,-------R/O MYASTHENIA GRAVIS --- discussed getting panel patient is going to wait for his [facility] appointment in September and get the blood work done at that time,    Polycythemia -----phlebotomy = frequency to every 3 months for hematocrit greater than or equal to 40 and holding for less than 39--- patient is undergoing some blood testing JAK2 erythropoietin levels etc. even considering bone marrow biopsy, with hematologist, August 2024, has done phlebotomy December 3, 2024     R elan, --April 2024, --chadwick augustin, courtney/ derrek --    Stress test April 7, 2023 shows normal exercise capacity low risk study no evidence of ischemia, Dr. Lr    CKD stage IIIa,, creatinine better at 1.1 June 2024    Osteomyelitis, right ankle, has finished up course of antibiotics treatment options including continue doxycycline for 6 months versus following sed rates retreating if sed rates increase, discussed with patient December 9, 2021 --------------------patient had surgery for I&D and bone biopsy sept 1, 2021--- showing MRSA and gout into the right ankle and foot on September 1, 2021 by Dr. Turner at [facility] --- through [facility], had a PICC line placed, was put on IV antibiotics by infectious disease doctor  on vancomycin every 12 hours at home, for total of 6 weeks----released from orthopedics care as of January 2022,, sed rate CRPs both levels are undetectable or essentially 0 as of March 2022,     Impaired fasting glucose, hemoglobin A1c 5.7 = December 2024    LAP CHOLEY, AND POST OP BILE LEAK/ CBD OBSTRUCTION, READMITTED,AND HAD ERCP WITH DR PICKARD--- DEVELOPED AFIB WITH RVR, SEPTIC , ELEVATED WBC, APRIL 2019, CARDIOVERTED, WAS RX WITH ABX, HAD MARIAN DRAIN PLACED BY RADIOLOGY-- AND THEN ANOTHER MARIAN DRAIN PLACED, --THORACENTISIS ALSO DONE TIMES 2, --HAD STENT REMOVED JUNE  2019, HAD STRESS TEST WITH [facility],, May 2019, no evidence of ischemia and normal myocardial SPECT perfusion study,-------CARDIAC EVENT MONITOR 8/2019, --NO AFIB - Stopped eliquis October 2019    Gout, cont allopurinol 300 mg qd, uric acid level 4.9 December 2022,    B12 folic acid deficiencies December 2024 recommend over-the-counter replacement for both    VASC CALCIFICATIONS SEEN ON CAROTIDS ---ON PLAIN XRAY PER [facility] FOR BONE SURVERY ----- CAROTID U/S, MARCH 2021,, no stenosis present carotid bifurcations, mild to moderate mixed density nonstenotic plaque bilateral, vertebral flow antegrade,, continue statin therapy if possible,    JAMEE ---because of polycythemia, sleep study at the request of the [facility] physician, June 2023,, now on CPAP, June 2024    Psa= 1.4 December 13, 2024    HTN--cont ATACAND 32 MG QD, METOPROLOL  75 QD     ELEVATED CHOL.--continues Qunol, Crestor, 15 mg daily, Zetia 10 mg daily    ELEVATED LFTS, KEENAN--going back to 2016--currently normal June 8, 2023,------- ultrasounds liver reviewed, October 2017 and March 2017, shows improvement in liver size,---Has had ultrasounds and fibrosis scan October 2019, slightly elevated score of 0.39 stage F1 to F2, moderate steatosis, has been followed by GI service, -- ultrasound March 5, 2021 shows normal echogenicity of the liver texture, no liver lesions, no biliary ductal dilation, otherwise unremarkable--ultrasound of the liver September 1, 2022 shows liver size at 16.1 cm otherwise unremarkable scan,    MONOCLONAL PARAPROTEINURIA, DX IN 2015-16, NOW GOING TO [place] FOR F/U Total proteins have been stable over the last several years through [facility], no treatment at this time    OA, OF L HIP AND NICOLAS 12/13, KAREN,,     OVERWGT-continue weight loss efforts, start walking more, exercise if he can,, discussed December 2023--- patient's clinic consider using GLP-1's but he is going to continue diet and exercise, discussed August 2024    CSCOPE  9/13 AND EGD , AND AUG 2017, NEERAJ colonoscopy August 19, 2022 tubular adenoma,           Follow Up   Return in about 6 months (around 6/19/2025).  Patient was given instructions and counseling regarding his condition or for health maintenance advice. Please see specific information pulled into the AVS if appropriate.           Answers submitted by the patient for this visit:  Primary Reason for Visit (Submitted on 12/17/2024)  What is the primary reason for your visit?: Problem Not Listed  Problem not listed (Submitted on 12/17/2024)  Chief Complaint: Other medical problem  Reason for appointment: Follow up  anorexia: No  joint pain: No  change in stool: No  headaches: No  joint swelling: No  vertigo: No  visual change: No

## 2024-12-27 RX ORDER — ALLOPURINOL 300 MG/1
300 TABLET ORAL DAILY
Qty: 30 TABLET | Refills: 5 | Status: SHIPPED | OUTPATIENT
Start: 2024-12-27

## 2025-01-16 ENCOUNTER — OFFICE VISIT (OUTPATIENT)
Dept: CARDIOLOGY | Facility: CLINIC | Age: 65
End: 2025-01-16
Payer: COMMERCIAL

## 2025-01-16 VITALS
SYSTOLIC BLOOD PRESSURE: 117 MMHG | HEIGHT: 67 IN | BODY MASS INDEX: 37.1 KG/M2 | DIASTOLIC BLOOD PRESSURE: 66 MMHG | HEART RATE: 58 BPM | WEIGHT: 236.4 LBS

## 2025-01-16 DIAGNOSIS — I48.0 PAROXYSMAL ATRIAL FIBRILLATION: Primary | ICD-10-CM

## 2025-01-16 DIAGNOSIS — E78.2 MIXED HYPERLIPIDEMIA: ICD-10-CM

## 2025-01-16 DIAGNOSIS — I10 HYPERTENSION, ESSENTIAL: ICD-10-CM

## 2025-01-16 PROCEDURE — 99214 OFFICE O/P EST MOD 30 MIN: CPT | Performed by: INTERNAL MEDICINE

## 2025-01-16 RX ORDER — METOPROLOL SUCCINATE 25 MG/1
25 TABLET, EXTENDED RELEASE ORAL DAILY
Qty: 90 TABLET | Refills: 3 | Status: SHIPPED | OUTPATIENT
Start: 2025-01-16

## 2025-01-16 RX ORDER — FOLIC ACID 0.4 MG
400 TABLET ORAL DAILY
COMMUNITY

## 2025-01-16 NOTE — PROGRESS NOTES
Chief Complaint  Follow-up, Atrial Fibrillation, Hypertension, and Hyperlipidemia    Subjective    Patient doing well symptomatically no recurrent tachycardic episodes denies any chest pain  Past Medical History:   Diagnosis Date    A-fib     Acid reflux     Arthritis     Carotid atherosclerosis, bilateral 12/09/2021    Colon polyps     Essential hypertension     Family history of colon cancer     Fatty liver     GERD (gastroesophageal reflux disease)     Hip pain     LEFT     Insomnia     MGUS (monoclonal gammopathy of unknown significance)     Myasthenia gravis     Osteomyelitis of ankle     patient reports right ankle    Pes cavus of left foot 01/12/2019    Primary osteoarthritis of left foot 01/12/2019    Sciatic nerve pain          Current Outpatient Medications:     acetaminophen (TYLENOL) 650 MG 8 hr tablet, Take 2 tablets by mouth Daily. (Patient taking differently: Take 2 tablets by mouth Daily. Pt takes in total 1000mg daily), Disp: , Rfl:     allopurinol (ZYLOPRIM) 300 MG tablet, TAKE 1 TABLET BY MOUTH DAILY, Disp: 30 tablet, Rfl: 5    aspirin 81 MG EC tablet, Aspir-81 81 mg oral tablet,delayed release (DR/EC) take 1 tablet (81 mg) by oral route once daily   Active, Disp: , Rfl:     B Complex Vitamins (B-COMPLEX/B-12 SL), Take 1 tablet by mouth Daily., Disp: , Rfl:     Calcium Carb-Cholecalciferol (CALCIUM 600 + D PO), Take  by mouth., Disp: , Rfl:     candesartan (ATACAND) 16 MG tablet, Take 1 tablet by mouth Daily., Disp: , Rfl:     Cholecalciferol 25 MCG (1000 UT) tablet, Take 1 tablet by mouth Daily., Disp: , Rfl:     coenzyme Q10 100 MG capsule, Take 1 capsule by mouth Daily., Disp: , Rfl:     colestipol (COLESTID) 5 g granules, Take 1 g by mouth., Disp: , Rfl:     ezetimibe (ZETIA) 10 MG tablet, TAKE 1 TABLET BY MOUTH DAILY, Disp: 90 tablet, Rfl: 3    folic acid (FOLVITE) 400 MCG tablet, Take 1 tablet by mouth Daily., Disp: , Rfl:     Loratadine 10 MG capsule, As Needed., Disp: , Rfl:      "Menaquinone-7 40 MCG tablet, Take 40 mcg by mouth Daily., Disp: , Rfl:     pantoprazole (PROTONIX) 40 MG EC tablet, Take 1 tablet by mouth Daily., Disp: 90 tablet, Rfl: 3    predniSONE (DELTASONE) 5 MG tablet, Take 1 tablet by mouth Daily., Disp: , Rfl:     pyridostigmine (MESTINON) 60 MG tablet, Take 1 tablet by mouth 3 (Three) Times a Day., Disp: , Rfl:     rosuvastatin (CRESTOR) 10 MG tablet, Take 1.5 tablets by mouth Daily., Disp: 135 tablet, Rfl: 3    Sodium Fluoride 5000 Plus 1.1 % cream, BRUSH TEETH WITH PEA SIZED AMOUNT TWICE DAILY, Disp: , Rfl:     Turmeric, Curcuma Longa, powder, Take 500 mg by mouth Daily., Disp: , Rfl:     metoprolol succinate XL (TOPROL-XL) 25 MG 24 hr tablet, Take 1 tablet by mouth Daily., Disp: 90 tablet, Rfl: 3    Semaglutide-Weight Management 0.25 MG/0.5ML solution auto-injector, Inject 0.5 mL under the skin into the appropriate area as directed 1 (One) Time Per Week. (Patient not taking: Reported on 1/16/2025), Disp: 2 mL, Rfl: 1    Medications Discontinued During This Encounter   Medication Reason    albuterol sulfate  (90 Base) MCG/ACT inhaler *Therapy completed    metoprolol succinate XL (TOPROL-XL) 50 MG 24 hr tablet      No Known Allergies     Social History     Tobacco Use    Smoking status: Never    Smokeless tobacco: Never   Vaping Use    Vaping status: Never Used   Substance Use Topics    Alcohol use: Yes     Alcohol/week: 3.0 standard drinks of alcohol     Types: 3 Cans of beer per week     Comment: occasionally    Drug use: Never       Family History   Problem Relation Age of Onset    Stroke Mother     Arthritis Mother     Thyroid disease Mother     Prostate cancer Father 69    Heart disease Father     Colon cancer Paternal Aunt     Colon cancer Paternal Uncle     Colon cancer Paternal Grandmother 60        Objective     /66   Pulse 58   Ht 170.2 cm (67.01\")   Wt 107 kg (236 lb 6.4 oz)   BMI 37.02 kg/m²       Physical Exam    General Appearance:   no " "acute distress  Alert and oriented x3  HENT:   lips not cyanotic  Atraumatic  Neck:  No jvd   supple  Respiratory:  no respiratory distress  normal breath sounds  no rales  Cardiovascular:  Regular rate and rhythm  no S3, no S4   no murmur  no rub  Extremities  No cyanosis  lower extremity edema: none    Skin:   warm, dry  No rashes      Result Review :     No results found for: \"PROBNP\"  CMP          8/2/2024    10:21 10/3/2024    14:50 12/13/2024    07:24   CMP   Glucose 102   92    BUN 15   21    Creatinine 1.17  1.40  1.51    EGFR 70.0  56.1  51.3    Sodium 138   136    Potassium 4.8   4.4    Chloride 103   104    Calcium 9.2   9.7    Total Protein 7.0   7.0    Albumin 3.9   3.8    Globulin 3.1   3.2    Total Bilirubin 0.4   0.4    Alkaline Phosphatase 87   65    AST (SGOT) 25   24    ALT (SGPT) 23   27    Albumin/Globulin Ratio 1.3   1.2    BUN/Creatinine Ratio 12.8   13.9    Anion Gap 7.4   6.1      CBC w/diff          8/22/2024    10:05 12/3/2024    09:46 12/13/2024    07:24   CBC w/Diff   WBC   9.33    RBC   4.96    Hemoglobin 14.9  15.8  15.7    Hematocrit 44.2  47.2  49.0    MCV   98.8    MCH   31.7    MCHC   32.0    RDW   11.8    Platelets   200    Neutrophil Rel %   58.9    Immature Granulocyte Rel %   0.4    Lymphocyte Rel %   25.5    Monocyte Rel %   8.8    Eosinophil Rel %   5.6    Basophil Rel %   0.8       Lab Results   Component Value Date    TSH 1.200 12/13/2024      Lab Results   Component Value Date    FREET4 1.32 12/13/2024      No results found for: \"DDIMERQUANT\"  Magnesium   Date Value Ref Range Status   06/09/2022 2.0 1.6 - 2.4 mg/dL Final      No results found for: \"DIGOXIN\"   Lab Results   Component Value Date    TROPONINT 22 (H) 06/24/2024           Lipid Panel          6/25/2024    04:34 8/2/2024    10:21 12/13/2024    07:24   Lipid Panel   Total Cholesterol 120  112  109    Triglycerides 106  92  131    HDL Cholesterol 31  31  31    VLDL Cholesterol 20  18  23    LDL Cholesterol  69  63 " " 55    LDL/HDL Ratio 2.19  2.02  1.67      No results found for: \"POCTROP\"  Results for orders placed during the hospital encounter of 04/07/23    Stress Test With Myocardial Perfusion One Day    Interpretation Summary    Left ventricular ejection fraction is normal (Calculated EF = 52%).    Myocardial perfusion imaging indicates a normal myocardial perfusion study with no evidence of ischemia.    Impressions are consistent with a low risk study.    Findings consistent with a normal ECG stress test.    1.  Patient with good exercise capacity and able to achieve greater than 85% of target heart rate  2.  No clinical symptoms of angina  3.  No EKG evidence of ischemia  4.  Low risk stress test    Results for orders placed during the hospital encounter of 06/24/24    Adult Transthoracic Echo Complete W/ Cont if Necessary Per Protocol (With Agitated Saline)    Interpretation Summary    Left ventricular ejection fraction appears to be 61 - 65%.    Left ventricular wall thickness is consistent with septal asymmetric hypertrophy.    Left ventricular diastolic function is consistent with (grade I) impaired relaxation and age.    The left atrial cavity is moderately dilated.    The right atrial cavity is borderline dilated.    Bubble study was negative for right to left shunt.    No significant valvular disease.                 Diagnoses and all orders for this visit:    1. Paroxysmal atrial fibrillation (Primary)  Assessment & Plan:  patient with no recurrent A-fib episodes since cardioversion 2018 chronic Aspir 81 milligrams once a day  Counseled patient for avoidance of atrial fibrillation to  Avoid alcohol consumption  Aerobic activity 30 minutes or greater per day 5 times per week  Weight loss      Orders:  -     metoprolol succinate XL (TOPROL-XL) 25 MG 24 hr tablet; Take 1 tablet by mouth Daily.  Dispense: 90 tablet; Refill: 3    2. Hypertension, essential  Assessment & Plan:  Patient's blood pressure well-controlled " on Toprol and given his recent diagnosis of myasthenia gravis felt reasonable to try to titrate down to 25 mg once a day and see how he does as well as blood pressure remaining controlled and no tachycardic symptoms       3. Mixed hyperlipidemia            Follow Up     Return in about 6 months (around 7/16/2025) for Follow with Carmen Crawford, EKG with F/U.          Patient was given instructions and counseling regarding his condition or for health maintenance advice. Please see specific information pulled into the AVS if appropriate.

## 2025-01-16 NOTE — ASSESSMENT & PLAN NOTE
patient with no recurrent A-fib episodes since cardioversion 2018 chronic Aspir 81 milligrams once a day  Counseled patient for avoidance of atrial fibrillation to  Avoid alcohol consumption  Aerobic activity 30 minutes or greater per day 5 times per week  Weight loss

## 2025-01-16 NOTE — ASSESSMENT & PLAN NOTE
Patient's blood pressure well-controlled on Toprol and given his recent diagnosis of myasthenia gravis felt reasonable to try to titrate down to 25 mg once a day and see how he does as well as blood pressure remaining controlled and no tachycardic symptoms

## 2025-02-26 ENCOUNTER — TELEPHONE (OUTPATIENT)
Dept: INTERNAL MEDICINE | Age: 65
End: 2025-02-26

## 2025-02-26 NOTE — TELEPHONE ENCOUNTER
Caller: Paolo Goldstein    Relationship to patient: Self    Best call back number:     [phone]       Date of positive COVID19 test: NONE    Date of possible COVID19 exposure: UNKNOWN    COVID19 symptoms: COUGH    Additional information or concerns: PATIENT'S WIFE TESTED POSITIVE TODAY, 2.26.25    What is the patients preferred pharmacy: [id number] [address] [facility] [address] - [phone]  - [phone] FX       PATIENT STATES THAT HE WOULD LIKE TO KNOW IF HE NEEDS A PCR TEST OR NOT. HE STATES THAT HE HAD SYMPTOMS LAST WEEK BUT NOW THE ONLY SYMPTOM LEFT IS A COUGH.

## 2025-04-07 ENCOUNTER — HOSPITAL ENCOUNTER (OUTPATIENT)
Dept: INFUSION THERAPY | Facility: HOSPITAL | Age: 65
Discharge: HOME | End: 2025-04-07
Admitting: INTERNAL MEDICINE
Payer: COMMERCIAL

## 2025-04-07 VITALS
SYSTOLIC BLOOD PRESSURE: 111 MMHG | RESPIRATION RATE: 20 BRPM | HEIGHT: 67 IN | TEMPERATURE: 97.6 F | BODY MASS INDEX: 36.09 KG/M2 | DIASTOLIC BLOOD PRESSURE: 56 MMHG | HEART RATE: 64 BPM | OXYGEN SATURATION: 98 % | WEIGHT: 229.94 LBS

## 2025-04-07 DIAGNOSIS — D75.1 POLYCYTHEMIA: Primary | ICD-10-CM

## 2025-04-07 LAB
HCT VFR BLD AUTO: 49 % (ref 37.5–51)
HGB BLD-MCNC: 16.4 G/DL (ref 13–17.7)

## 2025-04-07 PROCEDURE — 99195 PHLEBOTOMY: CPT

## 2025-04-07 PROCEDURE — 85014 HEMATOCRIT: CPT | Performed by: INTERNAL MEDICINE

## 2025-04-07 PROCEDURE — 36415 COLL VENOUS BLD VENIPUNCTURE: CPT

## 2025-04-07 PROCEDURE — 85018 HEMOGLOBIN: CPT | Performed by: INTERNAL MEDICINE

## 2025-04-07 RX ORDER — SODIUM CHLORIDE 9 MG/ML
250 INJECTION, SOLUTION INTRAVENOUS ONCE
OUTPATIENT
Start: 2025-06-01

## 2025-04-07 RX ORDER — SODIUM CHLORIDE 9 MG/ML
250 INJECTION, SOLUTION INTRAVENOUS ONCE
Status: DISCONTINUED | OUTPATIENT
Start: 2025-04-07 | End: 2025-04-09 | Stop reason: HOSPADM

## 2025-04-16 RX ORDER — ROSUVASTATIN CALCIUM 10 MG/1
15 TABLET, COATED ORAL DAILY
Qty: 135 TABLET | Refills: 3 | Status: SHIPPED | OUTPATIENT
Start: 2025-04-16

## 2025-04-16 NOTE — TELEPHONE ENCOUNTER
Rx Refill Note  Requested Prescriptions     Pending Prescriptions Disp Refills    rosuvastatin (CRESTOR) 10 MG tablet [Pharmacy Med Name: ROSUVASTATIN 10MG TABLETS] 135 tablet 3     Sig: TAKE 1 AND 1/2 TABLETS BY MOUTH DAILY        LAST OFFICE VISIT:  1/16/2025     NEXT OFFICE VISIT:  07/16/2025     Does the medication requests match the last office note:    [x] Yes   [] No    Does this refill request meet protocol details for MA to approve:     [x] Yes   [] No   [] No Protocols Provided

## 2025-06-18 ENCOUNTER — LAB (OUTPATIENT)
Dept: LAB | Facility: HOSPITAL | Age: 65
End: 2025-06-18
Payer: COMMERCIAL

## 2025-06-18 DIAGNOSIS — K63.5 POLYP OF COLON, UNSPECIFIED PART OF COLON, UNSPECIFIED TYPE: ICD-10-CM

## 2025-06-18 DIAGNOSIS — N18.31 STAGE 3A CHRONIC KIDNEY DISEASE: ICD-10-CM

## 2025-06-18 DIAGNOSIS — I10 HYPERTENSION, ESSENTIAL: ICD-10-CM

## 2025-06-18 DIAGNOSIS — R73.01 IFG (IMPAIRED FASTING GLUCOSE): ICD-10-CM

## 2025-06-18 DIAGNOSIS — G70.00 MYASTHENIA GRAVIS: ICD-10-CM

## 2025-06-18 DIAGNOSIS — E66.9 OBESITY (BMI 30-39.9): ICD-10-CM

## 2025-06-18 DIAGNOSIS — D47.2 MONOCLONAL GAMMOPATHY OF UNDETERMINED SIGNIFICANCE: ICD-10-CM

## 2025-06-18 DIAGNOSIS — D75.1 POLYCYTHEMIA: ICD-10-CM

## 2025-06-18 DIAGNOSIS — G47.33 OSA (OBSTRUCTIVE SLEEP APNEA): ICD-10-CM

## 2025-06-18 DIAGNOSIS — I48.0 PAROXYSMAL ATRIAL FIBRILLATION: ICD-10-CM

## 2025-06-18 DIAGNOSIS — K76.0 STEATOSIS OF LIVER: ICD-10-CM

## 2025-06-18 DIAGNOSIS — Z12.5 SCREENING PSA (PROSTATE SPECIFIC ANTIGEN): ICD-10-CM

## 2025-06-18 DIAGNOSIS — E78.2 MIXED HYPERLIPIDEMIA: ICD-10-CM

## 2025-06-18 DIAGNOSIS — K21.9 GASTROESOPHAGEAL REFLUX DISEASE WITHOUT ESOPHAGITIS: ICD-10-CM

## 2025-06-18 LAB
ALBUMIN SERPL-MCNC: 4.1 G/DL (ref 3.5–5.2)
ALBUMIN UR-MCNC: <1.2 MG/DL
ALBUMIN/GLOB SERPL: 1.2 G/DL
ALP SERPL-CCNC: 71 U/L (ref 39–117)
ALT SERPL W P-5'-P-CCNC: 26 U/L (ref 1–41)
ANION GAP SERPL CALCULATED.3IONS-SCNC: 7 MMOL/L (ref 5–15)
AST SERPL-CCNC: 28 U/L (ref 1–40)
BASOPHILS # BLD AUTO: 0.06 10*3/MM3 (ref 0–0.2)
BASOPHILS NFR BLD AUTO: 0.8 % (ref 0–1.5)
BILIRUB SERPL-MCNC: 0.4 MG/DL (ref 0–1.2)
BUN SERPL-MCNC: 24 MG/DL (ref 8–23)
BUN/CREAT SERPL: 18.3 (ref 7–25)
CALCIUM SPEC-SCNC: 9.9 MG/DL (ref 8.6–10.5)
CHLORIDE SERPL-SCNC: 105 MMOL/L (ref 98–107)
CHOLEST SERPL-MCNC: 124 MG/DL (ref 0–200)
CO2 SERPL-SCNC: 26 MMOL/L (ref 22–29)
CREAT SERPL-MCNC: 1.31 MG/DL (ref 0.76–1.27)
DEPRECATED RDW RBC AUTO: 44.1 FL (ref 37–54)
EGFRCR SERPLBLD CKD-EPI 2021: 60.8 ML/MIN/1.73
EOSINOPHIL # BLD AUTO: 0.5 10*3/MM3 (ref 0–0.4)
EOSINOPHIL NFR BLD AUTO: 6.6 % (ref 0.3–6.2)
ERYTHROCYTE [DISTWIDTH] IN BLOOD BY AUTOMATED COUNT: 12.2 % (ref 12.3–15.4)
FOLATE SERPL-MCNC: >20 NG/ML (ref 4.78–24.2)
GLOBULIN UR ELPH-MCNC: 3.3 GM/DL
GLUCOSE SERPL-MCNC: 95 MG/DL (ref 65–99)
HBA1C MFR BLD: 5.6 % (ref 4.8–5.6)
HCT VFR BLD AUTO: 51.2 % (ref 37.5–51)
HDLC SERPL-MCNC: 41 MG/DL (ref 40–60)
HGB BLD-MCNC: 17.2 G/DL (ref 13–17.7)
IMM GRANULOCYTES # BLD AUTO: 0.04 10*3/MM3 (ref 0–0.05)
IMM GRANULOCYTES NFR BLD AUTO: 0.5 % (ref 0–0.5)
LDLC SERPL CALC-MCNC: 67 MG/DL (ref 0–100)
LDLC/HDLC SERPL: 1.64 {RATIO}
LYMPHOCYTES # BLD AUTO: 2.25 10*3/MM3 (ref 0.7–3.1)
LYMPHOCYTES NFR BLD AUTO: 29.6 % (ref 19.6–45.3)
MAGNESIUM SERPL-MCNC: 2.1 MG/DL (ref 1.6–2.4)
MCH RBC QN AUTO: 33 PG (ref 26.6–33)
MCHC RBC AUTO-ENTMCNC: 33.6 G/DL (ref 31.5–35.7)
MCV RBC AUTO: 98.3 FL (ref 79–97)
MONOCYTES # BLD AUTO: 0.59 10*3/MM3 (ref 0.1–0.9)
MONOCYTES NFR BLD AUTO: 7.8 % (ref 5–12)
NEUTROPHILS NFR BLD AUTO: 4.16 10*3/MM3 (ref 1.7–7)
NEUTROPHILS NFR BLD AUTO: 54.7 % (ref 42.7–76)
NRBC BLD AUTO-RTO: 0 /100 WBC (ref 0–0.2)
PLATELET # BLD AUTO: 178 10*3/MM3 (ref 140–450)
PMV BLD AUTO: 11.2 FL (ref 6–12)
POTASSIUM SERPL-SCNC: 4.4 MMOL/L (ref 3.5–5.2)
PROT SERPL-MCNC: 7.4 G/DL (ref 6–8.5)
RBC # BLD AUTO: 5.21 10*6/MM3 (ref 4.14–5.8)
SODIUM SERPL-SCNC: 138 MMOL/L (ref 136–145)
TRIGL SERPL-MCNC: 78 MG/DL (ref 0–150)
URATE SERPL-MCNC: 4.5 MG/DL (ref 3.4–7)
VIT B12 BLD-MCNC: 1063 PG/ML (ref 211–946)
VLDLC SERPL-MCNC: 16 MG/DL (ref 5–40)
WBC NRBC COR # BLD AUTO: 7.6 10*3/MM3 (ref 3.4–10.8)

## 2025-06-18 PROCEDURE — 83735 ASSAY OF MAGNESIUM: CPT

## 2025-06-18 PROCEDURE — 80061 LIPID PANEL: CPT

## 2025-06-18 PROCEDURE — 84550 ASSAY OF BLOOD/URIC ACID: CPT

## 2025-06-18 PROCEDURE — 82746 ASSAY OF FOLIC ACID SERUM: CPT

## 2025-06-18 PROCEDURE — 80053 COMPREHEN METABOLIC PANEL: CPT

## 2025-06-18 PROCEDURE — 82043 UR ALBUMIN QUANTITATIVE: CPT

## 2025-06-18 PROCEDURE — 36415 COLL VENOUS BLD VENIPUNCTURE: CPT

## 2025-06-18 PROCEDURE — 82607 VITAMIN B-12: CPT

## 2025-06-18 PROCEDURE — 83036 HEMOGLOBIN GLYCOSYLATED A1C: CPT

## 2025-06-18 PROCEDURE — 85025 COMPLETE CBC W/AUTO DIFF WBC: CPT

## 2025-06-24 ENCOUNTER — OFFICE VISIT (OUTPATIENT)
Dept: INTERNAL MEDICINE | Age: 65
End: 2025-06-24
Payer: COMMERCIAL

## 2025-06-24 VITALS
DIASTOLIC BLOOD PRESSURE: 74 MMHG | OXYGEN SATURATION: 95 % | BODY MASS INDEX: 34.14 KG/M2 | TEMPERATURE: 98.4 F | WEIGHT: 218 LBS | SYSTOLIC BLOOD PRESSURE: 118 MMHG | HEART RATE: 64 BPM

## 2025-06-24 DIAGNOSIS — K76.0 STEATOSIS OF LIVER: ICD-10-CM

## 2025-06-24 DIAGNOSIS — I48.0 PAROXYSMAL ATRIAL FIBRILLATION: ICD-10-CM

## 2025-06-24 DIAGNOSIS — Z00.00 PHYSICAL EXAM, ANNUAL: Primary | ICD-10-CM

## 2025-06-24 DIAGNOSIS — I10 HYPERTENSION, ESSENTIAL: ICD-10-CM

## 2025-06-24 DIAGNOSIS — K21.9 GASTROESOPHAGEAL REFLUX DISEASE WITHOUT ESOPHAGITIS: ICD-10-CM

## 2025-06-24 DIAGNOSIS — K63.5 POLYP OF COLON, UNSPECIFIED PART OF COLON, UNSPECIFIED TYPE: ICD-10-CM

## 2025-06-24 DIAGNOSIS — M16.10 PRIMARY OSTEOARTHRITIS OF HIP, UNSPECIFIED LATERALITY: ICD-10-CM

## 2025-06-24 DIAGNOSIS — N18.31 STAGE 3A CHRONIC KIDNEY DISEASE: ICD-10-CM

## 2025-06-24 DIAGNOSIS — D47.2 MONOCLONAL GAMMOPATHY OF UNDETERMINED SIGNIFICANCE: ICD-10-CM

## 2025-06-24 DIAGNOSIS — E66.9 OBESITY (BMI 30-39.9): ICD-10-CM

## 2025-06-24 DIAGNOSIS — R73.01 IFG (IMPAIRED FASTING GLUCOSE): ICD-10-CM

## 2025-06-24 DIAGNOSIS — G47.33 OSA (OBSTRUCTIVE SLEEP APNEA): ICD-10-CM

## 2025-06-24 DIAGNOSIS — R74.8 ELEVATED LIVER ENZYMES: ICD-10-CM

## 2025-06-24 DIAGNOSIS — Z72.821 POOR SLEEP HYGIENE: ICD-10-CM

## 2025-06-24 DIAGNOSIS — D75.1 POLYCYTHEMIA: ICD-10-CM

## 2025-06-24 DIAGNOSIS — E78.2 MIXED HYPERLIPIDEMIA: ICD-10-CM

## 2025-06-24 DIAGNOSIS — G70.00 MYASTHENIA GRAVIS: ICD-10-CM

## 2025-06-24 DIAGNOSIS — Z12.5 SCREENING PSA (PROSTATE SPECIFIC ANTIGEN): ICD-10-CM

## 2025-06-24 PROCEDURE — 99396 PREV VISIT EST AGE 40-64: CPT | Performed by: INTERNAL MEDICINE

## 2025-06-24 RX ORDER — PREDNISONE 1 MG/1
4 TABLET ORAL DAILY
COMMUNITY
Start: 2025-05-06

## 2025-06-24 NOTE — PROGRESS NOTES
Chief Complaint   Patient presents with    Annual Exam     Physical, Lab follow up. Pt states being off of Toprol for 2 months.         Objective   Vital Signs  Vitals:    06/24/25 0958   BP: 118/74   BP Location: Left arm   Patient Position: Sitting   Pulse: 64   Temp: 98.4 °F (36.9 °C)   SpO2: 95%   Weight: 98.9 kg (218 lb)      Body mass index is 34.14 kg/m².  Review of Systems   Constitutional: Negative.    HENT: Negative.     Eyes: Negative.    Respiratory: Negative.     Cardiovascular: Negative.    Gastrointestinal: Negative.    Endocrine: Negative.    Genitourinary: Negative.    Musculoskeletal: Negative.    Allergic/Immunologic: Negative.    Neurological: Negative.    Hematological: Negative.    Psychiatric/Behavioral: Negative.        Physical Exam  Constitutional:       General: He is not in acute distress.     Appearance: Normal appearance. He is obese.   HENT:      Head: Normocephalic.      Mouth/Throat:      Mouth: Mucous membranes are moist.   Eyes:      Conjunctiva/sclera: Conjunctivae normal.      Pupils: Pupils are equal, round, and reactive to light.   Cardiovascular:      Rate and Rhythm: Normal rate and regular rhythm.      Pulses: Normal pulses.      Heart sounds: Normal heart sounds.   Pulmonary:      Effort: Pulmonary effort is normal.      Breath sounds: Normal breath sounds.   Abdominal:      General: Bowel sounds are normal.      Palpations: Abdomen is soft.   Musculoskeletal:         General: No swelling. Normal range of motion.      Cervical back: Neck supple.   Skin:     General: Skin is warm and dry.      Coloration: Skin is not jaundiced.   Neurological:      General: No focal deficit present.      Mental Status: He is alert and oriented to person, place, and time. Mental status is at baseline.   Psychiatric:         Mood and Affect: Mood normal.         Behavior: Behavior normal.         Thought Content: Thought content normal.         Judgment: Judgment normal.        Result Review :  "  No results found for: \"PROBNP\", \"BNP\"  CMP          12/13/2024    07:24 3/19/2025    12:10 6/18/2025    07:35   CMP   Glucose 92   95    BUN 21   24.0    Creatinine 1.51   1.31    EGFR 51.3   60.8    Sodium 136   138    Potassium 4.4   4.4    Chloride 104   105    Calcium 9.7   9.9    Total Protein 7.0  7.1     7.4    Albumin 3.8   4.1    Globulin 3.2   3.3    Total Bilirubin 0.4   0.4    Alkaline Phosphatase 65   71    AST (SGOT) 24   28    ALT (SGPT) 27   26    Albumin/Globulin Ratio 1.2   1.2    BUN/Creatinine Ratio 13.9   18.3    Anion Gap 6.1   7.0       Details          This result is from an external source.             CBC w/diff          12/13/2024    07:24 4/7/2025    08:52 6/18/2025    07:35   CBC w/Diff   WBC 9.33   7.60    RBC 4.96   5.21    Hemoglobin 15.7  16.4  17.2    Hematocrit 49.0  49.0  51.2    MCV 98.8   98.3    MCH 31.7   33.0    MCHC 32.0   33.6    RDW 11.8   12.2    Platelets 200   178    Neutrophil Rel % 58.9   54.7    Immature Granulocyte Rel % 0.4   0.5    Lymphocyte Rel % 25.5   29.6    Monocyte Rel % 8.8   7.8    Eosinophil Rel % 5.6   6.6    Basophil Rel % 0.8   0.8       Lipid Panel          8/2/2024    10:21 12/13/2024    07:24 6/18/2025    07:35   Lipid Panel   Total Cholesterol 112  109  124    Triglycerides 92  131  78    HDL Cholesterol 31  31  41    VLDL Cholesterol 18  23  16    LDL Cholesterol  63  55  67    LDL/HDL Ratio 2.02  1.67  1.64       Lab Results   Component Value Date    TSH 1.200 12/13/2024    TSH 1.399 09/18/2024    TSH 1.750 12/07/2021      Lab Results   Component Value Date    FREET4 1.32 12/13/2024    FREET4 1.09 12/07/2021    FREET4 0.9 05/04/2019      A1C Last 3 Results          12/13/2024    07:24 6/18/2025    07:35   HGBA1C Last 3 Results   Hemoglobin A1C 5.70  5.60       PSA          12/13/2024    07:24   PSA   PSA 1.450                     Visit Diagnoses:    ICD-10-CM ICD-9-CM   1. Physical exam, annual  Z00.00 V70.0   2. Poor sleep hygiene  Z72.821 " 307.49   3. JAMEE (obstructive sleep apnea)  G47.33 327.23   4. Primary osteoarthritis of hip, unspecified laterality  M16.10 715.15   5. Screening PSA (prostate specific antigen)  Z12.5 V76.44   6. Gastroesophageal reflux disease without esophagitis  K21.9 530.81   7. IFG (impaired fasting glucose)  R73.01 790.21   8. Hypertension, essential  I10 401.9   9. Mixed hyperlipidemia  E78.2 272.2   10. Paroxysmal atrial fibrillation  I48.0 427.31   11. Polyp of colon, unspecified part of colon, unspecified type  K63.5 211.3   12. Polycythemia  D75.1 238.4   13. Steatosis of liver  K76.0 571.8   14. Elevated liver enzymes  R74.8 790.5   15. Obesity (BMI 30-39.9)  E66.9 278.00   16. Stage 3a chronic kidney disease  N18.31 585.3   17. Monoclonal gammopathy of undetermined significance  D47.2 273.1   18. Myasthenia gravis  G70.00 358.00       Assessment and Plan   Diagnoses and all orders for this visit:    1. Physical exam, annual (Primary)  -     CBC & Differential; Future  -     Comprehensive Metabolic Panel; Future  -     Lipid Panel; Future  -     Hemoglobin A1c; Future  -     Magnesium; Future  -     PSA Screen; Future  -     Uric Acid; Future    2. Poor sleep hygiene  -     CBC & Differential; Future  -     Comprehensive Metabolic Panel; Future  -     Lipid Panel; Future  -     Hemoglobin A1c; Future  -     Magnesium; Future  -     PSA Screen; Future  -     Uric Acid; Future    3. JAMEE (obstructive sleep apnea)  -     CBC & Differential; Future  -     Comprehensive Metabolic Panel; Future  -     Lipid Panel; Future  -     Hemoglobin A1c; Future  -     Magnesium; Future  -     PSA Screen; Future  -     Uric Acid; Future    4. Primary osteoarthritis of hip, unspecified laterality  -     CBC & Differential; Future  -     Comprehensive Metabolic Panel; Future  -     Lipid Panel; Future  -     Hemoglobin A1c; Future  -     Magnesium; Future  -     PSA Screen; Future  -     Uric Acid; Future    5. Screening PSA (prostate  specific antigen)  -     CBC & Differential; Future  -     Comprehensive Metabolic Panel; Future  -     Lipid Panel; Future  -     Hemoglobin A1c; Future  -     Magnesium; Future  -     PSA Screen; Future  -     Uric Acid; Future    6. Gastroesophageal reflux disease without esophagitis  -     CBC & Differential; Future  -     Comprehensive Metabolic Panel; Future  -     Lipid Panel; Future  -     Hemoglobin A1c; Future  -     Magnesium; Future  -     PSA Screen; Future  -     Uric Acid; Future    7. IFG (impaired fasting glucose)  -     CBC & Differential; Future  -     Comprehensive Metabolic Panel; Future  -     Lipid Panel; Future  -     Hemoglobin A1c; Future  -     Magnesium; Future  -     PSA Screen; Future  -     Uric Acid; Future    8. Hypertension, essential  -     CBC & Differential; Future  -     Comprehensive Metabolic Panel; Future  -     Lipid Panel; Future  -     Hemoglobin A1c; Future  -     Magnesium; Future  -     PSA Screen; Future  -     Uric Acid; Future    9. Mixed hyperlipidemia  -     CBC & Differential; Future  -     Comprehensive Metabolic Panel; Future  -     Lipid Panel; Future  -     Hemoglobin A1c; Future  -     Magnesium; Future  -     PSA Screen; Future  -     Uric Acid; Future    10. Paroxysmal atrial fibrillation  -     CBC & Differential; Future  -     Comprehensive Metabolic Panel; Future  -     Lipid Panel; Future  -     Hemoglobin A1c; Future  -     Magnesium; Future  -     PSA Screen; Future  -     Uric Acid; Future    11. Polyp of colon, unspecified part of colon, unspecified type  -     CBC & Differential; Future  -     Comprehensive Metabolic Panel; Future  -     Lipid Panel; Future  -     Hemoglobin A1c; Future  -     Magnesium; Future  -     PSA Screen; Future  -     Uric Acid; Future    12. Polycythemia  -     CBC & Differential; Future  -     Comprehensive Metabolic Panel; Future  -     Lipid Panel; Future  -     Hemoglobin A1c; Future  -     Magnesium; Future  -      PSA Screen; Future  -     Uric Acid; Future    13. Steatosis of liver  -     CBC & Differential; Future  -     Comprehensive Metabolic Panel; Future  -     Lipid Panel; Future  -     Hemoglobin A1c; Future  -     Magnesium; Future  -     PSA Screen; Future  -     Uric Acid; Future    14. Elevated liver enzymes  -     CBC & Differential; Future  -     Comprehensive Metabolic Panel; Future  -     Lipid Panel; Future  -     Hemoglobin A1c; Future  -     Magnesium; Future  -     PSA Screen; Future  -     Uric Acid; Future    15. Obesity (BMI 30-39.9)  -     CBC & Differential; Future  -     Comprehensive Metabolic Panel; Future  -     Lipid Panel; Future  -     Hemoglobin A1c; Future  -     Magnesium; Future  -     PSA Screen; Future  -     Uric Acid; Future    16. Stage 3a chronic kidney disease  -     CBC & Differential; Future  -     Comprehensive Metabolic Panel; Future  -     Lipid Panel; Future  -     Hemoglobin A1c; Future  -     Magnesium; Future  -     PSA Screen; Future  -     Uric Acid; Future    17. Monoclonal gammopathy of undetermined significance  -     CBC & Differential; Future  -     Comprehensive Metabolic Panel; Future  -     Lipid Panel; Future  -     Hemoglobin A1c; Future  -     Magnesium; Future  -     PSA Screen; Future  -     Uric Acid; Future    18. Myasthenia gravis  -     CBC & Differential; Future  -     Comprehensive Metabolic Panel; Future  -     Lipid Panel; Future  -     Hemoglobin A1c; Future  -     Magnesium; Future  -     PSA Screen; Future  -     Uric Acid; Future    Annual Exam, preventive measures discussed, patient wears a seatbelt he works and plays and plays golf every day,, no alcohol use currently no's tobacco use currently, discussed grant 24 2025    Class 2 Severe Obesity (BMI >=35 and <=39.9). Obesity-related health conditions include the following: hypertension. Obesity is unchanged. BMI is is above average; BMI management plan is completed. We discussed portion control and  increasing exercise.---------------- discussion about GLP-1's weight loss helping with fatty liver blood sugar issues mobilization activity and general wellbeing, December 19th, 2024 discussed, consider use of zepbound,, Wegovy, zepbound was not covered per pharmacy, Wegovy was sent in,    Myasthenia gravis, continues Mestinon 30 mg 3 times a day, prednisone 2.5 mg daily, ---------------diagnosed [facility] with blood testing clinical, CT chest October 3, 2024, shows no thymus, no significant CT abnormality, hepatic steatosis changes of previous cholecystectomy,     TIA symptoms, slurred speech thickened tongue, June /July 2024, workup at the hospital--- CT angiogram of the head showed atherosclerotic disease of the carotid arteries but no measurable stenosis no vertebral dissection or carotid dissection no flow-limiting stenosis, no aneurysm, ----MRI of the brain shows no acute brain abnormality moderate paranasal sinus disease,, chest x-ray no active disease,    CT perfusion scan showed abnormal study demonstrating 37 mL area of ischemia at risk tissue in the posterior fossa and temporal lobes right greater than left, =======labs showed hemoglobin A1c of 5.7 cholesterol 120 LDL of 69, echocardiogram, June 2024 shows normal ejection fraction mild septal asymmetric hypertrophy grade 1 diastolic dysfunction moderate left atrial enlargement bubble study was negative no significant valve disease--- would continue Eliquis, and low-dose aspirin, ----------- currently receiving 30-day event monitor to rule out underlying paroxysmal atrial fibrillation, and has appointment at [facility] September 12, 2024,-------R/O MYASTHENIA GRAVIS --- discussed getting panel patient is going to wait for his [facility] appointment in September and get the blood work done at that time,    Polycythemia -----phlebotomy = frequency to every 3 months for hematocrit greater than or equal to 40 and holding for less than 39--- patient  is undergoing some blood testing JAK2 erythropoietin levels etc. even considering bone marrow biopsy, with hematologist, August 2024, has done phlebotomy December 3, 2024     R elan, --April 2024, --dr casas , [place]wick, courtney/ derrek --    Stress test April 7, 2023 shows normal exercise capacity low risk study no evidence of ischemia, Dr. Lr    CKD stage IIIa,, improved June 2025    Osteomyelitis, right ankle, has finished up course of antibiotics treatment options including continue doxycycline for 6 months versus following sed rates retreating if sed rates increase, discussed with patient December 9, 2021 --------------------patient had surgery for I&D and bone biopsy sept 1, 2021--- showing MRSA and gout into the right ankle and foot on September 1, 2021 by Dr. Turner at [facility] --- through [facility], had a PICC line placed, was put on IV antibiotics by infectious disease doctor  on vancomycin every 12 hours at home, for total of 6 weeks----released from orthopedics care as of January 2022,, sed rate CRPs both levels are undetectable or essentially 0 as of March 2022,     Impaired fasting glucose, hemoglobin A1c 5.6, June 2025 with weight loss efforts,    LAP CHOLEY, AND POST OP BILE LEAK/ CBD OBSTRUCTION, READMITTED,AND HAD ERCP WITH DR PICKARD--- DEVELOPED AFIB WITH RVR, SEPTIC , ELEVATED WBC, APRIL 2019, CARDIOVERTED, WAS RX WITH ABX, HAD MARIAN DRAIN PLACED BY RADIOLOGY-- AND THEN ANOTHER MARIAN DRAIN PLACED, --THORACENTISIS ALSO DONE TIMES 2, --HAD STENT REMOVED JUNE 2019, HAD STRESS TEST WITH [facility],, May 2019, no evidence of ischemia and normal myocardial SPECT perfusion study,-------CARDIAC EVENT MONITOR 8/2019, --NO AFIB - Stopped eliquis October 2019    Gout, cont allopurinol 300 mg qd,     B12 folic acid deficiencies December 2024 recommend over-the-counter replacement for both    VASC CALCIFICATIONS SEEN ON CAROTIDS ---ON PLAIN XRAY PER [facility] FOR BONE SURVERY ----- CAROTID U/S, MARCH  2021,, no stenosis present carotid bifurcations, mild to moderate mixed density nonstenotic plaque bilateral, vertebral flow antegrade,, continue statin therapy if possible,    JAMEE ---because of polycythemia, sleep study at the request of the [facility] physician, June 2023,, cont  CPAP, June 2024    Psa= 1.4 December 13, 2024    HTN--cont ATACAND 16 MG QD, , off METOPROLOL  75 QD     ELEVATED CHOL.--continues Qunol, Crestor, 15 mg daily, Zetia 10 mg daily    ELEVATED LFTS, KEENAN--going back to 2016--currently normal June 8, 2023,------- ultrasounds liver reviewed, October 2017 and March 2017, shows improvement in liver size,---Has had ultrasounds and fibrosis scan October 2019, slightly elevated score of 0.39 stage F1 to F2, moderate steatosis, has been followed by GI service, -- ultrasound March 5, 2021 shows normal echogenicity of the liver texture, no liver lesions, no biliary ductal dilation, otherwise unremarkable--ultrasound of the liver September 1, 2022 shows liver size at 16.1 cm otherwise unremarkable scan,    MONOCLONAL PARAPROTEINURIA, DX IN 2015-16, NOW GOING TO [place] FOR F/U Total proteins have been stable over the last several years through [facility], no treatment at this time    OA, OF L HIP AND NICOLAS 12/13, KAREN,,     OVERWGT-continue weight loss efforts, start walking more, exercise if he can,, discussed December 2023--- patient's clinic consider using GLP-1's but he is going to continue diet and exercise, discussed August 2024    CSCOPE 9/13 AND EGD , AND AUG 2017, NEERAJ colonoscopy August 19, 2022 tubular adenoma,                  Follow Up   Return in about 6 months (around 12/24/2025).  Patient was given instructions and counseling regarding his condition or for health maintenance advice. Please see specific information pulled into the AVS if appropriate.

## 2025-07-03 RX ORDER — ALLOPURINOL 300 MG/1
300 TABLET ORAL DAILY
Qty: 30 TABLET | Refills: 5 | Status: SHIPPED | OUTPATIENT
Start: 2025-07-03

## 2025-07-03 RX ORDER — PANTOPRAZOLE SODIUM 40 MG/1
40 TABLET, DELAYED RELEASE ORAL DAILY
Qty: 90 TABLET | Refills: 3 | Status: SHIPPED | OUTPATIENT
Start: 2025-07-03

## 2025-07-07 ENCOUNTER — HOSPITAL ENCOUNTER (OUTPATIENT)
Dept: INFUSION THERAPY | Facility: HOSPITAL | Age: 65
Discharge: HOME | End: 2025-07-07
Admitting: INTERNAL MEDICINE
Payer: COMMERCIAL

## 2025-07-07 ENCOUNTER — TELEPHONE (OUTPATIENT)
Dept: INTERNAL MEDICINE | Age: 65
End: 2025-07-07
Payer: COMMERCIAL

## 2025-07-07 VITALS
RESPIRATION RATE: 20 BRPM | WEIGHT: 221.78 LBS | OXYGEN SATURATION: 97 % | HEART RATE: 61 BPM | SYSTOLIC BLOOD PRESSURE: 127 MMHG | DIASTOLIC BLOOD PRESSURE: 69 MMHG | BODY MASS INDEX: 34.81 KG/M2 | HEIGHT: 67 IN | TEMPERATURE: 97.6 F

## 2025-07-07 DIAGNOSIS — D75.1 POLYCYTHEMIA: Primary | ICD-10-CM

## 2025-07-07 LAB
HCT VFR BLD AUTO: 45.1 % (ref 37.5–51)
HGB BLD-MCNC: 15.4 G/DL (ref 13–17.7)

## 2025-07-07 PROCEDURE — 36415 COLL VENOUS BLD VENIPUNCTURE: CPT

## 2025-07-07 PROCEDURE — 85014 HEMATOCRIT: CPT | Performed by: INTERNAL MEDICINE

## 2025-07-07 PROCEDURE — 85018 HEMOGLOBIN: CPT | Performed by: INTERNAL MEDICINE

## 2025-07-07 PROCEDURE — 99195 PHLEBOTOMY: CPT

## 2025-07-07 RX ORDER — SODIUM CHLORIDE 9 MG/ML
250 INJECTION, SOLUTION INTRAVENOUS ONCE
Status: DISCONTINUED | OUTPATIENT
Start: 2025-07-07 | End: 2025-07-09 | Stop reason: HOSPADM

## 2025-07-07 NOTE — TELEPHONE ENCOUNTER
Order needing to be signed for therapy plan, I gave verbal for the Outpatient facility to go ahead with the labs as he is there to get them done. Just a FYI

## 2025-08-14 RX ORDER — EZETIMIBE 10 MG/1
10 TABLET ORAL DAILY
Qty: 90 TABLET | Refills: 3 | Status: SHIPPED | OUTPATIENT
Start: 2025-08-14

## 2025-08-18 ENCOUNTER — OFFICE VISIT (OUTPATIENT)
Dept: CARDIOLOGY | Facility: CLINIC | Age: 65
End: 2025-08-18
Payer: MEDICARE

## 2025-08-18 VITALS
WEIGHT: 216.8 LBS | SYSTOLIC BLOOD PRESSURE: 125 MMHG | DIASTOLIC BLOOD PRESSURE: 74 MMHG | HEIGHT: 67 IN | HEART RATE: 49 BPM | BODY MASS INDEX: 34.03 KG/M2

## 2025-08-18 DIAGNOSIS — E78.2 MIXED HYPERLIPIDEMIA: ICD-10-CM

## 2025-08-18 DIAGNOSIS — I48.0 PAROXYSMAL ATRIAL FIBRILLATION: Primary | ICD-10-CM

## 2025-08-18 DIAGNOSIS — I65.23 CAROTID ATHEROSCLEROSIS, BILATERAL: ICD-10-CM

## 2025-08-18 DIAGNOSIS — I10 HYPERTENSION, ESSENTIAL: ICD-10-CM

## 2025-08-18 PROBLEM — R05.1 ACUTE COUGH: Status: RESOLVED | Noted: 2024-09-30 | Resolved: 2025-08-18

## 2025-08-18 PROCEDURE — 3074F SYST BP LT 130 MM HG: CPT | Performed by: NURSE PRACTITIONER

## 2025-08-18 PROCEDURE — 93000 ELECTROCARDIOGRAM COMPLETE: CPT | Performed by: NURSE PRACTITIONER

## 2025-08-18 PROCEDURE — 3078F DIAST BP <80 MM HG: CPT | Performed by: NURSE PRACTITIONER

## 2025-08-18 PROCEDURE — 1159F MED LIST DOCD IN RCRD: CPT | Performed by: NURSE PRACTITIONER

## 2025-08-18 PROCEDURE — 99214 OFFICE O/P EST MOD 30 MIN: CPT | Performed by: NURSE PRACTITIONER

## 2025-08-18 PROCEDURE — 1160F RVW MEDS BY RX/DR IN RCRD: CPT | Performed by: NURSE PRACTITIONER

## 2025-08-18 RX ORDER — CANDESARTAN 8 MG/1
8 TABLET ORAL DAILY
Qty: 90 TABLET | Refills: 0 | Status: SHIPPED | OUTPATIENT
Start: 2025-08-18

## (undated) DEVICE — COLON KIT: Brand: MEDLINE INDUSTRIES, INC.

## (undated) DEVICE — Device: Brand: DEFENDO AIR/WATER/SUCTION AND BIOPSY VALVE

## (undated) DEVICE — SNAR E/S POLYP SNAREMASTER OVL/10MM 2.8X2300MM YEL

## (undated) DEVICE — SOL IRRG H2O PL/BG 1000ML STRL

## (undated) DEVICE — THE SINGLE USE ETRAP – POLYP TRAP IS USED FOR SUCTION RETRIEVAL OF ENDOSCOPICALLY REMOVED POLYPS.: Brand: ETRAP